# Patient Record
Sex: FEMALE | Race: WHITE | NOT HISPANIC OR LATINO | Employment: OTHER | URBAN - METROPOLITAN AREA
[De-identification: names, ages, dates, MRNs, and addresses within clinical notes are randomized per-mention and may not be internally consistent; named-entity substitution may affect disease eponyms.]

---

## 2018-12-04 LAB
LEFT EYE DM RETINOPATHY: NEGATIVE
RIGHT EYE DM RETINOPATHY: NEGATIVE

## 2018-12-28 DIAGNOSIS — M25.551 ACUTE RIGHT HIP PAIN: Primary | ICD-10-CM

## 2018-12-28 DIAGNOSIS — M25.561 ACUTE PAIN OF RIGHT KNEE: ICD-10-CM

## 2019-01-02 DIAGNOSIS — M25.561 ACUTE PAIN OF RIGHT KNEE: ICD-10-CM

## 2019-01-02 DIAGNOSIS — M25.551 ACUTE RIGHT HIP PAIN: Primary | ICD-10-CM

## 2019-01-07 ENCOUNTER — OFFICE VISIT (OUTPATIENT)
Dept: ORTHOPEDICS | Facility: CLINIC | Age: 69
End: 2019-01-07
Payer: MEDICARE

## 2019-01-07 VITALS
HEART RATE: 88 BPM | DIASTOLIC BLOOD PRESSURE: 73 MMHG | BODY MASS INDEX: 42.68 KG/M2 | HEIGHT: 64 IN | SYSTOLIC BLOOD PRESSURE: 140 MMHG | WEIGHT: 250 LBS

## 2019-01-07 DIAGNOSIS — M16.11 PRIMARY OSTEOARTHRITIS OF RIGHT HIP: Primary | ICD-10-CM

## 2019-01-07 PROCEDURE — 1101F PR PT FALLS ASSESS DOC 0-1 FALLS W/OUT INJ PAST YR: ICD-10-PCS | Mod: ,,, | Performed by: ORTHOPAEDIC SURGERY

## 2019-01-07 PROCEDURE — 99204 PR OFFICE/OUTPT VISIT, NEW, LEVL IV, 45-59 MIN: ICD-10-PCS | Mod: ,,, | Performed by: ORTHOPAEDIC SURGERY

## 2019-01-07 PROCEDURE — 1101F PT FALLS ASSESS-DOCD LE1/YR: CPT | Mod: ,,, | Performed by: ORTHOPAEDIC SURGERY

## 2019-01-07 PROCEDURE — 99204 OFFICE O/P NEW MOD 45 MIN: CPT | Mod: ,,, | Performed by: ORTHOPAEDIC SURGERY

## 2019-01-07 RX ORDER — SIMVASTATIN 20 MG/1
TABLET, FILM COATED ORAL EVERY OTHER DAY
COMMUNITY
Start: 2018-11-30 | End: 2021-03-05 | Stop reason: SDUPTHER

## 2019-01-07 RX ORDER — QUINAPRIL HYDROCHLORIDE AND HYDROCHLOROTHIAZIDE 20; 12.5 MG/1; MG/1
TABLET, FILM COATED ORAL
COMMUNITY
End: 2020-10-13

## 2019-01-07 RX ORDER — DICLOFENAC SODIUM 75 MG/1
TABLET, DELAYED RELEASE ORAL 2 TIMES DAILY
COMMUNITY
Start: 2018-11-06 | End: 2020-10-13

## 2019-01-07 RX ORDER — ATENOLOL 100 MG/1
TABLET ORAL
COMMUNITY
End: 2021-07-05

## 2019-01-07 RX ORDER — OMEGA-3-ACID ETHYL ESTERS 1 G/1
2 CAPSULE, LIQUID FILLED ORAL DAILY
COMMUNITY
End: 2021-03-05 | Stop reason: SDUPTHER

## 2019-01-07 RX ORDER — INSULIN ASPART 100 [IU]/ML
INJECTION, SUSPENSION SUBCUTANEOUS
COMMUNITY
End: 2020-12-28 | Stop reason: SDUPTHER

## 2019-01-07 RX ORDER — LIRAGLUTIDE 6 MG/ML
INJECTION SUBCUTANEOUS DAILY
COMMUNITY
Start: 2018-11-06 | End: 2021-03-15 | Stop reason: CLARIF

## 2019-01-07 RX ORDER — FENOFIBRATE 145 MG/1
TABLET, FILM COATED ORAL
COMMUNITY
End: 2021-03-05 | Stop reason: SDUPTHER

## 2019-01-07 RX ORDER — DAPAGLIFLOZIN AND METFORMIN HYDROCHLORIDE 5; 1000 MG/1; MG/1
TABLET, FILM COATED, EXTENDED RELEASE ORAL
COMMUNITY
End: 2021-03-05 | Stop reason: SDUPTHER

## 2019-01-07 NOTE — PROGRESS NOTES
[facility] ORTHOPEDICS    Subjective:     Chief Complaint:   Chief Complaint   Patient presents with    Right Hip - Pain     Right hip pain x  1 year. States that her pain does radiate to her groin and states that when she is standing up to brush her teeth she feels like her leg is going to give out.     Right Knee - Pain     Right knee pain x a while. States that it is weak and that it is hard for her to lay at night and states that she is not able to stand up.        Past Medical History:   Diagnosis Date    Arthritis     Diabetes mellitus, type 2     Hypertension        Past Surgical History:   Procedure Laterality Date    HIP SURGERY      PARTIAL HYSTERECTOMY      partical toe amputation         Current Outpatient Medications   Medication Sig    aspirin-calcium carbonate 81 mg-300 mg calcium(777 mg) Tab Take 81 mg by mouth.    atenolol (TENORMIN) 100 MG tablet atenolol 100 mg tablet    dapagliflozin-metformin (XIGDUO XR) 5-1,000 mg TBph Xigduo XR 5 mg-1,000 mg tablet,extended release   Take 1 tablet twice a day by oral route for 90 days.    diclofenac (VOLTAREN) 75 MG EC tablet     fenofibrate (TRICOR) 145 MG tablet fenofibrate nanocrystallized 145 mg tablet    insulin aspart protamine-insulin aspart (NOVOLOG MIX 70-30FLEXPEN U-100) 100 unit/mL (70-30) InPn pen Novolog Mix 70-30 FlexPen U-100 Insulin 100 unit/mL subcutaneous pen    omega-3 acid ethyl esters (LOVAZA) 1 gram capsule Take 2 g by mouth.    quinapril-hydrochlorothiazide (ACCURETIC) 20-12.5 mg per tablet quinapril 20 mg-hydrochlorothiazide 12.5 mg tablet    simvastatin (ZOCOR) 20 MG tablet     VICTOZA 3-NANCY 0.6 mg/0.1 mL (18 mg/3 mL) PnIj      No current facility-administered medications for this visit.        Review of patient's allergies indicates:   Allergen Reactions    Banana Hives    Penicillins Hives       History reviewed. No pertinent family history.    Social History     Socioeconomic History    Marital status:       Spouse name: Not on file    Number of children: Not on file    Years of education: Not on file    Highest education level: Not on file   Social Needs    Financial resource strain: Not on file    Food insecurity - worry: Not on file    Food insecurity - inability: Not on file    Transportation needs - medical: Not on file    Transportation needs - non-medical: Not on file   Occupational History    Not on file   Tobacco Use    Smoking status: Not on file   Substance and Sexual Activity    Alcohol use: Not on file    Drug use: Not on file    Sexual activity: Not on file   Other Topics Concern    Not on file   Social History Narrative    Not on file       History of present illness: This is a 68-year-old lady struggling with the right leg. She is on walker. Right leg tends to give out on her. She has pain in the right hip and also in the right knee. She's been struggling for least the last year. She has a total hip on the left      Review of Systems:    Constitution: Negative for chills, fever, and sweats.  Negative for unexplained weight loss.    HENT:  Negative for headaches and blurry vision.    Cardiovascular:Negative for chest pain or irregular heart beat. Negative for hypertension.    Respiratory:  Negative for cough and shortness of breath.    Gastrointestinal: Negative for abdominal pain, heartburn, melena, nausea, and vomitting.    Genitourinary:  Negative bladder incontinence and dysuria.    Musculoskeletal:  See HPI for details.     Neurological: Negative for numbness.    Psychiatric/Behavioral: Negative for depression.  The patient is not nervous/anxious.      Endocrine: Negative for polyuria    Hematologic/Lymphatic: Negative for bleeding problem.  Does not bruise/bleed easily.    Skin: Negative for poor would healing and rash    Objective:      Physical Examination:    Vital Signs:    Vitals:    01/07/19 1500   BP: (!) 140/73   Pulse: 88       Body mass index is 42.91 kg/m².    This a  well-developed, well nourished patient in no acute distress.  They are alert and oriented and cooperative to examination.        So physical exam shows she can very poor range of motion right hip with the flexion contracture the right hip. The right knee has some tenderness along the medial joint line but has reasonable range of motion. Left hip has good motion. She's clearly dependent on a walker. X-rays became with her shows a complete collapse of the right hip. There is loss of bone bone-on-bone shortened very impressive arthritis advanced right hip. She has a total hip on the left which appears to be holding up well. We also have some x-rays of her right knee which shows at least moderate arthritis mainly medial right knee.  Pertinent New Results:    XRAY Report / Interpretation:       Assessment/Plan:      So my impression is her biggest limiting factor is her right hip. She may need some knee surgery as well but the right hip takes precedent. She needs a total hip on the right. She is a diabetic. Elbow get clearance from her medical doctor and is scheduled for total hip. Her BMI is elevated makes it a little more complicated but she did okay with the last hip on the left and weighedeven more. So we did history and physical consent for right total hip today      This note was created using Dragon voice recognition software that occasionally misinterpreted phrases or words.

## 2019-01-08 DIAGNOSIS — M16.11 PRIMARY OSTEOARTHRITIS OF RIGHT HIP: Primary | ICD-10-CM

## 2019-01-14 ENCOUNTER — HOSPITAL ENCOUNTER (OUTPATIENT)
Dept: RADIOLOGY | Facility: HOSPITAL | Age: 69
Discharge: HOME | End: 2019-01-14
Attending: ORTHOPAEDIC SURGERY
Payer: MEDICARE

## 2019-01-14 ENCOUNTER — HOSPITAL ENCOUNTER (OUTPATIENT)
Dept: PREADMISSION TESTING | Facility: HOSPITAL | Age: 69
Discharge: HOME | End: 2019-01-14
Attending: ORTHOPAEDIC SURGERY
Payer: MEDICARE

## 2019-01-14 VITALS — WEIGHT: 250 LBS | BODY MASS INDEX: 42.68 KG/M2 | HEIGHT: 64 IN

## 2019-01-14 DIAGNOSIS — M16.11 PRIMARY OSTEOARTHRITIS OF RIGHT HIP: Primary | ICD-10-CM

## 2019-01-14 LAB
ABO + RH BLD: NORMAL
ANION GAP SERPL CALC-SCNC: 12 MMOL/L
BACTERIA #/AREA URNS HPF: ABNORMAL /HPF
BASOPHILS # BLD AUTO: 0 K/UL
BASOPHILS NFR BLD: 0.5 %
BILIRUB UR QL STRIP: NEGATIVE
BLD GP AB SCN CELLS X3 SERPL QL: NORMAL
BUN SERPL-MCNC: 30 MG/DL
CALCIUM SERPL-MCNC: 10 MG/DL
CHLORIDE SERPL-SCNC: 104 MMOL/L
CLARITY UR: CLEAR
CO2 SERPL-SCNC: 26 MMOL/L
COLOR UR: YELLOW
CREAT SERPL-MCNC: 0.8 MG/DL
DIFFERENTIAL METHOD: ABNORMAL
EOSINOPHIL # BLD AUTO: 0.1 K/UL
EOSINOPHIL NFR BLD: 1.5 %
ERYTHROCYTE [DISTWIDTH] IN BLOOD BY AUTOMATED COUNT: 14.8 %
EST. GFR  (AFRICAN AMERICAN): >60 ML/MIN/1.73 M^2
EST. GFR  (NON AFRICAN AMERICAN): >60 ML/MIN/1.73 M^2
GLUCOSE SERPL-MCNC: 88 MG/DL
GLUCOSE UR QL STRIP: ABNORMAL
HCT VFR BLD AUTO: 42.1 %
HGB BLD-MCNC: 13.6 G/DL
HGB UR QL STRIP: NEGATIVE
KETONES UR QL STRIP: ABNORMAL
LEUKOCYTE ESTERASE UR QL STRIP: NEGATIVE
LYMPHOCYTES # BLD AUTO: 1.9 K/UL
LYMPHOCYTES NFR BLD: 31.9 %
MCH RBC QN AUTO: 27.5 PG
MCHC RBC AUTO-ENTMCNC: 32.3 G/DL
MCV RBC AUTO: 85 FL
MICROSCOPIC COMMENT: ABNORMAL
MONOCYTES # BLD AUTO: 0.4 K/UL
MONOCYTES NFR BLD: 7.1 %
NEUTROPHILS # BLD AUTO: 3.5 K/UL
NEUTROPHILS NFR BLD: 59 %
NITRITE UR QL STRIP: NEGATIVE
PH UR STRIP: 5 [PH] (ref 5–8)
PLATELET # BLD AUTO: 305 K/UL
PMV BLD AUTO: 7.4 FL
POTASSIUM SERPL-SCNC: 4.1 MMOL/L
PROT UR QL STRIP: NEGATIVE
RBC # BLD AUTO: 4.95 M/UL
RBC #/AREA URNS HPF: 0 /HPF (ref 0–4)
SODIUM SERPL-SCNC: 142 MMOL/L
SP GR UR STRIP: 1.02 (ref 1–1.03)
SQUAMOUS #/AREA URNS HPF: 3 /HPF
URN SPEC COLLECT METH UR: ABNORMAL
UROBILINOGEN UR STRIP-ACNC: NEGATIVE EU/DL
WBC # BLD AUTO: 5.9 K/UL
WBC #/AREA URNS HPF: 3 /HPF (ref 0–5)
YEAST URNS QL MICRO: ABNORMAL

## 2019-01-14 PROCEDURE — 93005 ELECTROCARDIOGRAM TRACING: CPT

## 2019-01-14 PROCEDURE — 99900104 DSU ONLY-NO CHARGE-EA ADD'L HR (STAT)

## 2019-01-14 PROCEDURE — 93010 EKG 12-LEAD: ICD-10-PCS | Mod: ,,, | Performed by: INTERNAL MEDICINE

## 2019-01-14 PROCEDURE — 36415 COLL VENOUS BLD VENIPUNCTURE: CPT

## 2019-01-14 PROCEDURE — 99900103 DSU ONLY-NO CHARGE-INITIAL HR (STAT)

## 2019-01-14 PROCEDURE — 71046 X-RAY EXAM CHEST 2 VIEWS: CPT | Mod: TC,FY

## 2019-01-14 PROCEDURE — 81000 URINALYSIS NONAUTO W/SCOPE: CPT

## 2019-01-14 PROCEDURE — 86850 RBC ANTIBODY SCREEN: CPT

## 2019-01-14 PROCEDURE — 71046 X-RAY EXAM CHEST 2 VIEWS: CPT | Mod: 26,,, | Performed by: RADIOLOGY

## 2019-01-14 PROCEDURE — 93010 ELECTROCARDIOGRAM REPORT: CPT | Mod: ,,, | Performed by: INTERNAL MEDICINE

## 2019-01-14 PROCEDURE — 85025 COMPLETE CBC W/AUTO DIFF WBC: CPT

## 2019-01-14 PROCEDURE — 87081 CULTURE SCREEN ONLY: CPT

## 2019-01-14 PROCEDURE — 80048 BASIC METABOLIC PNL TOTAL CA: CPT

## 2019-01-14 PROCEDURE — 71046 XR CHEST PA AND LATERAL: ICD-10-PCS | Mod: 26,,, | Performed by: RADIOLOGY

## 2019-01-14 RX ORDER — ASPIRIN 81 MG/1
81 TABLET ORAL DAILY
Status: ON HOLD | COMMUNITY
End: 2019-01-23 | Stop reason: HOSPADM

## 2019-01-14 NOTE — DISCHARGE INSTRUCTIONS
To confirm, Your doctor has instructed you that surgery is scheduled for:     Please report to [facility], Registration the morning of surgery. You must check-in and receive a wristband before going to your procedure.    Pre-Op will call the afternoon prior to surgery between 1:00 and 6:00 PM with the final arrival time.  Phone number: [phone]    PLEASE NOTE:  The surgery schedule has many variables which may affect the time of your surgery case.  Family members should be available if your surgery time changes.  Plan to be here the day of your procedure between 4-6 hours.    MEDICATIONS:  TAKE ONLY THESE MEDICATIONS WITH A SMALL SIP OF WATER THE MORNING OF YOUR PROCEDURE: NONE      DO NOT TAKE THESE MEDICATIONS 5-7 DAYS PRIOR to your procedure or per your surgeon's request: ASPIRIN, ALEVE, ADVIL, IBUPROFEN, FISH OIL VITAMIN E, HERBALS  (May take Tylenol)  ASPIRIN, OMEGA 3, DICLOFENAC; HOLD XIGDUO NIGHT BEFORE SURGERY    ONLY if you are prescribed any types of blood thinners such as:  Aspirin, Coumadin, Plavix, Pradaxa, Xarelto, Aggrenox, Effient, Eliquis, Savasya, Brilinta, or any other, ask your surgeon whether you should stop taking them and how long before surgery you should stop.  You may also need to verify with the prescribing physician if it is ok to stop your medication.      INSTRUCTIONS IMPORTANT!!  · Do not eat or drink anything between midnight and the time of your procedure- this includes gum, mints, and candy.  · Do not smoke or drink alcoholic beverages 24 hours prior to your procedure.  · Shower the night before AND the morning of your procedure with a Chlorhexidine wash such as Hibiclens or Dial antibacterial soap from the neck down.  Do not get it on your face or in your eyes.  You may use your own shampoo and face wash. This helps your skin to be as bacteria free as possible.    · If you wear contact lenses, dentures, hearing aids or glasses, bring a container to put  them in during surgery and give to a family member for safe keeping.  Please leave all jewelry, piercing's and valuables at home.   · DO NOT remove hair from the surgery site.  Do not shave the incision site unless you are given specific instructions to do so.    · ONLY if you have been diagnosed with sleep apnea please bring your C-PAP machine.  · ONLY if you wear home oxygen please bring your portable oxygen tank the day of your procedure.  · ONLY if you have a history of OPEN HEART SURGERY you will need a clearance from your Cardiologist per Anesthesia.      · ONLY for patients requiring bowel prep, written instructions will be given by your doctor's office.  · ONLY if you have a neuro stimulator, please bring the controller with you the morning of surgery  · ONLY if a type and screen test is needed before surgery, please return:  · If your doctor has scheduled you for an overnight stay, bring a small overnight bag with any personal items you need.  · Make arrangements in advance for transportation home by a responsible adult.  It is not safe to drive a vehicle during the 24 hours after anesthesia.      · Visiting hours are 10:00AM to 8:30PM.  For the safety of all patients, visitors under the age of 12 are not allowed above the first floor of the hospital.    · All [facility] facilities and properties are tobacco free.  Smoking is NOT allowed.       If you have any questions about these instructions, call Pre-Op Admit  Nursing at [phone] or the Pre-Op Day Surgery Unit at [phone].

## 2019-01-14 NOTE — PRE ADMISSION SCREENING
JOINT CAMP ASSESSMENT    Name Marifer Bernardo   MRN [id number]    Age/Sex 68 y.o. female    Surgeon Dr. Jose Nicole Jr.   Joint Camp Date 1/14/2019   Surgery Date 1/22/2019   Procedure Right Hip Arthroplasty   Insurance Payor: [employer] MANAGED MEDICARE / Plan: [employer] MEDICARE HMO / Product Type: Capitation /    Care Team Patient Care Team:  Jose Mary MD as PCP - General (Family Medicine)  Jose Nicole Jr., MD as Consulting Physician (Orthopedic Surgery)    Pharmacy   [id number] - [place], [place] - [address] AT [facility]  [address]  [place] [address]  Phone: [phone] Fax: [phone]     AM-PAC Score   16   Risk Assessment Score 12     Past Medical History:   Diagnosis Date    Arthritis     Diabetes mellitus, type 2     Hypertension        Past Surgical History:   Procedure Laterality Date    HIP SURGERY      PARTIAL HYSTERECTOMY      partical toe amputation           Home Enviroment     Living Arrangement: Lives with spouse  Home Environment: 1-story house/ trailer, tub-shower  Home Safety Concerns: Pets in the home: cats (3).    DISCHARGE CAREGIVER/SUPPORT SYSTEM     Identified post-op caregiver: Patient has spouse / significant other.  Patient's caregiver(s) will be able to provide physical assistance. Patient will have someone to assist overnight.      Caregiver present at pre-op interview:  Yes      PRE-OPERATIVE FUNCTIONAL STATUS     Employment: Unemployed    Pre-op Functional Status: Patient is independent with mobility/ambulation, transfers, ADL's, IADL's.    Use of assistive device for ambulation: walker  ADL: self care  ADL Limitations: difficulty with walking  Medical Restrictions: Decreased range of motions in extremities    POTENTIAL BARRIERS TO DISCHARGE/POTENTIAL POST-OP COMPLICATIONS     Patient with hx of post operative nausea and vomiting, HTN, Type II diabetes.      DISCHARGE PLAN     Expected LOS of 2 days or less for joint replacement discussed  with patient.     Patient in agreement with discharge plan: Yes    Discharge to: Home with 24 hour assistance     HH:  None per Dr. Nicole     OP PT: None per Dr. Nicole     Home DME: none    Needed DME at D/C: rolling walker, bedside commode and tub transfer bench     Rx: Per Dr. Nicole at discharge     Meds to Beds: N/A  Patient expected to discharge on Aspirin 325mg by mouth twice daily for DVT prophylaxis.

## 2019-01-14 NOTE — PRE ADMISSION SCREENING
Patient Name: Marifer Bernardo  YOB: 1950   MRN: [id number]     [facility]   Basic Mobility Inpatient Short Form 6 Clicks         How much difficulty does the patient currently have  Unable  A Lot  A Little  None      1. Turning over in bed (including adjusting bedclothes, sheets and blankets)?     1 []    2 [x]    3 []    4 []        2. Sitting down on and standing up from a chair with arms (e.g., wheelchair, bedside commode, etc.)     1 []  2 [x]  3 []     4 []      3. Moving from lying on back to sitting on the side of the bed?     1 []  2 []  3 [x]    4 []    How much help from another person does the patient currently need  Total  A Lot  A Little  None      4. Moving to and from a bed to a chair (including a wheelchair)?    1 []  2 []  3 []    4 [x]      5. Need to walk in hospital room?    1 []  2 []  3 []    4 [x]      6. Climbing 3-5 steps with a railing?    1 [x]  2 []  3 []    4 []       Raw Score:      16            CMS 0-100% Score:    54.16        %   Standardized Score:     40.78          CMS Modifier:         CK                                 [facility]   Basic Mobility Inpatient Short Form 6 Clicks Score Conversion Table*         *Use this form to convert -PAC Basic Mobility Inpatient Raw Scores.   [facility] Inpatient Basic Mobility Short Form Scoring Example   1. Add the number values associated with the response to each item. For example, items totals yield a Raw Score of 21.   2. Match the raw score to the t-Scale scores (t-Scale score = 50.25, SE = 4.69).   3. Find the associated CMS % (CMS % = 28.97%).   4. Locate the correct CMS Functional Modifier Code, or G Code (G code = CJ)     NOTE: Each -PAC Short Form has a separate conversion table. Make sure that you use the correct conversion table.       Instruction Manual - page 45 contains conversion table

## 2019-01-14 NOTE — PRE ADMISSION SCREENING
"               [facility] Risk Assessment Scale    Patient Name: Marifer Benrardo  YOB: 1950  MRN: [id number]            RIsk Factor Measure Recommendation Patient Data Scale/Score   BMI >40 Reconsider surgery, weight loss   Estimated body mass index is 42.91 kg/m² as calculated from the following:    Height as of this encounter: 5' 4" (1.626 m).    Weight as of this encounter: 113.4 kg (250 lb).   [] 0 = 1 - 24.9  [] 1 = 25-29.9  [] 2 = 30-34.9  [] 3 = 35-39.9  [x] 4 = 40-44.9  [] 5 = 45-99.9   Hemoglobin AIC (if applicable) >9 Delay surgery until DM under control  Refer for:  · Nutrition Therapy  · Exercise   · Medication    No results found for: LABA1C, HGBA1C    Lab Results   Component Value Date    GLU 88 01/14/2019      [] 0 = 4.0-5.6  [] 1 = 5.7-6.4  [] 2 = 6.5-6.9  [] 3 = 7.0-7.9  [] 4 = 8.0-8.9  [] 5 = 9.0-12   Hemoglobin (Anemia) <9 Delay surgery   Correct anemia Lab Results   Component Value Date    HGB 13.6 01/14/2019    [] 20 - <9.0                    Albumin <3 Delay surgery &Workup No results found for: ALBUMIN [] 20 - <3.0   Smoking Cessation >4 Weeks Delay Surgery  Refer to OP Cessation Class    Former Smoker  Quit: 1979 [] 20 - current smoker                                _____ PPD                    Hx of MI, PE, Arrhythmia, CVA, DVT <30 Days Delay Surgery    N/A [] 20      Infection Variable Delay surgery and re-evaluate   N/A [] 20 - recent/current infection     Depression (PHQ) >10 out of 27 Delay Surgery and re-evaluate  Medication  Counseling              [x] 0     []1     []2     []3      []4      [] 5                    (1-4)      (5-9)  (10-14)  (15-19)   (20-27)     Memory Impairment & Memory loss (Mini-Cog Screening Tool) Advanced dementia and/or Parkinson's Reconsider surgery     [x] 0     []1     []2     []3     []4     [] 5     Physical Conditioning (Modified AM-PAC Per Physical Therapy at [facility]) Unable to ambulate on day of surgery Delay surgery and " re-evaluate  Pre-Rehabilitation   (PT evaluation)       []  0   []4       [x]8     []12        []16     []20       (<20%)   (<40%)   (<60%)   (<80% )    (>80%)     Home Environment/Caregiver support  (Per /Navigator Interview)    Availability of basic services and/or approprate assistance during post-operative period Delay surgery and re-evaluate  Safe home environment  Health   1 week post-surgery  Transportation  availability  Ability to obtain DME/Medications post-op    [x] 0     []1     []2     []3     []4     [] 5  [x] 0     []1     []2     []3     []4     [] 5  [x] 0     []1     []2     []3     []4     [] 5  [x] 0     []1     []2     []3     []4     [] 5         MD Contact: Dr. Nicole Comments:  Total Score:  12

## 2019-01-15 ENCOUNTER — TELEPHONE (OUTPATIENT)
Dept: ORTHOPEDICS | Facility: CLINIC | Age: 69
End: 2019-01-15

## 2019-01-15 NOTE — TELEPHONE ENCOUNTER
govind from [facility] needs clinicals h&p, progress notes, labs, radiology notes, meds list, p/t notes and any surgical notes  faxed to #[phone] attn krysta

## 2019-01-16 LAB — MRSA SPEC QL CULT: NORMAL

## 2019-01-21 ENCOUNTER — ANESTHESIA EVENT (OUTPATIENT)
Dept: SURGERY | Facility: HOSPITAL | Age: 69
DRG: 470 | End: 2019-01-21
Payer: MEDICARE

## 2019-01-22 ENCOUNTER — ANESTHESIA (OUTPATIENT)
Dept: SURGERY | Facility: HOSPITAL | Age: 69
DRG: 470 | End: 2019-01-22
Payer: MEDICARE

## 2019-01-22 ENCOUNTER — HOSPITAL ENCOUNTER (INPATIENT)
Facility: HOSPITAL | Age: 69
LOS: 1 days | Discharge: HOME | DRG: 470 | End: 2019-01-23
Attending: ORTHOPAEDIC SURGERY | Admitting: INTERNAL MEDICINE
Payer: MEDICARE

## 2019-01-22 DIAGNOSIS — M16.11 PRIMARY OSTEOARTHRITIS OF RIGHT HIP: Primary | ICD-10-CM

## 2019-01-22 DIAGNOSIS — I10 HYPERTENSION, UNSPECIFIED TYPE: ICD-10-CM

## 2019-01-22 DIAGNOSIS — Z96.641 STATUS POST TOTAL REPLACEMENT OF RIGHT HIP: ICD-10-CM

## 2019-01-22 DIAGNOSIS — E11.8 TYPE 2 DIABETES MELLITUS WITH COMPLICATION, UNSPECIFIED WHETHER LONG TERM INSULIN USE: ICD-10-CM

## 2019-01-22 DIAGNOSIS — M16.11 OSTEOARTHRITIS OF RIGHT HIP: ICD-10-CM

## 2019-01-22 PROBLEM — E11.9 DIABETES MELLITUS, TYPE 2: Status: ACTIVE | Noted: 2019-01-22

## 2019-01-22 LAB
ESTIMATED AVG GLUCOSE: 123 MG/DL
HBA1C MFR BLD HPLC: 5.9 %
POCT GLUCOSE: 112 MG/DL (ref 70–110)
POCT GLUCOSE: 185 MG/DL (ref 70–110)
POCT GLUCOSE: 186 MG/DL (ref 70–110)
POCT GLUCOSE: 193 MG/DL (ref 70–110)

## 2019-01-22 PROCEDURE — 25000003 PHARM REV CODE 250: Performed by: ANESTHESIOLOGY

## 2019-01-22 PROCEDURE — 63600175 PHARM REV CODE 636 W HCPCS: Performed by: ORTHOPAEDIC SURGERY

## 2019-01-22 PROCEDURE — 25000003 PHARM REV CODE 250: Performed by: NURSE ANESTHETIST, CERTIFIED REGISTERED

## 2019-01-22 PROCEDURE — 37000009 HC ANESTHESIA EA ADD 15 MINS: Performed by: ORTHOPAEDIC SURGERY

## 2019-01-22 PROCEDURE — 71000039 HC RECOVERY, EACH ADD'L HOUR: Performed by: ORTHOPAEDIC SURGERY

## 2019-01-22 PROCEDURE — 63600175 PHARM REV CODE 636 W HCPCS: Performed by: NURSE ANESTHETIST, CERTIFIED REGISTERED

## 2019-01-22 PROCEDURE — D9220A PRA ANESTHESIA: Mod: ANES,,, | Performed by: ANESTHESIOLOGY

## 2019-01-22 PROCEDURE — 83036 HEMOGLOBIN GLYCOSYLATED A1C: CPT

## 2019-01-22 PROCEDURE — 63600175 PHARM REV CODE 636 W HCPCS: Performed by: ANESTHESIOLOGY

## 2019-01-22 PROCEDURE — 27000221 HC OXYGEN, UP TO 24 HOURS

## 2019-01-22 PROCEDURE — 97116 GAIT TRAINING THERAPY: CPT

## 2019-01-22 PROCEDURE — G8978 MOBILITY CURRENT STATUS: HCPCS | Mod: CK

## 2019-01-22 PROCEDURE — 25000003 PHARM REV CODE 250: Performed by: ORTHOPAEDIC SURGERY

## 2019-01-22 PROCEDURE — 94799 UNLISTED PULMONARY SVC/PX: CPT

## 2019-01-22 PROCEDURE — 71000033 HC RECOVERY, INTIAL HOUR: Performed by: ORTHOPAEDIC SURGERY

## 2019-01-22 PROCEDURE — 97162 PT EVAL MOD COMPLEX 30 MIN: CPT

## 2019-01-22 PROCEDURE — D9220A PRA ANESTHESIA: ICD-10-PCS | Mod: ANES,,, | Performed by: ANESTHESIOLOGY

## 2019-01-22 PROCEDURE — 36415 COLL VENOUS BLD VENIPUNCTURE: CPT

## 2019-01-22 PROCEDURE — 99900103 DSU ONLY-NO CHARGE-INITIAL HR (STAT): Performed by: ORTHOPAEDIC SURGERY

## 2019-01-22 PROCEDURE — 82962 GLUCOSE BLOOD TEST: CPT | Performed by: ORTHOPAEDIC SURGERY

## 2019-01-22 PROCEDURE — 11000001 HC ACUTE MED/SURG PRIVATE ROOM

## 2019-01-22 PROCEDURE — D9220A PRA ANESTHESIA: Mod: CRNA,,, | Performed by: NURSE ANESTHETIST, CERTIFIED REGISTERED

## 2019-01-22 PROCEDURE — G8979 MOBILITY GOAL STATUS: HCPCS | Mod: CJ

## 2019-01-22 PROCEDURE — C1776 JOINT DEVICE (IMPLANTABLE): HCPCS | Performed by: ORTHOPAEDIC SURGERY

## 2019-01-22 PROCEDURE — 27201423 OPTIME MED/SURG SUP & DEVICES STERILE SUPPLY: Performed by: ORTHOPAEDIC SURGERY

## 2019-01-22 PROCEDURE — 36000710: Performed by: ORTHOPAEDIC SURGERY

## 2019-01-22 PROCEDURE — 37000008 HC ANESTHESIA 1ST 15 MINUTES: Performed by: ORTHOPAEDIC SURGERY

## 2019-01-22 PROCEDURE — C1713 ANCHOR/SCREW BN/BN,TIS/BN: HCPCS | Performed by: ORTHOPAEDIC SURGERY

## 2019-01-22 PROCEDURE — 97110 THERAPEUTIC EXERCISES: CPT

## 2019-01-22 PROCEDURE — 36000711: Performed by: ORTHOPAEDIC SURGERY

## 2019-01-22 PROCEDURE — 94761 N-INVAS EAR/PLS OXIMETRY MLT: CPT

## 2019-01-22 PROCEDURE — 99900104 DSU ONLY-NO CHARGE-EA ADD'L HR (STAT): Performed by: ORTHOPAEDIC SURGERY

## 2019-01-22 PROCEDURE — D9220A PRA ANESTHESIA: ICD-10-PCS | Mod: CRNA,,, | Performed by: NURSE ANESTHETIST, CERTIFIED REGISTERED

## 2019-01-22 PROCEDURE — 25000003 PHARM REV CODE 250: Performed by: INTERNAL MEDICINE

## 2019-01-22 PROCEDURE — 25000003 PHARM REV CODE 250

## 2019-01-22 DEVICE — PLUG APICAL HOLE ACE SHELL SEC: Type: IMPLANTABLE DEVICE | Site: HIP | Status: FUNCTIONAL

## 2019-01-22 DEVICE — IMPLANTABLE DEVICE: Type: IMPLANTABLE DEVICE | Site: HIP | Status: FUNCTIONAL

## 2019-01-22 DEVICE — INSERT ACE 10DEG 36MM SZ E X3: Type: IMPLANTABLE DEVICE | Site: HIP | Status: FUNCTIONAL

## 2019-01-22 RX ORDER — HYDROCODONE BITARTRATE AND ACETAMINOPHEN 10; 325 MG/1; MG/1
1 TABLET ORAL EVERY 4 HOURS PRN
Status: DISCONTINUED | OUTPATIENT
Start: 2019-01-22 | End: 2019-01-23 | Stop reason: HOSPADM

## 2019-01-22 RX ORDER — ASPIRIN 325 MG
325 TABLET ORAL 2 TIMES DAILY
Status: DISCONTINUED | OUTPATIENT
Start: 2019-01-22 | End: 2019-01-23 | Stop reason: HOSPADM

## 2019-01-22 RX ORDER — OXYCODONE HYDROCHLORIDE 10 MG/1
10 TABLET ORAL EVERY 4 HOURS PRN
Status: DISCONTINUED | OUTPATIENT
Start: 2019-01-22 | End: 2019-01-23 | Stop reason: HOSPADM

## 2019-01-22 RX ORDER — TRANEXAMIC ACID 100 MG/ML
1000 INJECTION, SOLUTION INTRAVENOUS ONCE
Status: DISCONTINUED | OUTPATIENT
Start: 2019-01-22 | End: 2019-01-22 | Stop reason: SDUPTHER

## 2019-01-22 RX ORDER — MUPIROCIN 20 MG/G
1 OINTMENT TOPICAL 2 TIMES DAILY
Status: DISCONTINUED | OUTPATIENT
Start: 2019-01-22 | End: 2019-01-23 | Stop reason: HOSPADM

## 2019-01-22 RX ORDER — MIDAZOLAM HYDROCHLORIDE 1 MG/ML
INJECTION, SOLUTION INTRAMUSCULAR; INTRAVENOUS
Status: DISCONTINUED | OUTPATIENT
Start: 2019-01-22 | End: 2019-01-22

## 2019-01-22 RX ORDER — OXYCODONE HCL 10 MG/1
10 TABLET, FILM COATED, EXTENDED RELEASE ORAL EVERY 12 HOURS
Status: DISCONTINUED | OUTPATIENT
Start: 2019-01-22 | End: 2019-01-23 | Stop reason: HOSPADM

## 2019-01-22 RX ORDER — LOPERAMIDE HYDROCHLORIDE 2 MG/1
2 CAPSULE ORAL CONTINUOUS PRN
Status: DISCONTINUED | OUTPATIENT
Start: 2019-01-22 | End: 2019-01-23 | Stop reason: HOSPADM

## 2019-01-22 RX ORDER — FAMOTIDINE 20 MG/1
20 TABLET, FILM COATED ORAL 2 TIMES DAILY
Status: CANCELLED | OUTPATIENT
Start: 2019-01-22

## 2019-01-22 RX ORDER — BISACODYL 10 MG
10 SUPPOSITORY, RECTAL RECTAL DAILY PRN
Status: DISCONTINUED | OUTPATIENT
Start: 2019-01-22 | End: 2019-01-23 | Stop reason: HOSPADM

## 2019-01-22 RX ORDER — ATENOLOL 25 MG/1
100 TABLET ORAL NIGHTLY
Status: DISCONTINUED | OUTPATIENT
Start: 2019-01-22 | End: 2019-01-23 | Stop reason: HOSPADM

## 2019-01-22 RX ORDER — PROPOFOL 10 MG/ML
VIAL (ML) INTRAVENOUS
Status: DISCONTINUED | OUTPATIENT
Start: 2019-01-22 | End: 2019-01-22

## 2019-01-22 RX ORDER — TRANEXAMIC ACID 100 MG/ML
INJECTION, SOLUTION INTRAVENOUS
Status: DISCONTINUED | OUTPATIENT
Start: 2019-01-22 | End: 2019-01-22

## 2019-01-22 RX ORDER — SODIUM CHLORIDE 0.9 % (FLUSH) 0.9 %
3 SYRINGE (ML) INJECTION
Status: DISCONTINUED | OUTPATIENT
Start: 2019-01-22 | End: 2019-01-23 | Stop reason: HOSPADM

## 2019-01-22 RX ORDER — HYDROCODONE BITARTRATE AND ACETAMINOPHEN 5; 325 MG/1; MG/1
1 TABLET ORAL EVERY 4 HOURS PRN
Status: DISCONTINUED | OUTPATIENT
Start: 2019-01-22 | End: 2019-01-23 | Stop reason: HOSPADM

## 2019-01-22 RX ORDER — LIDOCAINE HCL/PF 100 MG/5ML
SYRINGE (ML) INTRAVENOUS
Status: DISCONTINUED | OUTPATIENT
Start: 2019-01-22 | End: 2019-01-22

## 2019-01-22 RX ORDER — QUINAPRIL 20 MG/1
20 TABLET ORAL NIGHTLY
Status: DISCONTINUED | OUTPATIENT
Start: 2019-01-22 | End: 2019-01-23 | Stop reason: HOSPADM

## 2019-01-22 RX ORDER — IBUPROFEN 200 MG
16 TABLET ORAL
Status: DISCONTINUED | OUTPATIENT
Start: 2019-01-22 | End: 2019-01-23 | Stop reason: HOSPADM

## 2019-01-22 RX ORDER — ZOLPIDEM TARTRATE 5 MG/1
5 TABLET ORAL NIGHTLY PRN
Status: DISCONTINUED | OUTPATIENT
Start: 2019-01-22 | End: 2019-01-23 | Stop reason: HOSPADM

## 2019-01-22 RX ORDER — ROCURONIUM BROMIDE 10 MG/ML
INJECTION, SOLUTION INTRAVENOUS
Status: DISCONTINUED | OUTPATIENT
Start: 2019-01-22 | End: 2019-01-22

## 2019-01-22 RX ORDER — QUINAPRIL 20 MG/1
20 TABLET ORAL DAILY
Status: DISCONTINUED | OUTPATIENT
Start: 2019-01-22 | End: 2019-01-22

## 2019-01-22 RX ORDER — QUINAPRIL HYDROCHLORIDE AND HYDROCHLOROTHIAZIDE 20; 12.5 MG/1; MG/1
1 TABLET, FILM COATED ORAL DAILY
Status: DISCONTINUED | OUTPATIENT
Start: 2019-01-22 | End: 2019-01-22

## 2019-01-22 RX ORDER — FENTANYL CITRATE 50 UG/ML
INJECTION, SOLUTION INTRAMUSCULAR; INTRAVENOUS
Status: DISCONTINUED | OUTPATIENT
Start: 2019-01-22 | End: 2019-01-22

## 2019-01-22 RX ORDER — SODIUM CHLORIDE, SODIUM LACTATE, POTASSIUM CHLORIDE, CALCIUM CHLORIDE 600; 310; 30; 20 MG/100ML; MG/100ML; MG/100ML; MG/100ML
INJECTION, SOLUTION INTRAVENOUS CONTINUOUS
Status: DISCONTINUED | OUTPATIENT
Start: 2019-01-22 | End: 2019-01-22

## 2019-01-22 RX ORDER — SUCCINYLCHOLINE CHLORIDE 20 MG/ML
INJECTION INTRAMUSCULAR; INTRAVENOUS
Status: DISCONTINUED | OUTPATIENT
Start: 2019-01-22 | End: 2019-01-22

## 2019-01-22 RX ORDER — GLYCOPYRROLATE 0.2 MG/ML
INJECTION INTRAMUSCULAR; INTRAVENOUS
Status: DISCONTINUED | OUTPATIENT
Start: 2019-01-22 | End: 2019-01-22

## 2019-01-22 RX ORDER — GLUCAGON 1 MG
1 KIT INJECTION
Status: DISCONTINUED | OUTPATIENT
Start: 2019-01-22 | End: 2019-01-23 | Stop reason: HOSPADM

## 2019-01-22 RX ORDER — IBUPROFEN 200 MG
24 TABLET ORAL
Status: DISCONTINUED | OUTPATIENT
Start: 2019-01-22 | End: 2019-01-23 | Stop reason: HOSPADM

## 2019-01-22 RX ORDER — HYDROMORPHONE HYDROCHLORIDE 2 MG/ML
0.2 INJECTION, SOLUTION INTRAMUSCULAR; INTRAVENOUS; SUBCUTANEOUS EVERY 5 MIN PRN
Status: DISCONTINUED | OUTPATIENT
Start: 2019-01-22 | End: 2019-01-22 | Stop reason: HOSPADM

## 2019-01-22 RX ORDER — GLUCOSAM/CHONDRO/HERB 149/HYAL 750-100 MG
2 TABLET ORAL DAILY
Status: DISCONTINUED | OUTPATIENT
Start: 2019-01-22 | End: 2019-01-23 | Stop reason: HOSPADM

## 2019-01-22 RX ORDER — TRANEXAMIC ACID 100 MG/ML
1000 INJECTION, SOLUTION INTRAVENOUS ONCE
Status: DISCONTINUED | OUTPATIENT
Start: 2019-01-22 | End: 2019-01-22

## 2019-01-22 RX ORDER — DEXAMETHASONE SODIUM PHOSPHATE 4 MG/ML
INJECTION, SOLUTION INTRA-ARTICULAR; INTRALESIONAL; INTRAMUSCULAR; INTRAVENOUS; SOFT TISSUE
Status: DISCONTINUED | OUTPATIENT
Start: 2019-01-22 | End: 2019-01-22

## 2019-01-22 RX ORDER — INSULIN ASPART 100 [IU]/ML
0-5 INJECTION, SOLUTION INTRAVENOUS; SUBCUTANEOUS
Status: DISCONTINUED | OUTPATIENT
Start: 2019-01-22 | End: 2019-01-23 | Stop reason: HOSPADM

## 2019-01-22 RX ORDER — LIDOCAINE HYDROCHLORIDE 10 MG/ML
1 INJECTION, SOLUTION EPIDURAL; INFILTRATION; INTRACAUDAL; PERINEURAL ONCE
Status: DISCONTINUED | OUTPATIENT
Start: 2019-01-22 | End: 2019-01-22 | Stop reason: HOSPADM

## 2019-01-22 RX ORDER — HYDROCHLOROTHIAZIDE 12.5 MG/1
12.5 TABLET ORAL DAILY
Status: DISCONTINUED | OUTPATIENT
Start: 2019-01-22 | End: 2019-01-23 | Stop reason: HOSPADM

## 2019-01-22 RX ORDER — CEFAZOLIN SODIUM 2 G/50ML
2 SOLUTION INTRAVENOUS
Status: COMPLETED | OUTPATIENT
Start: 2019-01-22 | End: 2019-01-23

## 2019-01-22 RX ORDER — ACETAMINOPHEN 10 MG/ML
INJECTION, SOLUTION INTRAVENOUS
Status: DISCONTINUED | OUTPATIENT
Start: 2019-01-22 | End: 2019-01-22

## 2019-01-22 RX ORDER — VANCOMYCIN HCL IN 5 % DEXTROSE 1G/250ML
1000 PLASTIC BAG, INJECTION (ML) INTRAVENOUS
Status: CANCELLED | OUTPATIENT
Start: 2019-01-22 | End: 2019-01-22

## 2019-01-22 RX ORDER — SIMVASTATIN 10 MG/1
20 TABLET, FILM COATED ORAL EVERY OTHER DAY
Status: DISCONTINUED | OUTPATIENT
Start: 2019-01-23 | End: 2019-01-23 | Stop reason: HOSPADM

## 2019-01-22 RX ORDER — ATENOLOL 25 MG/1
100 TABLET ORAL DAILY
Status: DISCONTINUED | OUTPATIENT
Start: 2019-01-22 | End: 2019-01-22

## 2019-01-22 RX ORDER — FENOFIBRATE 145 MG/1
145 TABLET, FILM COATED ORAL DAILY
Status: DISCONTINUED | OUTPATIENT
Start: 2019-01-22 | End: 2019-01-23 | Stop reason: HOSPADM

## 2019-01-22 RX ORDER — CELECOXIB 100 MG/1
200 CAPSULE ORAL 2 TIMES DAILY
Status: DISCONTINUED | OUTPATIENT
Start: 2019-01-22 | End: 2019-01-23 | Stop reason: HOSPADM

## 2019-01-22 RX ORDER — METOCLOPRAMIDE HYDROCHLORIDE 5 MG/ML
10 INJECTION INTRAMUSCULAR; INTRAVENOUS EVERY 10 MIN PRN
Status: COMPLETED | OUTPATIENT
Start: 2019-01-22 | End: 2019-01-22

## 2019-01-22 RX ORDER — ONDANSETRON 2 MG/ML
4 INJECTION INTRAMUSCULAR; INTRAVENOUS EVERY 12 HOURS PRN
Status: DISCONTINUED | OUTPATIENT
Start: 2019-01-22 | End: 2019-01-23 | Stop reason: HOSPADM

## 2019-01-22 RX ORDER — VANCOMYCIN HCL IN 5 % DEXTROSE 1G/250ML
1000 PLASTIC BAG, INJECTION (ML) INTRAVENOUS
Status: COMPLETED | OUTPATIENT
Start: 2019-01-22 | End: 2019-01-22

## 2019-01-22 RX ORDER — SODIUM CHLORIDE 0.9 % (FLUSH) 0.9 %
5 SYRINGE (ML) INJECTION
Status: DISCONTINUED | OUTPATIENT
Start: 2019-01-22 | End: 2019-01-23 | Stop reason: HOSPADM

## 2019-01-22 RX ORDER — DEXTROSE MONOHYDRATE AND SODIUM CHLORIDE 5; .45 G/100ML; G/100ML
INJECTION, SOLUTION INTRAVENOUS CONTINUOUS
Status: CANCELLED | OUTPATIENT
Start: 2019-01-22

## 2019-01-22 RX ORDER — KETAMINE HYDROCHLORIDE 100 MG/ML
INJECTION, SOLUTION INTRAMUSCULAR; INTRAVENOUS
Status: DISCONTINUED | OUTPATIENT
Start: 2019-01-22 | End: 2019-01-22

## 2019-01-22 RX ORDER — SODIUM CHLORIDE 9 MG/ML
INJECTION, SOLUTION INTRAVENOUS CONTINUOUS
Status: DISCONTINUED | OUTPATIENT
Start: 2019-01-22 | End: 2019-01-23 | Stop reason: HOSPADM

## 2019-01-22 RX ORDER — CEFAZOLIN SODIUM 1 G/3ML
INJECTION, POWDER, FOR SOLUTION INTRAMUSCULAR; INTRAVENOUS
Status: DISCONTINUED | OUTPATIENT
Start: 2019-01-22 | End: 2019-01-22

## 2019-01-22 RX ORDER — ONDANSETRON HYDROCHLORIDE 2 MG/ML
INJECTION, SOLUTION INTRAMUSCULAR; INTRAVENOUS
Status: DISCONTINUED | OUTPATIENT
Start: 2019-01-22 | End: 2019-01-22

## 2019-01-22 RX ADMIN — PROMETHAZINE HYDROCHLORIDE 6.25 MG: 25 INJECTION INTRAMUSCULAR; INTRAVENOUS at 11:01

## 2019-01-22 RX ADMIN — HYDROMORPHONE HYDROCHLORIDE 0.2 MG: 2 INJECTION, SOLUTION INTRAMUSCULAR; INTRAVENOUS; SUBCUTANEOUS at 11:01

## 2019-01-22 RX ADMIN — LIDOCAINE HYDROCHLORIDE 100 MG: 20 INJECTION, SOLUTION INTRAVENOUS at 07:01

## 2019-01-22 RX ADMIN — QUINAPRIL 20 MG: 20 TABLET ORAL at 08:01

## 2019-01-22 RX ADMIN — GLYCOPYRROLATE 0.2 MG: 0.2 INJECTION, SOLUTION INTRAMUSCULAR; INTRAVENOUS at 08:01

## 2019-01-22 RX ADMIN — FENTANYL CITRATE 50 MCG: 50 INJECTION, SOLUTION INTRAMUSCULAR; INTRAVENOUS at 08:01

## 2019-01-22 RX ADMIN — FENTANYL CITRATE 50 MCG: 50 INJECTION, SOLUTION INTRAMUSCULAR; INTRAVENOUS at 07:01

## 2019-01-22 RX ADMIN — MUPIROCIN 1 G: 20 OINTMENT TOPICAL at 09:01

## 2019-01-22 RX ADMIN — FENOFIBRATE 145 MG: 145 TABLET, FILM COATED ORAL at 02:01

## 2019-01-22 RX ADMIN — TRANEXAMIC ACID 1000 MG: 100 INJECTION, SOLUTION INTRAVENOUS at 02:01

## 2019-01-22 RX ADMIN — ATENOLOL 100 MG: 25 TABLET ORAL at 09:01

## 2019-01-22 RX ADMIN — SUCCINYLCHOLINE CHLORIDE 160 MG: 20 INJECTION, SOLUTION INTRAMUSCULAR; INTRAVENOUS at 08:01

## 2019-01-22 RX ADMIN — CEFAZOLIN 2 G: 1 INJECTION, POWDER, FOR SOLUTION INTRAVENOUS at 08:01

## 2019-01-22 RX ADMIN — SODIUM CHLORIDE, SODIUM LACTATE, POTASSIUM CHLORIDE, AND CALCIUM CHLORIDE: .6; .31; .03; .02 INJECTION, SOLUTION INTRAVENOUS at 06:01

## 2019-01-22 RX ADMIN — ROCURONIUM BROMIDE 5 MG: 10 INJECTION, SOLUTION INTRAVENOUS at 07:01

## 2019-01-22 RX ADMIN — HYDROCHLOROTHIAZIDE 12.5 MG: 12.5 CAPSULE ORAL at 02:01

## 2019-01-22 RX ADMIN — METOCLOPRAMIDE 10 MG: 5 INJECTION, SOLUTION INTRAMUSCULAR; INTRAVENOUS at 11:01

## 2019-01-22 RX ADMIN — VANCOMYCIN HYDROCHLORIDE 1000 MG: 1 INJECTION, POWDER, FOR SOLUTION INTRAVENOUS at 07:01

## 2019-01-22 RX ADMIN — ACETAMINOPHEN 1000 MG: 10 INJECTION, SOLUTION INTRAVENOUS at 08:01

## 2019-01-22 RX ADMIN — TRANEXAMIC ACID 1000 MG: 100 INJECTION, SOLUTION INTRAVENOUS at 08:01

## 2019-01-22 RX ADMIN — MUPIROCIN 1 G: 20 OINTMENT TOPICAL at 02:01

## 2019-01-22 RX ADMIN — DEXAMETHASONE SODIUM PHOSPHATE 8 MG: 4 INJECTION, SOLUTION INTRAMUSCULAR; INTRAVENOUS at 08:01

## 2019-01-22 RX ADMIN — ASPIRIN 325 MG ORAL TABLET 325 MG: 325 PILL ORAL at 02:01

## 2019-01-22 RX ADMIN — SODIUM CHLORIDE, SODIUM LACTATE, POTASSIUM CHLORIDE, AND CALCIUM CHLORIDE: .6; .31; .03; .02 INJECTION, SOLUTION INTRAVENOUS at 08:01

## 2019-01-22 RX ADMIN — OXYCODONE HYDROCHLORIDE 10 MG: 10 TABLET, FILM COATED, EXTENDED RELEASE ORAL at 08:01

## 2019-01-22 RX ADMIN — ONDANSETRON 4 MG: 2 INJECTION INTRAMUSCULAR; INTRAVENOUS at 09:01

## 2019-01-22 RX ADMIN — SODIUM CHLORIDE, SODIUM LACTATE, POTASSIUM CHLORIDE, AND CALCIUM CHLORIDE: .6; .31; .03; .02 INJECTION, SOLUTION INTRAVENOUS at 09:01

## 2019-01-22 RX ADMIN — ONDANSETRON 4 MG: 2 INJECTION, SOLUTION INTRAMUSCULAR; INTRAVENOUS at 08:01

## 2019-01-22 RX ADMIN — VANCOMYCIN HYDROCHLORIDE 1500 MG: 1 INJECTION, POWDER, FOR SOLUTION INTRAVENOUS at 07:01

## 2019-01-22 RX ADMIN — MIDAZOLAM 2 MG: 1 INJECTION INTRAMUSCULAR; INTRAVENOUS at 07:01

## 2019-01-22 RX ADMIN — PROPOFOL 150 MG: 10 INJECTION, EMULSION INTRAVENOUS at 07:01

## 2019-01-22 RX ADMIN — CEFAZOLIN SODIUM 2 G: 2 SOLUTION INTRAVENOUS at 05:01

## 2019-01-22 RX ADMIN — KETAMINE HYDROCHLORIDE 20 MG: 100 INJECTION, SOLUTION, CONCENTRATE INTRAMUSCULAR; INTRAVENOUS at 08:01

## 2019-01-22 RX ADMIN — OXYCODONE HYDROCHLORIDE 10 MG: 10 TABLET, FILM COATED, EXTENDED RELEASE ORAL at 02:01

## 2019-01-22 RX ADMIN — CELECOXIB 200 MG: 100 CAPSULE ORAL at 09:01

## 2019-01-22 RX ADMIN — ASPIRIN 325 MG ORAL TABLET 325 MG: 325 PILL ORAL at 08:01

## 2019-01-22 RX ADMIN — SODIUM CHLORIDE: 0.9 INJECTION, SOLUTION INTRAVENOUS at 11:01

## 2019-01-22 RX ADMIN — CELECOXIB 200 MG: 100 CAPSULE ORAL at 02:01

## 2019-01-22 RX ADMIN — OMEGA-3 FATTY ACIDS CAP 1000 MG 2 CAPSULE: 1000 CAP at 02:01

## 2019-01-22 NOTE — TRANSFER OF CARE
"Anesthesia Transfer of Care Note    Patient: Marifer Bernardo    Procedure(s) Performed: Procedure(s) (LRB):  ARTHROPLASTY, HIP (Right)    Patient location: PACU    Anesthesia Type: general    Transport from OR: Transported from OR on 2-3 L/min O2 by NC with adequate spontaneous ventilation    Post pain: adequate analgesia    Post assessment: no apparent anesthetic complications and tolerated procedure well    Post vital signs: stable    Level of consciousness: sedated and responds to stimulation    Nausea/Vomiting: no nausea/vomiting    Complications: none    Transfer of care protocol was followed      Last vitals:   Visit Vitals  BP (!) 141/62 (BP Location: Left arm, Patient Position: Sitting)   Pulse 76   Temp 36.3 °C (97.3 °F) (Skin)   Resp 20   Ht 5' 4" (1.626 m)   Wt 113.4 kg (250 lb)   SpO2 95%   Breastfeeding? No   BMI 42.91 kg/m²     "

## 2019-01-22 NOTE — BRIEF OP NOTE
[facility]  Brief Operative Note    SUMMARY     Surgery Date: 1/22/2019     Surgeon(s) and Role:     * Jose Nicole Jr., MD - Primary    Assisting Surgeon: None    Pre-op Diagnosis:  Primary osteoarthritis of right hip [M16.11]    Post-op Diagnosis:  Post-Op Diagnosis Codes:     * Primary osteoarthritis of right hip [M16.11]    Procedure(s) (LRB):  ARTHROPLASTY, HIP (Right)    Anesthesia: General    Description of the findings of the procedure: routine dalton     Estimated Blood Loss: 500 mL         Specimens:   Specimen (12h ago, onward)    None

## 2019-01-22 NOTE — PLAN OF CARE
Problem: Physical Therapy Goal  Goal: Physical Therapy Goal  Goals to be met by: 2019     Patient will increase functional independence with mobility by performin. Supine to sit with MInimal Assistance  2. Sit to stand transfer with Minimal Assistance  3. Bed to chair transfer with Minimal Assistance using Rolling Walker  4. Gait  x 250 feet with Minimal Assistance using Rolling Walker.   5. Lower extremity exercise program x20 reps per handout, with assistance as needed    Outcome: Ongoing (interventions implemented as appropriate)  PT eval and treat completed. Gait 40ft with Rw min assist. Pt completed QS,GS, assisted heel slides, AP. Pt reviewed on FABIANA precautions

## 2019-01-22 NOTE — PT/OT/SLP EVAL
Physical Therapy Evaluation    Patient Name:  Marifer Bernardo   MRN:  [id number]    Recommendations:     Discharge Recommendations:  (HHPT)   Discharge Equipment Recommendations: none   Barriers to discharge: None    Assessment:     Marifer Bernardo is a 68 y.o. female admitted with a medical diagnosis of Status post total replacement of right hip.  She presents with the following impairments/functional limitations:  weakness, impaired endurance, impaired functional mobilty, gait instability, impaired self care skills, impaired balance, decreased lower extremity function, pain, orthopedic precautions . Pt alert, interactive and able to ambulate with RW  40ft min assist WBAT. Pt aware of FABIANA precautions as she had her LH done in 2002. Pt should progress well with PT    Rehab Prognosis: Good; patient would benefit from acute skilled PT services to address these deficits and reach maximum level of function.    Recent Surgery: Procedure(s) (LRB):  ARTHROPLASTY, HIP (Right) Day of Surgery    Plan:     During this hospitalization, patient to be seen BID to address the identified rehab impairments via gait training, therapeutic activities, therapeutic exercises and progress toward the following goals:    · Plan of Care Expires:  02/01/19    Subjective     Chief Complaint: a little sore  Patient/Family Comments/goals: get well and go back on cruise  Pain/Comfort:  · Pain Rating 1: 4/10  · Location - Side 1: Right  · Location 1: hip  · Pain Addressed 1: Reposition, Distraction    Patients cultural, spiritual, [employer] conflicts given the current situation:      Living Environment:  Home with spouse with 3 cats  Prior to admission, patients level of function was ambulatory with RW.  Equipment used at home: walker, rolling.  DME owned (not currently used): .  Upon discharge, patient will have assistance from family.    Objective:     Communicated with nurse Wray prior to session.  Patient found all lines intact and call button  in reach alicia catheter, SARA drain, SCD, oxygen, peripheral IV(pillow in between LE's)  upon PT entry to room.    General Precautions: Standard, fall   Orthopedic Precautions:RLE weight bearing as tolerated, RLE posterior precautions   Braces: N/A     Exams:  · RLE ROM: Deficits: within hip precautions  · RLE Strength: Deficits: 3-/5  · LLE ROM: WFL  · LLE Strength: WFL    Functional Mobility:  · Bed Mobility:     · Scooting: moderate assistance  · Supine to Sit: moderate assistance  · Sit to Supine: moderate assistance  · Transfers:     · Sit to Stand:  moderate assistance with rolling walker  · Gait: 40ft with Rw min assist WBAT RLE      Therapeutic Activities and Exercises:   pt completed AP,QS,GS, gentle heel slides  Pt reviewed on FABIANA precautions  Back supine post PT with SARA drain/alicia intact  Pillow in between LE's    AM-PAC 6 CLICK MOBILITY  Total Score:16     Patient left HOB elevated with all lines intact, call button in reach and nurse Kamala notified.    GOALS:   Multidisciplinary Problems     Physical Therapy Goals        Problem: Physical Therapy Goal    Goal Priority Disciplines Outcome Goal Variances Interventions   Physical Therapy Goal     PT, PT/OT Ongoing (interventions implemented as appropriate)     Description:  Goals to be met by: 2019     Patient will increase functional independence with mobility by performin. Supine to sit with MInimal Assistance  2. Sit to stand transfer with Minimal Assistance  3. Bed to chair transfer with Minimal Assistance using Rolling Walker  4. Gait  x 250 feet with Minimal Assistance using Rolling Walker.   5. Lower extremity exercise program x20 reps per handout, with assistance as needed                      History:     Past Medical History:   Diagnosis Date    Arthritis     Diabetes mellitus, type 2     Encounter for blood transfusion     Hypertension     PONV (postoperative nausea and vomiting)     Wears glasses        Past Surgical History:    Procedure Laterality Date    HIP SURGERY      HYSTERECTOMY      JOINT REPLACEMENT Left     HIP    PARTIAL HYSTERECTOMY      partical toe amputation         Clinical Decision Making:     History  Co-morbidities and personal factors that may impact the plan of care Examination  Body Structures and Functions, activity limitations and participation restrictions that may impact the plan of care Clinical Presentation   Decision Making/ Complexity Score   Co-morbidities:   [] Time since onset of injury / illness / exacerbation  [] Status of current condition  []Patient's cognitive status and safety concerns    [] Multiple Medical Problems (see med hx)  Personal Factors:   [] Patient's age  [] Prior Level of function   [] Patient's home situation (environment and family support)  [] Patient's level of motivation  [] Expected progression of patient      HISTORY:(criteria)    [] 94359 - no personal factors/history    [] 45621 - has 1-2 personal factor/comorbidity     [] 62443 - has >3 personal factor/comorbidity     Body Regions:  [] Objective examination findings  [] Head     []  Neck  [] Trunk   [] Upper Extremity  [] Lower Extremity    Body Systems:  [] For communication ability, affect, cognition, language, and learning style: the assessment of the ability to make needs known, consciousness, orientation (person, place, and time), expected emotional /behavioral responses, and learning preferences (eg, learning barriers, education  needs)  [] For the neuromuscular system: a general assessment of gross coordinated movement (eg, balance, gait, locomotion, transfers, and transitions) and motor function  (motor control and motor learning)  [] For the musculoskeletal system: the assessment of gross symmetry, gross range of motion, gross strength, height, and weight  [] For the integumentary system: the assessment of pliability(texture), presence of scar formation, skin color, and skin integrity  [] For  cardiovascular/pulmonary system: the assessment of heart rate, respiratory rate, blood pressure, and edema     Activity limitations:    [] Patient's cognitive status and saf ety concerns          [] Status of current condition      [] Weight bearing restriction  [] Cardiopulmunary Restriction    Participation Restrictions:   [] Goals and goal agreement with the patient     [] Rehab potential (prognosis) and probable outcome      Examination of Body System: (criteria)    [] 17719 - addressing 1-2 elements    [] 86913 - addressing a total of 3 or more elements     [] 99141 -  Addressing a total of 4 or more elements         Clinical Presentation: (criteria)  Choose one     On examination of body system using standardized tests and measures patient presents with (CHOOSE ONE) elements from any of the following: body structures and functions, activity limitations, and/or participation restrictions.  Leading to a clinical presentation that is considered (CHOOSE ONE)                              Clinical Decision Making  (Eval Complexity):  Choose One     Time Tracking:     PT Received On: 01/22/19  PT Start Time: 1543     PT Stop Time: 1614  PT Total Time (min): 31 min     Billable Minutes: Evaluation 10, Gait Training 11 and Therapeutic Exercise 10      Madiha Monsalve, PT  01/22/2019

## 2019-01-22 NOTE — H&P
PCP: Jose Mary MD    History & Physical    Chief Complaint: s/p right total hip arthroplasty    History of Present Illness:  Patient is a 68 y.o. female admitted to Hospitalist Service from Operation Room s/p right total hip  arthroplasty performed by Dr. Nicole. Patient reportedly has past medical history significant for OA, hypertension and DM-2. Post-operatively, patient denied chest pain, shortness of breath, abdominal pain, nausea, vomiting, headache, vision changes, focal neuro-deficits, cough or fever.    Past Medical History:   Diagnosis Date    Arthritis     Diabetes mellitus, type 2     Encounter for blood transfusion     Hypertension     PONV (postoperative nausea and vomiting)     Wears glasses      Past Surgical History:   Procedure Laterality Date    HIP SURGERY      HYSTERECTOMY      JOINT REPLACEMENT Left     HIP    PARTIAL HYSTERECTOMY      partical toe amputation       History reviewed. No pertinent family history.  Social History     Tobacco Use    Smoking status: Former Smoker     Last attempt to quit: 1979     Years since quittin.0    Smokeless tobacco: Never Used   Substance Use Topics    Alcohol use: No     Frequency: Never    Drug use: No      Review of patient's allergies indicates:   Allergen Reactions    Banana Hives    Penicillins Hives     PTA Medications   Medication Sig    atenolol (TENORMIN) 100 MG tablet atenolol 100 mg tablet    dapagliflozin-metformin (XIGDUO XR) 5-1,000 mg TBph Xigduo XR 5 mg-1,000 mg tablet,extended release   Take 1 tablet twice a day by oral route for 90 days.    fenofibrate (TRICOR) 145 MG tablet fenofibrate nanocrystallized 145 mg tablet    insulin aspart protamine-insulin aspart (NOVOLOG MIX 70-30FLEXPEN U-100) 100 unit/mL (70-30) InPn pen Novolog Mix 70-30 FlexPen U-100 Insulin 100 unit/mL subcutaneous pen    omega-3 acid ethyl esters (LOVAZA) 1 gram capsule Take 2 g by mouth once daily.     quinapril-hydrochlorothiazide  (ACCURETIC) 20-12.5 mg per tablet quinapril 20 mg-hydrochlorothiazide 12.5 mg tablet    simvastatin (ZOCOR) 20 MG tablet every other day. HS    VICTOZA 3-NANCY 0.6 mg/0.1 mL (18 mg/3 mL) PnIj 2 (two) times daily. 1.8 MG AM AND 1.2 MG HS    aspirin (ECOTRIN) 81 MG EC tablet Take 81 mg by mouth once daily.    diclofenac (VOLTAREN) 75 MG EC tablet 2 (two) times daily.      Review of Systems:  Constitutional: no fever or chills  Eyes: no visual changes  Ears, nose, mouth, throat, and face: no nasal congestion or sore throat  Respiratory: no cough or shorness of breath  Cardiovascular: no chest pain or palpitations  Gastrointestinal: no nausea or vomiting, no abdominal pain or change in bowel habits  Genitourinary: no hematuria or dysuria  Integument/breast: no rash or pruritis  Hematologic/lymphatic: no easy bruising or lymphadenopathy  Musculoskeletal: see HPI  Neurological: no seizures or tremors.  Behavioral/Psych: no auditory or visual hallucinations  Endocrine: no heat or cold intolerance     OBJECTIVE:     Vital Signs (Most Recent)  Temp: 97.3 °F (36.3 °C) (01/22/19 0624)  Pulse: 76 (01/22/19 0624)  Resp: 20 (01/22/19 0624)  BP: (!) 141/62 (01/22/19 0624)  SpO2: 95 % (01/22/19 0624)    Physical Exam:  General appearance: well developed, appears stated age  Head: normocephalic, atraumatic  Eyes:  conjunctivae/corneas clear. PERRL.  Nose: Nares normal. Septum midline.  Throat: lips, mucosa, and tongue normal; teeth and gums normal, no throat erythema.  Neck: supple, symmetrical, trachea midline, no JVD and thyroid not enlarged, symmetric, no tenderness/mass/nodules  Lungs:  clear to auscultation bilaterally and normal respiratory effort  Chest wall: no tenderness  Heart: regular rate and rhythm, S1, S2 normal, no murmur, click, rub or gallop  Abdomen: soft, non-tender non-distented; bowel sounds normal; no masses,  no organomegaly  Extremities: no cyanosis, clubbing or edema. Right hip dressing C/D/I.  Pulses: 2+  and symmetric  Skin: Skin color, texture, turgor normal. No rashes or lesions.  Lymph nodes: Cervical, supraclavicular, and axillary nodes normal.  Neurologic: Normal strength and tone. No focal numbness or weakness. CNII-XII intact.      Laboratory:   CBC: No results for input(s): WBC, RBC, HGB, HCT, PLT, MCV, MCH, MCHC in the last 168 hours.  CMP: No results for input(s): GLU, CALCIUM, ALBUMIN, PROT, NA, K, CO2, CL, BUN, CREATININE, ALKPHOS, ALT, AST, BILITOT in the last 168 hours.    No results found for: HGBA1C    Diagnostic Results: None    Assessment/Plan:     Active Hospital Problems    Diagnosis  POA    *Status post total replacement of right hip [Z96.641]  Not Applicable    Osteoarthritis of right hip [M16.11]  Continue to follow Orthopedic recommendations.  Needs aggressive incentive spirometry.  Follow hemoglobin and hematocrit closely.  Pain control with IV narcotics and antiemetics as needed.  Physical therapy as per Orthopedics protocol with fall precautions.    Yes    Diabetes mellitus, type 2 [E11.9]  Check blood glucose level q AC/HS.  Use Novolog Insulin Sliding Scale as needed.   Continue American Diabetic Association 1800 Kcal diet.  Hold oral hypoglycemics for now.    Yes    Hypertension [I10]  Chronic problem. Will continue chronic medications and monitor for any changes, adjusting as needed.  Yes        DVT prophylaxis: use SCD and TEDs. On  mg PO BID as per Dr. Nicole.    Yeison Sandy MD  Department of Hospital Medicine   [facility]

## 2019-01-22 NOTE — OP NOTE
DATE OF PROCEDURE:  01/22/2019.    PROCEDURE:  Total hip arthroplasty, right.    PREOPERATIVE DIAGNOSIS:  Osteoarthritis, right hip.    POSTOPERATIVE DIAGNOSIS:  Osteoarthritis, right hip.    ANESTHESIA:  General.    SURGEON:  Jose Nicole M.D.    ASSISTANT:  Karen Vernon.    PROCEDURE IN DETAIL:  The patient was taken to the Operating Room, placed under   general anesthesia, turned in the lateral position on the beanbag.  We   positioned her adequately.  We then prepped and draped right hip for a   posterolateral incision, then made a posterolateral incision and went through   skin, subcutaneous, split the corner of the IT band and gluteus dari,   carefully placed our retractors, identified the trochanter.  We then removed the   external rotators and capsule and T'd the capsule off the back of the   trochanter.  This allowed us access to the femoral neck, put our retractors in,   we then osteotomized femoral neck and removed the femoral head.  Once we had   better visualization, we mobilized some of the anterior capsule.  We then got a   good look at the femur, made our more definitive cut on the femoral neck.  We   then began our reaming on the femur, used our cookie cutter and straight reamers   and initially we went to a straight 9 and then broached 9 thinking maybe we   will get one more size in.  This is [place] straight stem.  We left the broach   in.  We then went to our acetabulum elevator, soft tissues to look at the   acetabulum.  I then removed the labrum off the acetabulum and then began our   reaming.  Our goal was to ream a little bit medial since she had a relatively   shallow acetabulum, and the other goal is to try to match the other hip, which   was a total hip, which was relatively long, so we began our reaming, reamed up   to size 52 that looked like a good final 54, looked the final correct sizes for   her.  We put in a 54 trial.  We then put a 54 press-fit cup, put a single screw   in, all  that went well.  I was happy looking at the version.  We then reduced   the hip.  We used our trials and a +0 neck lengths with the 127, so as to match   the other side.  We got an AP x-ray, looked as though the femur we might be able   to little more size in.  The length was closed, but although we could go a   little bit longer to try to match the other side and the cup looked pretty good,   maybe a little under covered superior maybe, but good version.  Overall, was   happy with my x-ray, removed all the trials.  Went back irrigated.  We then put   our hole eliminator, then we put in our poly and we did put eccentric liner   leaving a little more posterior and superior, 36 X3 poly.  Then, we went back to   our femur, took the broach out and ____ get more more size in, so we straight   reamed to 10, put our 10 broach in and then irrigated, put our Pressfit 10 stem   in, and the stem was a little bit proud by 1 mm x 2 mm.  We then trialed and I   could get a +2.5 neck, which seemed again very stable in full extension.  I   thought this was going to be closer to matching the opposite side, so we had   127+2.5.  We put on a ceramic head 36 on dry trunnion, reduced everything,   seemed to be very stable at the end.  We then irrigated, closed our capsule with   #1 Vicryl, replaced our piriformis through a drill hole in the trochanter, then   closed our gluteus dari with #1 Vicryl, closed the corner of the IT band   with #2 Tycron and we palpated the sciatic nerve on the way out looked to be   uninjured, left one 3/16th Kristian-Winkler drain in, brought through a separate   stab wound, closed subQ with 2-0 Vicryl, closed skin with running subcuticular   Monocryl and Steri-Strips and then applied a dressing.  She was then reversed   from anesthesia.  Blood loss was about 500 mL, was bleeding a little on the way   in, but pretty dry as we finished and one drain was left.      LAVERNE/DUGLAS  dd: 01/22/2019 10:14:12 (CST)  td:  01/22/2019 11:02:09 (MAKAYLA)  Doc ID   #[id number]  Job ID #[id number]    CC:

## 2019-01-22 NOTE — ANESTHESIA POSTPROCEDURE EVALUATION
"Anesthesia Post Evaluation    Patient: Marifer Bernardo    Procedure(s) Performed: Procedure(s) (LRB):  ARTHROPLASTY, HIP (Right)    Final Anesthesia Type: general  Patient location during evaluation: PACU  Patient participation: Yes- Able to Participate  Level of consciousness: awake and alert  Post-procedure vital signs: reviewed and stable  Pain management: adequate  Airway patency: patent  PONV status at discharge: No PONV  Anesthetic complications: no      Cardiovascular status: blood pressure returned to baseline  Respiratory status: unassisted  Hydration status: euvolemic  Follow-up not needed.        Visit Vitals  /63 (BP Location: Left arm, Patient Position: Lying)   Pulse 77   Temp 36.1 °C (96.9 °F) (Axillary)   Resp 16   Ht 5' 4" (1.626 m)   Wt 113.4 kg (250 lb)   SpO2 98%   Breastfeeding? No   BMI 42.91 kg/m²       Pain/Rohith Score: Pain Rating Prior to Med Admin: 7 (1/22/2019  2:07 PM)  Rohith Score: 8 (1/22/2019 11:55 AM)        "

## 2019-01-22 NOTE — PLAN OF CARE
Problem: Adult Inpatient Plan of Care  Goal: Plan of Care Review  Outcome: Ongoing (interventions implemented as appropriate)  Nc 2 lpm in use with IS at bedside to use Q1 hrs

## 2019-01-22 NOTE — ANESTHESIA PREPROCEDURE EVALUATION
01/22/2019  Marifer Bernardo is a 68 y.o., female.    Anesthesia Evaluation    I have reviewed the Patient Summary Reports.    I have reviewed the Nursing Notes.   I have reviewed the Medications.     Review of Systems  Anesthesia Hx:  Denies Family Hx of Anesthesia complications.  Personal Hx of Anesthesia complications, Post-Operative Nausea/Vomiting, in the past, but not with recent anesthetics / prophylaxis   Cardiovascular:   Hypertension    Musculoskeletal:   Arthritis     Endocrine:   Diabetes        Physical Exam  General:  Morbid Obesity, Malnutrition    Airway/Jaw/Neck:  Airway Findings: Mouth Opening: Normal Tongue: Normal  General Airway Assessment: Adult  Mallampati: III  TM Distance: 4 - 6 cm  Jaw/Neck Findings:  Neck ROM: Extension Decreased, Mild  Neck Findings:  Girth Increased      Dental:  Dental Findings: Periodontal disease, Mild   Chest/Lungs:  Chest/Lungs Findings: Clear to auscultation, Normal Respiratory Rate     Heart/Vascular:  Heart Findings: Rate: Normal  Rhythm: Regular Rhythm  Sounds: Normal             Anesthesia Plan  Type of Anesthesia, risks & benefits discussed:  Anesthesia Type:  general  Patient's Preference:   Intra-op Monitoring Plan: standard ASA monitors  Intra-op Monitoring Plan Comments:   Post Op Pain Control Plan:   Post Op Pain Control Plan Comments:   Induction:   IV  Beta Blocker:  Patient is on a Beta-Blocker and has received one dose within the past 24 hours (No further documentation required).       Informed Consent: Patient understands risks and agrees with Anesthesia plan.  Questions answered. Anesthesia consent signed with patient.  ASA Score: 3     Day of Surgery Review of History & Physical:    H&P update referred to the surgeon.         Ready For Surgery From Anesthesia Perspective.

## 2019-01-23 VITALS
SYSTOLIC BLOOD PRESSURE: 125 MMHG | WEIGHT: 250 LBS | OXYGEN SATURATION: 96 % | HEIGHT: 64 IN | TEMPERATURE: 98 F | BODY MASS INDEX: 42.68 KG/M2 | HEART RATE: 85 BPM | RESPIRATION RATE: 18 BRPM | DIASTOLIC BLOOD PRESSURE: 57 MMHG

## 2019-01-23 LAB
ANION GAP SERPL CALC-SCNC: 11 MMOL/L
BASOPHILS # BLD AUTO: 0 K/UL
BASOPHILS NFR BLD: 0.2 %
BUN SERPL-MCNC: 13 MG/DL
CALCIUM SERPL-MCNC: 8.8 MG/DL
CHLORIDE SERPL-SCNC: 106 MMOL/L
CO2 SERPL-SCNC: 25 MMOL/L
CREAT SERPL-MCNC: 0.7 MG/DL
DIFFERENTIAL METHOD: ABNORMAL
EOSINOPHIL # BLD AUTO: 0 K/UL
EOSINOPHIL NFR BLD: 0.4 %
ERYTHROCYTE [DISTWIDTH] IN BLOOD BY AUTOMATED COUNT: 15.1 %
EST. GFR  (AFRICAN AMERICAN): >60 ML/MIN/1.73 M^2
EST. GFR  (NON AFRICAN AMERICAN): >60 ML/MIN/1.73 M^2
GLUCOSE SERPL-MCNC: 162 MG/DL
HCT VFR BLD AUTO: 33.1 %
HGB BLD-MCNC: 10.9 G/DL
LYMPHOCYTES # BLD AUTO: 1 K/UL
LYMPHOCYTES NFR BLD: 16.5 %
MCH RBC QN AUTO: 27.8 PG
MCHC RBC AUTO-ENTMCNC: 32.9 G/DL
MCV RBC AUTO: 84 FL
MONOCYTES # BLD AUTO: 0.7 K/UL
MONOCYTES NFR BLD: 11.5 %
NEUTROPHILS # BLD AUTO: 4.5 K/UL
NEUTROPHILS NFR BLD: 71.4 %
PLATELET # BLD AUTO: 252 K/UL
PMV BLD AUTO: 7.5 FL
POCT GLUCOSE: 167 MG/DL (ref 70–110)
POCT GLUCOSE: 187 MG/DL (ref 70–110)
POTASSIUM SERPL-SCNC: 3.9 MMOL/L
RBC # BLD AUTO: 3.92 M/UL
SODIUM SERPL-SCNC: 142 MMOL/L
WBC # BLD AUTO: 6.3 K/UL

## 2019-01-23 PROCEDURE — G8988 SELF CARE GOAL STATUS: HCPCS | Mod: CJ

## 2019-01-23 PROCEDURE — 25000003 PHARM REV CODE 250: Performed by: INTERNAL MEDICINE

## 2019-01-23 PROCEDURE — 97530 THERAPEUTIC ACTIVITIES: CPT

## 2019-01-23 PROCEDURE — 85025 COMPLETE CBC W/AUTO DIFF WBC: CPT

## 2019-01-23 PROCEDURE — 25000003 PHARM REV CODE 250: Performed by: ORTHOPAEDIC SURGERY

## 2019-01-23 PROCEDURE — 80048 BASIC METABOLIC PNL TOTAL CA: CPT

## 2019-01-23 PROCEDURE — 97165 OT EVAL LOW COMPLEX 30 MIN: CPT

## 2019-01-23 PROCEDURE — 97535 SELF CARE MNGMENT TRAINING: CPT

## 2019-01-23 PROCEDURE — 97116 GAIT TRAINING THERAPY: CPT

## 2019-01-23 PROCEDURE — 63600175 PHARM REV CODE 636 W HCPCS: Performed by: ORTHOPAEDIC SURGERY

## 2019-01-23 PROCEDURE — 94799 UNLISTED PULMONARY SVC/PX: CPT

## 2019-01-23 PROCEDURE — 97110 THERAPEUTIC EXERCISES: CPT

## 2019-01-23 PROCEDURE — 27000221 HC OXYGEN, UP TO 24 HOURS

## 2019-01-23 PROCEDURE — G8987 SELF CARE CURRENT STATUS: HCPCS | Mod: CK

## 2019-01-23 PROCEDURE — 94761 N-INVAS EAR/PLS OXIMETRY MLT: CPT

## 2019-01-23 PROCEDURE — 36415 COLL VENOUS BLD VENIPUNCTURE: CPT

## 2019-01-23 RX ORDER — ASPIRIN 325 MG
325 TABLET ORAL 2 TIMES DAILY
Refills: 0
Start: 2019-01-23 | End: 2019-03-06

## 2019-01-23 RX ORDER — OXYCODONE AND ACETAMINOPHEN 10; 325 MG/1; MG/1
1 TABLET ORAL EVERY 6 HOURS PRN
Refills: 0
Start: 2019-01-23 | End: 2019-02-06

## 2019-01-23 RX ADMIN — ASPIRIN 325 MG ORAL TABLET 325 MG: 325 PILL ORAL at 09:01

## 2019-01-23 RX ADMIN — ONDANSETRON 4 MG: 2 INJECTION INTRAMUSCULAR; INTRAVENOUS at 09:01

## 2019-01-23 RX ADMIN — FENOFIBRATE 145 MG: 145 TABLET, FILM COATED ORAL at 09:01

## 2019-01-23 RX ADMIN — SODIUM CHLORIDE: 0.9 INJECTION, SOLUTION INTRAVENOUS at 12:01

## 2019-01-23 RX ADMIN — SIMVASTATIN 20 MG: 10 TABLET, FILM COATED ORAL at 09:01

## 2019-01-23 RX ADMIN — OMEGA-3 FATTY ACIDS CAP 1000 MG 2 CAPSULE: 1000 CAP at 09:01

## 2019-01-23 RX ADMIN — OXYCODONE HYDROCHLORIDE 10 MG: 10 TABLET, FILM COATED, EXTENDED RELEASE ORAL at 09:01

## 2019-01-23 RX ADMIN — CELECOXIB 200 MG: 100 CAPSULE ORAL at 09:01

## 2019-01-23 RX ADMIN — MUPIROCIN 1 G: 20 OINTMENT TOPICAL at 09:01

## 2019-01-23 RX ADMIN — HYDROCHLOROTHIAZIDE 12.5 MG: 12.5 CAPSULE ORAL at 09:01

## 2019-01-23 RX ADMIN — CEFAZOLIN SODIUM 2 G: 2 SOLUTION INTRAVENOUS at 12:01

## 2019-01-23 NOTE — PLAN OF CARE
Problem: Adult Inpatient Plan of Care  Goal: Plan of Care Review  Pt. Son staying with mother all night. IV site wnl. Right knee dressing wnl. Ice to area as ordered.SARA drain with approximately 30 cc red drainage. [place] patent  draining large amount of light yellow urine. Foot pumps as ordered.Neurovascular checks wnl.

## 2019-01-23 NOTE — DISCHARGE SUMMARY
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     Discharged Condition: Good    Hospital Course:   Patient is a 68 y.o. female admitted to Hospitalist Service from Operation Room s/p right total hip  arthroplasty performed by Dr. Nicole. Patient reportedly has past medical history significant for OA, hypertension and DM-2. Post-operatively, patient denied chest pain, shortness of breath, abdominal pain, nausea, vomiting, headache, vision changes, focal neuro-deficits, cough or fever. Patient was admitted to Hospitalist medicine service. Patient was followed by Dr. Nicole. Post-operative, patient did well. Pain adequately controlled. Patient participated with physical therapy. Home health and home physical therapy has been arranged. Fall precautions discussed with the patient. Patient to follow up with primary care physician next week and orthopedic doctor in 2 weeks. Post-operative anti-coagulation as per orthopedics recommendations advised. In case of chest pain, shortness of breath, stroke or stroke like symptoms, high grade fever or any signs or symptoms of surgical site wound infection symptoms, patient to return to nearest emergency room as soon as possible. Patient was discharged home in stable condition with following discharge plan of care. Total time with the patient was 30 minutes and greater than 50% was spent in counseling and coordination of care. The assessment and plan have been discussed at length. Physicians' notes reviewed. Labs and procedure  reviewed.     Consults: Dr. Nicole    Significant Diagnostic Studies:   Right hip x-ray: Right hip arthroplasty without complicating factors.    Microbiology Results (last 7 days)     ** No results found for the last 168 hours. **        Special Treatments/Procedures: as above  Disposition: Home or Self Care    Medications:  Reconciled Home Medications:   Current Discharge Medication List      START taking these medications    Details   aspirin 325 MG tablet Take 1 tablet (325 mg total) by mouth 2 (two) times daily.  Refills: 0      oxyCODONE-acetaminophen (PERCOCET)  mg per tablet Take 1 tablet by mouth every 6 (six) hours as needed for Pain.  Refills: 0         CONTINUE these medications which have NOT CHANGED    Details   atenolol (TENORMIN) 100 MG tablet atenolol 100 mg tablet      dapagliflozin-metformin (XIGDUO XR) 5-1,000 mg TBph Xigduo XR 5 mg-1,000 mg tablet,extended release   Take 1 tablet twice a day by oral route for 90 days.      fenofibrate (TRICOR) 145 MG tablet fenofibrate nanocrystallized 145 mg tablet      insulin aspart protamine-insulin aspart (NOVOLOG MIX 70-30FLEXPEN U-100) 100 unit/mL (70-30) InPn pen Novolog Mix 70-30 FlexPen U-100 Insulin 100 unit/mL subcutaneous pen      omega-3 acid ethyl esters (LOVAZA) 1 gram capsule Take 2 g by mouth once daily.       quinapril-hydrochlorothiazide (ACCURETIC) 20-12.5 mg per tablet quinapril 20 mg-hydrochlorothiazide 12.5 mg tablet      simvastatin (ZOCOR) 20 MG tablet every other day. HS      VICTOZA 3-NANCY 0.6 mg/0.1 mL (18 mg/3 mL) PnIj 2 (two) times daily. 1.8 MG AM AND 1.2 MG HS      diclofenac (VOLTAREN) 75 MG EC tablet 2 (two) times daily.          STOP taking these medications       aspirin (ECOTRIN) 81 MG EC tablet Comments:   Reason for Stopping:             Discharge Procedure Orders   Diet Cardiac     Diet diabetic     Other restrictions (specify):   Order Comments: PLEASE OBSERVE FALL PRECAUTIONS.    Hip precautions     Call MD for:    Order Comments: For worsening symptoms, chest pain, shortness of breath, increased abdominal pain, high grade fever, stroke or stroke like symptoms, immediately go to the nearest Emergency Room or call 911 as soon as possible.     Follow-up Information     Jose Nicole Jr, MD On 2/6/2019.    Specialties:  Orthopedic Surgery, General Surgery, Surgery  Why:  @1:00pm   Contact information:  [address] [address]  [phone]             Jose Mary MD In 1 week.    Specialty:  Family Medicine  Contact information:  [address] [facility] [id number] [phone]

## 2019-01-23 NOTE — PLAN OF CARE
01/22/19 2010   Patient Assessment/Suction   Level of Consciousness (AVPU) alert   PRE-TX-O2-ETCO2   O2 Device (Oxygen Therapy) nasal cannula   Flow (L/min) 2   Oxygen Concentration (%) 28   SpO2 99 %   Pulse Oximetry Type Intermittent   Pulse 89   Resp 20   Incentive Spirometer   $ Incentive Spirometer Charges done with encouragement   Administration (IS) proper technique demonstrated   Number of Repetitions (IS) 10   Level Incentive Spirometer (mL) 2000   Patient Tolerance (IS) good   Ready to Wean/Extubation Screen   FIO2<=50 (chart decimal) 0.28

## 2019-01-23 NOTE — PLAN OF CARE
Problem: Adult Inpatient Plan of Care  Goal: Plan of Care Review  Outcome: Ongoing (interventions implemented as appropriate)  Patient aao x 4. Plan of care reviewed with patient, verbalized understanding. Surgical dressing clean dry and intact. IV fluids maintained. IV antibiotic given. Ambulated with PT. Remained free from fall and injury. Bed in lowest position and call light within reach. Family to remain at bedside.

## 2019-01-23 NOTE — PLAN OF CARE
Problem: Physical Therapy Goal  Goal: Physical Therapy Goal  Goals to be met by: 2019     Patient will increase functional independence with mobility by performin. Supine to sit with MInimal Assistance  2. Sit to stand transfer with Minimal Assistance  3. Bed to chair transfer with Minimal Assistance using Rolling Walker  4. Gait  x 250 feet with Minimal Assistance using Rolling Walker.   5. Lower extremity exercise program x20 reps per handout, with assistance as needed     Outcome: Ongoing (interventions implemented as appropriate)  PT BID for bed mobility, transfer training, gait training and therex

## 2019-01-23 NOTE — PLAN OF CARE
Problem: Adult Inpatient Plan of Care  Goal: Plan of Care Review  Outcome: Ongoing (interventions implemented as appropriate)  Placed patient on room air and IS at bedside to use Q1 hrs

## 2019-01-23 NOTE — PROGRESS NOTES
Pod1,   N/v ok,  oobed some,  Drain out hct 33,         prob d/c later today,   perc10 w zofran  asa

## 2019-01-23 NOTE — PT/OT/SLP PROGRESS
Physical Therapy Treatment    Patient Name:  Marifer Bernardo   MRN:  [id number]    Recommendations:     Discharge Recommendations:  (HHPT)       Assessment:     Marifer Bernardo is a 68 y.o. female admitted with a medical diagnosis of Status post total replacement of right hip.  She presents with the following impairments/functional limitations:  weakness, impaired endurance, impaired functional mobilty, gait instability, impaired balance, decreased lower extremity function, pain, decreased ROM, orthopedic precautions .    Rehab Prognosis: Good; patient would benefit from acute skilled PT services to address these deficits and reach maximum level of function.    Recent Surgery: Procedure(s) (LRB):  ARTHROPLASTY, HIP (Right) 1 Day Post-Op    Plan:     During this hospitalization, patient to be seen BID to address the identified rehab impairments via gait training, therapeutic activities, therapeutic exercises and progress toward the following goals:    · Plan of Care Expires:  02/01/19    Subjective     Chief Complaint: some pain in hip  Patient/Family Comments/goals: agreeable to PT. Reported she needed to get out that bed this morning  Pain/Comfort:  · Pain Rating 1: (did not rate)  · Location - Side 1: Left  · Location 1: hip  · Pain Addressed 1: Pre-medicate for activity, Nurse notified      Objective:     Communicated with nurse [place] prior to session.  Patient found seated in chair SCD, peripheral IV  upon PT entry to room.     General Precautions: Standard, fall   Orthopedic Precautions:RLE weight bearing as tolerated, RLE posterior precautions   Braces: N/A     Functional Mobility:  · Transfers:     · Sit to Stand:  contact guard assistance with rolling walker and cueing for tech  · Toilet transfer: CGA with RW; stand pivot to bedside commode    · Gait: 125' with CGA using RW.         Therapeutic Activities and Exercises:   Pt assisted on/off commode with CGA post gait. Pt required SBA for toileting to ensure  safety. pt not able to void at this time. Pt then ambulated around room to chair with CGA    Seated AP, LAQ, glute sets x 10 reps, pt instructed to continue to perform as tolerated throughout the day.     Patient left up in chair with all lines intact, call button in reach and nurse Ivelisse notified..    GOALS:   Multidisciplinary Problems     Physical Therapy Goals        Problem: Physical Therapy Goal    Goal Priority Disciplines Outcome Goal Variances Interventions   Physical Therapy Goal     PT, PT/OT Ongoing (interventions implemented as appropriate)     Description:  Goals to be met by: 2019     Patient will increase functional independence with mobility by performin. Supine to sit with MInimal Assistance  2. Sit to stand transfer with Minimal Assistance  3. Bed to chair transfer with Minimal Assistance using Rolling Walker  4. Gait  x 250 feet with Minimal Assistance using Rolling Walker.   5. Lower extremity exercise program x20 reps per handout, with assistance as needed                      Time Tracking:     PT Received On: 19  PT Start Time: 0840     PT Stop Time: 0905  PT Total Time (min): 25 min     Billable Minutes: Gait Training 9, Therapeutic Activity8 and Therapeutic Exercise 8    Treatment Type: Treatment  PT/PTA: PTA     PTA Visit Number: 1     Mahi Juarez, PTA  2019

## 2019-01-23 NOTE — PLAN OF CARE
Problem: Occupational Therapy Goal  Goal: Occupational Therapy Goal  Goals to be met by: 2/6/2019     Patient will increase functional independence with ADLs by performing:    LE Dressing with Modified [place] and Assistive Devices as needed.  Grooming while standing at sink with Modified [place].  Toileting from toilet with Modified [place] for hygiene and clothing management.   Supine to sit with Supervision.  Toilet transfer to toilet with Modified [place].    Outcome: Ongoing (interventions implemented as appropriate)  OT evaluation completed today. Goals & care plan established.    Moisés Nicholson, OT  1/23/2019

## 2019-01-23 NOTE — PT/OT/SLP PROGRESS
"Physical Therapy Treatment    Patient Name:  Marifer Bernardo   MRN:  [id number]    Recommendations:     Discharge Recommendations:  (HHPT)       Assessment:     Marifer Bernardo is a 68 y.o. female admitted with a medical diagnosis of Status post total replacement of right hip.  She presents with the following impairments/functional limitations:  weakness, impaired endurance, impaired functional mobilty, gait instability, impaired balance, decreased lower extremity function, decreased ROM, orthopedic precautions .    Rehab Prognosis: Good; patient would benefit from acute skilled PT services to address these deficits and reach maximum level of function.    Recent Surgery: Procedure(s) (LRB):  ARTHROPLASTY, HIP (Right) 1 Day Post-Op    Plan:     During this hospitalization, patient to be seen BID to address the identified rehab impairments via gait training, therapeutic activities, therapeutic exercises and progress toward the following goals:    · Plan of Care Expires:  02/01/19    Subjective     Chief Complaint: R hip soreness "like someone hit it with a baseball bat."  Patient/Family Comments/goals: eager to walk, needing to use restroom prior to walking  Pain/Comfort:  · Pain Rating 1: (soreness )  · Location - Side 1: Left  · Location 1: hip  · Pain Addressed 1: Reposition, Distraction, Cessation of Activity, Nurse notified      Objective:     Communicated with nurse [place] prior to session.  Patient found supine  upon PT entry to room.     General Precautions: Standard, fall   Orthopedic Precautions:RLE weight bearing as tolerated, RLE posterior precautions   Braces: N/A     Functional Mobility:  · Bed Mobility:     · Scooting: minimum assistance  · Supine to Sit: minimum assistance    · Transfers:     · Sit to Stand:  stand by assistance and contact guard assistance with rolling walker  · Toilet Transfer: contact guard assistance with  rolling walker  using  Stand Pivot    · Gait: 175' with CGA using " RW        Therapeutic Activities and Exercises:   pt ambulated to restroom with CGA. Pt assisted on/off commode with SBA-CGA. Pt able to perform own hygiene task   pt then proceeded to gait in hallway.     pt assisted to sitting EOB, per pt request, post gait.  now present    Patient left seated EOB with all lines intact, call button in reach, nurse Ivelisse notified and  present..    GOALS:   Multidisciplinary Problems     Physical Therapy Goals        Problem: Physical Therapy Goal    Goal Priority Disciplines Outcome Goal Variances Interventions   Physical Therapy Goal     PT, PT/OT Ongoing (interventions implemented as appropriate)     Description:  Goals to be met by: 2019     Patient will increase functional independence with mobility by performin. Supine to sit with MInimal Assistance  2. Sit to stand transfer with Minimal Assistance  3. Bed to chair transfer with Minimal Assistance using Rolling Walker  4. Gait  x 250 feet with Minimal Assistance using Rolling Walker.   5. Lower extremity exercise program x20 reps per handout, with assistance as needed                      Time Tracking:     PT Received On: 19  PT Start Time: 1347     PT Stop Time: 1404  PT Total Time (min): 17 min     Billable Minutes: Gait Training 8 and Therapeutic Activity 9    Treatment Type: Treatment  PT/PTA: PTA     PTA Visit Number: 1     Mahi Juarez, PTA  2019

## 2019-01-23 NOTE — PT/OT/SLP EVAL
Occupational Therapy   Evaluation    Name: Marifer Bernardo  MRN: [id number]  Admitting Diagnosis:  Status post total replacement of right hip 1 Day Post-Op    Recommendations:     Discharge Recommendations: other (see comments)(HHPT)  Discharge Equipment Recommendations:  none  Barriers to discharge:  None    Assessment:     Marifer Bernardo is a 68 y.o. female with a medical diagnosis of Status post total replacement of right hip.  Performance deficits affecting function: weakness, impaired endurance, impaired self care skills, impaired functional mobilty, gait instability, impaired balance, decreased lower extremity function, orthopedic precautions, pain, decreased ROM. Pt required max A with LE dressing and SBA with grooming tasks at sink. Pt required CGA and RW with toilet transfers. Pt would benefit from HHPT to increase functional mobility.    Rehab Prognosis: Good; patient would benefit from acute skilled OT services to address these deficits and reach maximum level of function.       Plan:     Patient to be seen 3 x/week to address the above listed problems via self-care/home management, therapeutic activities, therapeutic exercises  · Plan of Care Expires: 01/30/19  · Plan of Care Reviewed with: patient, spouse    Subjective     Chief Complaint: R hip pain  Patient/Family Comments/goals: to go home    Occupational Profile:  Living Environment: Pt lives with .  Previous level of function: Pt was mod I with ADLs and ambulatory with RW.  Equipment Used at Home:  walker, rolling, shower chair, bedside commode  Assistance upon Discharge: Pt will receive assistance from .    Pain/Comfort:  · Pain Rating 1: 7/10  · Location - Side 1: Right  · Location - Orientation 1: generalized  · Location 1: hip  · Pain Addressed 1: Nurse notified, Distraction, Reposition  · Pain Rating Post-Intervention 1: 7/10    Patients cultural, spiritual, Faith conflicts given the current situation:      Objective:      Communicated with: nurse [place] prior to session.  Patient found HOB elevated with telemetry upon OT entry to room.    General Precautions: Standard, fall   Orthopedic Precautions:RLE weight bearing as tolerated, RLE posterior precautions   Braces: N/A     Occupational Performance:    Bed Mobility:    · Patient completed Scooting/Bridging with stand by assistance  · Patient completed Supine to Sit with minimum assistance  · Patient completed Sit to Supine with minimum assistance    Functional Mobility/Transfers:  · Patient completed Sit <> Stand Transfer with contact guard assistance  with  rolling walker   · Patient completed Toilet Transfer Stand Pivot technique with contact guard assistance with  rolling walker  · Functional Mobility: Pt ambulated in room with CGA and RW from EOB>sink>bathroom>EOB again. No LOB.    Activities of Daily Living:  · Grooming: stand by assistance with hair grooming while standing at sink.  · Lower Body Dressing: maximal assistance to don/doff socks at EOB.  · Toileting: supervision with marck-hygiene after voiding on toilet.    Cognitive/Visual Perceptual:  Cognitive/Psychosocial Skills:     -       Oriented to: x4   -       Follows Commands/attention:Follows multistep  commands  -       Communication: clear/fluent  -       Safety awareness/insight to disability: intact   -       Mood/Affect/Coping skills/emotional control: Appropriate to situation  Visual/Perceptual:      -Intact     Physical Exam:  Postural examination/scapula alignment:    -       Rounded shoulders  -       Forward head  Upper Extremity Range of Motion:     -       Right Upper Extremity: WFL  -       Left Upper Extremity: WFL  Upper Extremity Strength:    -       Right Upper Extremity: WFL  -       Left Upper Extremity: WFL   Strength:    -       Right Upper Extremity: WFL  -       Left Upper Extremity: WFL  Fine Motor Coordination:    -       Intact  Gross motor coordination:   WFL    [facility] 6 Click  "ADL:  AMPAC Total Score: 19    Treatment & Education:  OT ed patient on safety with walker use for functional mobility with cues for hand placement & sequencing.   OT ed pt on use of adaptive equipment for LB dressing & safe item retrieval with reacher with demonstration provided.    Education:    Patient left HOB elevated with call button in reach and  present    GOALS:   Multidisciplinary Problems     Occupational Therapy Goals        Problem: Occupational Therapy Goal    Goal Priority Disciplines Outcome Interventions   Occupational Therapy Goal     OT, PT/OT Ongoing (interventions implemented as appropriate)    Description:  Goals to be met by: 2019     Patient will increase functional independence with ADLs by performing:    LE Dressing with Modified [place] and Assistive Devices as needed.  Grooming while standing at sink with Modified [place].  Toileting from toilet with Modified [place] for hygiene and clothing management.   Supine to sit with Supervision.  Toilet transfer to toilet with Modified [place].                      History:     Past Medical History:   Diagnosis Date    Arthritis     Diabetes mellitus, type 2     Encounter for blood transfusion     Hypertension     PONV (postoperative nausea and vomiting)     Wears glasses        Past Surgical History:   Procedure Laterality Date    ARTHROPLASTY, HIP Right 2019    Performed by Jose Nicole Jr., [facility] at [facility] OR    HIP SURGERY      HYSTERECTOMY      JOINT REPLACEMENT Left     HIP    PARTIAL HYSTERECTOMY      partical toe amputation         Clinical Decision Makin.  OT Low:  "Pt evaluation falls under low complexity for evaluation coding due to performance deficits noted in 1-3 areas as stated above and 0 co-morbities affecting current functional status. Data obtained from problem focused assessments. No modifications or assistance was required for completion of evaluation. Only brief " "occupational profile and history review completed."     Time Tracking:     OT Date of Treatment: 01/23/19  OT Start Time: 1112  OT Stop Time: 1144  OT Total Time (min): 32 min    Billable Minutes:Evaluation 10  Self Care/Home Management 22    Moisés Nicholson OT  1/23/2019    "

## 2019-01-23 NOTE — NURSING
Discharge instructions given to patient, verbalized understanding. Dressing changed, dry and intact. L Pedis pulse +2. Peripheral IV removed, tolerated well. Left floor via wheelchair with spouse at side. Remained free from fall and injury.

## 2019-01-23 NOTE — PLAN OF CARE
Attempted to see pt, she was working with OT. Pt has discharge orders with no needs per AVS. Nurse verified the pt has her RW and BSC.....JYOTI Paulson       01/23/19 1873   Discharge Assessment   Assessment Type Discharge Planning Assessment

## 2019-01-24 NOTE — PLAN OF CARE
01/24/19 1351   Final Note   Assessment Type Final Discharge Note   Anticipated Discharge Disposition Home

## 2019-01-28 ENCOUNTER — TELEPHONE (OUTPATIENT)
Dept: ORTHOPEDICS | Facility: CLINIC | Age: 69
End: 2019-01-28

## 2019-01-28 NOTE — TELEPHONE ENCOUNTER
The patient called to find out when will it safe for her to shower she just had surgery 01/22/2019   Dr wrote prescription for pain medication and [facility] said that the prescription was not written out right   Patient also wants to know when she can get back on her rotarian

## 2019-02-06 ENCOUNTER — OFFICE VISIT (OUTPATIENT)
Dept: ORTHOPEDICS | Facility: CLINIC | Age: 69
End: 2019-02-06
Payer: MEDICARE

## 2019-02-06 VITALS
DIASTOLIC BLOOD PRESSURE: 78 MMHG | SYSTOLIC BLOOD PRESSURE: 138 MMHG | WEIGHT: 250 LBS | BODY MASS INDEX: 42.68 KG/M2 | HEART RATE: 76 BPM | HEIGHT: 64 IN

## 2019-02-06 DIAGNOSIS — Z96.641 STATUS POST TOTAL REPLACEMENT OF RIGHT HIP: Primary | ICD-10-CM

## 2019-02-06 PROCEDURE — 99024 PR POST-OP FOLLOW-UP VISIT: ICD-10-PCS | Mod: ,,, | Performed by: ORTHOPAEDIC SURGERY

## 2019-02-06 PROCEDURE — 72170 X-RAY EXAM OF PELVIS: CPT | Mod: ,,, | Performed by: ORTHOPAEDIC SURGERY

## 2019-02-06 PROCEDURE — 99024 POSTOP FOLLOW-UP VISIT: CPT | Mod: ,,, | Performed by: ORTHOPAEDIC SURGERY

## 2019-02-06 PROCEDURE — 72170 PR  X-RAY PELVIS 1/2 VW: ICD-10-PCS | Mod: ,,, | Performed by: ORTHOPAEDIC SURGERY

## 2019-02-06 RX ORDER — ONDANSETRON 4 MG/1
4 TABLET, FILM COATED ORAL EVERY 6 HOURS PRN
Qty: 20 TABLET | Refills: 0 | Status: SHIPPED | OUTPATIENT
Start: 2019-02-06 | End: 2019-04-17

## 2019-02-06 RX ORDER — HYDROCODONE BITARTRATE AND ACETAMINOPHEN 10; 325 MG/1; MG/1
1 TABLET ORAL EVERY 6 HOURS PRN
Qty: 30 TABLET | Refills: 0 | Status: SHIPPED | OUTPATIENT
Start: 2019-02-06 | End: 2019-02-13

## 2019-03-06 ENCOUNTER — OFFICE VISIT (OUTPATIENT)
Dept: ORTHOPEDICS | Facility: CLINIC | Age: 69
End: 2019-03-06
Payer: MEDICARE

## 2019-03-06 VITALS
HEART RATE: 78 BPM | HEIGHT: 64 IN | SYSTOLIC BLOOD PRESSURE: 138 MMHG | BODY MASS INDEX: 44.05 KG/M2 | DIASTOLIC BLOOD PRESSURE: 68 MMHG | WEIGHT: 258 LBS

## 2019-03-06 DIAGNOSIS — Z96.641 STATUS POST TOTAL REPLACEMENT OF RIGHT HIP: Primary | ICD-10-CM

## 2019-03-06 PROCEDURE — 99024 PR POST-OP FOLLOW-UP VISIT: ICD-10-PCS | Mod: ,,, | Performed by: ORTHOPAEDIC SURGERY

## 2019-03-06 PROCEDURE — 99024 POSTOP FOLLOW-UP VISIT: CPT | Mod: ,,, | Performed by: ORTHOPAEDIC SURGERY

## 2019-03-06 NOTE — PROGRESS NOTES
So this lady 6 weeks post total hip on the right. The hip itself is doing well she still using a walker because a little instability and insecurity little weakness. No pain [facility] POST-OP NOTE    Subjective:           Chief Complaint:   Chief Complaint   Patient presents with    Right Hip - Post-op Evaluation     Right FABIANA 1.22.19. States that her hip is doing good.        Past Medical History:   Diagnosis Date    Arthritis     Diabetes mellitus, type 2     Encounter for blood transfusion     Hypertension     PONV (postoperative nausea and vomiting)     Wears glasses        Past Surgical History:   Procedure Laterality Date    ARTHROPLASTY, HIP Right 1/22/2019    Performed by Jose Nicole Jr., MD at [facility] OR    HIP SURGERY      HYSTERECTOMY      JOINT REPLACEMENT Left     HIP    PARTIAL HYSTERECTOMY      partical toe amputation         Current Outpatient Medications   Medication Sig    atenolol (TENORMIN) 100 MG tablet atenolol 100 mg tablet    dapagliflozin-metformin (XIGDUO XR) 5-1,000 mg TBph Xigduo XR 5 mg-1,000 mg tablet,extended release   Take 1 tablet twice a day by oral route for 90 days.    fenofibrate (TRICOR) 145 MG tablet fenofibrate nanocrystallized 145 mg tablet    insulin aspart protamine-insulin aspart (NOVOLOG MIX 70-30FLEXPEN U-100) 100 unit/mL (70-30) InPn pen Novolog Mix 70-30 FlexPen U-100 Insulin 100 unit/mL subcutaneous pen    omega-3 acid ethyl esters (LOVAZA) 1 gram capsule Take 2 g by mouth once daily.     ondansetron (ZOFRAN) 4 MG tablet Take 1 tablet (4 mg total) by mouth every 6 (six) hours as needed for Nausea.    quinapril-hydrochlorothiazide (ACCURETIC) 20-12.5 mg per tablet quinapril 20 mg-hydrochlorothiazide 12.5 mg tablet    simvastatin (ZOCOR) 20 MG tablet every other day. HS    VICTOZA 3-NANCY 0.6 mg/0.1 mL (18 mg/3 mL) PnIj 2 (two) times daily. 1.8 MG AM AND 1.2 MG HS    diclofenac (VOLTAREN) 75 MG EC tablet 2 (two) times daily.      No  current facility-administered medications for this visit.        Review of patient's allergies indicates:   Allergen Reactions    Banana Hives    Penicillins Hives       History reviewed. No pertinent family history.    Social History     Socioeconomic History    Marital status:      Spouse name: Not on file    Number of children: Not on file    Years of education: Not on file    Highest education level: Not on file   Social Needs    Financial resource strain: Not on file    Food insecurity - worry: Not on file    Food insecurity - inability: Not on file    Transportation needs - medical: Not on file    Transportation needs - non-medical: Not on file   Occupational History    Not on file   Tobacco Use    Smoking status: Former Smoker     Last attempt to quit: 1979     Years since quittin.1    Smokeless tobacco: Never Used   Substance and Sexual Activity    Alcohol use: No     Frequency: Never    Drug use: No    Sexual activity: Not on file   Other Topics Concern    Not on file   Social History Narrative    Not on file       History of present illness: Laurita try that again this lady 6 weeks post total hip. Very little discomfort taking no pain medicines would like to take Voltaren for other things. Walking with her walker sometimes because of insecurity and weakness      Review of Systems:    Musculoskeletal:  See HPI      Objective:        Physical Examination:    Vital Signs:    Vitals:    19 1402   BP: 138/68   Pulse: 78       Body mass index is 44.29 kg/m².    This a well-developed, well nourished patient in no acute distress.  They are alert and oriented and cooperative to examination.        So physical exam shows the right hip looks good wound looks good range of motion is good is no pain length may be a half a centimeter longer than the other one. She can stand up nicely but she just a little unstable when she walks and she is grossly obese.  Pertinent New  Results:    XRAY Report / Interpretation:       Assessment/Plan:      So my impression is total hip is doing well she needs strength and insecurity so we will start weaning off the walker we need physical therapy to make her stronger. Does not need medicines. Certainly can go back on Voltaren for other aches and pains. See her back in 6 weeks. I would like an AP x-ray of her hip next time    This note was created using Dragon voice recognition software that occasionally misinterpreted phrases or words.

## 2019-04-16 ENCOUNTER — OFFICE VISIT (OUTPATIENT)
Dept: PODIATRY | Facility: CLINIC | Age: 69
End: 2019-04-16
Payer: MEDICARE

## 2019-04-16 ENCOUNTER — HOSPITAL ENCOUNTER (OUTPATIENT)
Dept: RADIOLOGY | Facility: HOSPITAL | Age: 69
Discharge: HOME | End: 2019-04-16
Attending: PODIATRIST
Payer: MEDICARE

## 2019-04-16 VITALS
HEIGHT: 64 IN | BODY MASS INDEX: 43.54 KG/M2 | WEIGHT: 255 LBS | TEMPERATURE: 98 F | HEART RATE: 70 BPM | SYSTOLIC BLOOD PRESSURE: 155 MMHG | DIASTOLIC BLOOD PRESSURE: 79 MMHG

## 2019-04-16 DIAGNOSIS — L97.522 SKIN ULCER OF LEFT FOOT WITH FAT LAYER EXPOSED: ICD-10-CM

## 2019-04-16 DIAGNOSIS — E10.40 TYPE 1 DIABETES MELLITUS WITH DIABETIC NEUROPATHY: Primary | ICD-10-CM

## 2019-04-16 DIAGNOSIS — E10.40 TYPE 1 DIABETES MELLITUS WITH DIABETIC NEUROPATHY: ICD-10-CM

## 2019-04-16 PROCEDURE — 11042 WOUND DEBRIDEMENT: ICD-10-PCS | Mod: S$GLB,,, | Performed by: PODIATRIST

## 2019-04-16 PROCEDURE — 99999 PR PBB SHADOW E&M-EST. PATIENT-LVL III: CPT | Mod: PBBFAC,,, | Performed by: PODIATRIST

## 2019-04-16 PROCEDURE — 87070 CULTURE OTHR SPECIMN AEROBIC: CPT

## 2019-04-16 PROCEDURE — 99999 PR PBB SHADOW E&M-EST. PATIENT-LVL III: ICD-10-PCS | Mod: PBBFAC,,, | Performed by: PODIATRIST

## 2019-04-16 PROCEDURE — 99204 PR OFFICE/OUTPT VISIT, NEW, LEVL IV, 45-59 MIN: ICD-10-PCS | Mod: 25,S$GLB,, | Performed by: PODIATRIST

## 2019-04-16 PROCEDURE — 1101F PR PT FALLS ASSESS DOC 0-1 FALLS W/OUT INJ PAST YR: ICD-10-PCS | Mod: CPTII,S$GLB,, | Performed by: PODIATRIST

## 2019-04-16 PROCEDURE — 1101F PT FALLS ASSESS-DOCD LE1/YR: CPT | Mod: CPTII,S$GLB,, | Performed by: PODIATRIST

## 2019-04-16 PROCEDURE — 99204 OFFICE O/P NEW MOD 45 MIN: CPT | Mod: 25,S$GLB,, | Performed by: PODIATRIST

## 2019-04-16 PROCEDURE — 3078F DIAST BP <80 MM HG: CPT | Mod: CPTII,S$GLB,, | Performed by: PODIATRIST

## 2019-04-16 PROCEDURE — 3044F HG A1C LEVEL LT 7.0%: CPT | Mod: CPTII,S$GLB,, | Performed by: PODIATRIST

## 2019-04-16 PROCEDURE — 87186 SC STD MICRODIL/AGAR DIL: CPT

## 2019-04-16 PROCEDURE — 73630 X-RAY EXAM OF FOOT: CPT | Mod: 26,LT,, | Performed by: RADIOLOGY

## 2019-04-16 PROCEDURE — 3077F SYST BP >= 140 MM HG: CPT | Mod: CPTII,S$GLB,, | Performed by: PODIATRIST

## 2019-04-16 PROCEDURE — 3044F PR MOST RECENT HEMOGLOBIN A1C LEVEL <7.0%: ICD-10-PCS | Mod: CPTII,S$GLB,, | Performed by: PODIATRIST

## 2019-04-16 PROCEDURE — 87077 CULTURE AEROBIC IDENTIFY: CPT

## 2019-04-16 PROCEDURE — 11042 DBRDMT SUBQ TIS 1ST 20SQCM/<: CPT | Mod: S$GLB,,, | Performed by: PODIATRIST

## 2019-04-16 PROCEDURE — 3077F PR MOST RECENT SYSTOLIC BLOOD PRESSURE >= 140 MM HG: ICD-10-PCS | Mod: CPTII,S$GLB,, | Performed by: PODIATRIST

## 2019-04-16 PROCEDURE — 3078F PR MOST RECENT DIASTOLIC BLOOD PRESSURE < 80 MM HG: ICD-10-PCS | Mod: CPTII,S$GLB,, | Performed by: PODIATRIST

## 2019-04-16 PROCEDURE — 73630 XR FOOT COMPLETE 3 VIEW LEFT: ICD-10-PCS | Mod: 26,LT,, | Performed by: RADIOLOGY

## 2019-04-16 PROCEDURE — 73630 X-RAY EXAM OF FOOT: CPT | Mod: TC,PN,LT

## 2019-04-17 ENCOUNTER — OFFICE VISIT (OUTPATIENT)
Dept: ORTHOPEDICS | Facility: CLINIC | Age: 69
End: 2019-04-17
Payer: MEDICARE

## 2019-04-17 VITALS
BODY MASS INDEX: 43.54 KG/M2 | HEART RATE: 73 BPM | DIASTOLIC BLOOD PRESSURE: 70 MMHG | WEIGHT: 255 LBS | SYSTOLIC BLOOD PRESSURE: 138 MMHG | HEIGHT: 64 IN

## 2019-04-17 DIAGNOSIS — M17.11 PRIMARY OSTEOARTHRITIS OF RIGHT KNEE: ICD-10-CM

## 2019-04-17 DIAGNOSIS — Z96.641 STATUS POST TOTAL REPLACEMENT OF RIGHT HIP: Primary | ICD-10-CM

## 2019-04-17 PROCEDURE — 99024 POSTOP FOLLOW-UP VISIT: CPT | Mod: ,,, | Performed by: ORTHOPAEDIC SURGERY

## 2019-04-17 PROCEDURE — 72170 PR  X-RAY PELVIS 1/2 VW: ICD-10-PCS | Mod: ,,, | Performed by: ORTHOPAEDIC SURGERY

## 2019-04-17 PROCEDURE — 72170 X-RAY EXAM OF PELVIS: CPT | Mod: ,,, | Performed by: ORTHOPAEDIC SURGERY

## 2019-04-17 PROCEDURE — 99024 PR POST-OP FOLLOW-UP VISIT: ICD-10-PCS | Mod: ,,, | Performed by: ORTHOPAEDIC SURGERY

## 2019-04-17 NOTE — PROGRESS NOTES
[facility] POST-OP NOTE    Subjective:           Chief Complaint:   Chief Complaint   Patient presents with    Right Hip - Post-op Evaluation     Right FABIANA 1.22.19. States that her hip is doing good.        Past Medical History:   Diagnosis Date    Arthritis     Diabetes mellitus, type 2     Encounter for blood transfusion     Hypertension     PONV (postoperative nausea and vomiting)     Wears glasses        Past Surgical History:   Procedure Laterality Date    ARTHROPLASTY, HIP Right 1/22/2019    Performed by Jose Nicole Jr., MD at [facility] OR    HIP SURGERY      HYSTERECTOMY      JOINT REPLACEMENT Left     HIP    PARTIAL HYSTERECTOMY      partical toe amputation         Current Outpatient Medications   Medication Sig    atenolol (TENORMIN) 100 MG tablet atenolol 100 mg tablet    dapagliflozin-metformin (XIGDUO XR) 5-1,000 mg TBph Xigduo XR 5 mg-1,000 mg tablet,extended release   Take 1 tablet twice a day by oral route for 90 days.    diclofenac (VOLTAREN) 75 MG EC tablet 2 (two) times daily.     fenofibrate (TRICOR) 145 MG tablet fenofibrate nanocrystallized 145 mg tablet    insulin aspart protamine-insulin aspart (NOVOLOG MIX 70-30FLEXPEN U-100) 100 unit/mL (70-30) InPn pen Novolog Mix 70-30 FlexPen U-100 Insulin 100 unit/mL subcutaneous pen    omega-3 acid ethyl esters (LOVAZA) 1 gram capsule Take 2 g by mouth once daily.     quinapril-hydrochlorothiazide (ACCURETIC) 20-12.5 mg per tablet quinapril 20 mg-hydrochlorothiazide 12.5 mg tablet    simvastatin (ZOCOR) 20 MG tablet every other day. HS    VICTOZA 3-NANCY 0.6 mg/0.1 mL (18 mg/3 mL) PnIj 2 (two) times daily. 1.8 MG AM AND 1.2 MG HS     No current facility-administered medications for this visit.        Review of patient's allergies indicates:   Allergen Reactions    Banana Hives    Penicillins Hives       No family history on file.    Social History     Socioeconomic History    Marital status:      Spouse name: Not  on file    Number of children: Not on file    Years of education: Not on file    Highest education level: Not on file   Occupational History    Not on file   Social Needs    Financial resource strain: Not on file    Food insecurity:     Worry: Not on file     Inability: Not on file    Transportation needs:     Medical: Not on file     Non-medical: Not on file   Tobacco Use    Smoking status: Former Smoker     Last attempt to quit: 1979     Years since quittin.2    Smokeless tobacco: Never Used   Substance and Sexual Activity    Alcohol use: No     Frequency: Never    Drug use: No    Sexual activity: Not on file   Lifestyle    Physical activity:     Days per week: Not on file     Minutes per session: Not on file    Stress: Not on file   Relationships    Social connections:     Talks on phone: Not on file     Gets together: Not on file     Attends Hoahaoism service: Not on file     Active member of club or organization: Not on file     Attends meetings of clubs or organizations: Not on file     Relationship status: Not on file   Other Topics Concern    Not on file   Social History Narrative    Not on file       History of present illness: Total hip arthroplasty 6 weeks doing well with the hip. She's get back issues in the knee issues but the hip itself seems to be recovering 5      Review of Systems:    Musculoskeletal:  See HPI      Objective:        Physical Examination:    Vital Signs:    Vitals:    19 1044   BP: 138/70   Pulse: 73       Body mass index is 43.77 kg/m².    This a well-developed, well nourished patient in no acute distress.  They are alert and oriented and cooperative to examination.        Good range of motion right hip no pain wound looks very good on the right side length looks appropriate may be a quarter-inch nature 37 inch longer on the right but very minimal. She walks with a side to side limp which indicates that is primarily because of her knees and she does  have significant arthritis both knees an that's typical chronic gait from an arthritic knees. She uses her walker but does not depend on it heavily. Also has back issues  Pertinent New Results:    XRAY Report / Interpretation:   AP x-ray right hip shows total hip arthroplasty in good position. Good fit and fill on the femoral side good orientation the acetabulum length looks appropriate no acute changes.Electronically Signed By Jose Nicole JR, MD    Assessment/Plan:      's my impression she scat nice total hip on the right healing well. She does not have any more restrictions with a hip. I don't think that therapy is going to help her much because she's get too many other issues such as bilateral knees and back. Probably end up with a total knee in the future and she'll get back with us when she is ready to proceed along that path.    This note was created using Dragon voice recognition software that occasionally misinterpreted phrases or words.

## 2019-04-20 LAB — BACTERIA SPEC AEROBE CULT: NORMAL

## 2019-04-20 RX ORDER — CIPROFLOXACIN 500 MG/1
500 TABLET ORAL 2 TIMES DAILY
Qty: 28 TABLET | Refills: 0 | Status: SHIPPED | OUTPATIENT
Start: 2019-04-20 | End: 2019-05-04

## 2019-04-21 NOTE — PROGRESS NOTES
Subjective:       Patient ID: Marifer Bernardo is a 69 y.o. female.    Chief Complaint: Diabetes Mellitus; Foot Ulcer; Foot Problem; and Foot Pain    HPI patient presents today she has been experiencing swelling in her left foot she also has a chronic ulceration on the bottom of her left great toe patient relates that she had been seeing Dr. Nelson before he retired for a callus on her left big toe she states she subsequently saw Dr. Clemons in in November of 2018 Dr. Clemons did surgery to cut the tendon on the bottom of her left big toe ever since that time she has had a wound in this area that has drained from time to time and has never completely healed.  Patient had a hip replacement in January right side.  Patient is a type 1 diabetic times 20 years.  Patient states the ulceration that was initially on the bottom of her left great toe the doctor Omar had started out treating subsequently healed it was after it healed that Dr. Clemons did surgery to prevent it from coming back which is now led to the ulceration that she presents with today.  Patient has a bag of all different types of ointment that she has been applying to the area from time to.  Review of Systems   Musculoskeletal: Positive for arthralgias, gait problem and joint swelling.   Neurological: Positive for numbness.   All other systems reviewed and are negative.      Objective:      Physical Exam   Constitutional: She appears well-developed and well-nourished.   Cardiovascular:   Pulses:       Dorsalis pedis pulses are 1+ on the right side, and 1+ on the left side.        Posterior tibial pulses are 1+ on the right side, and 1+ on the left side.   Pulmonary/Chest: Effort normal.   Musculoskeletal: Normal range of motion. She exhibits edema, tenderness and deformity.        Left foot: There is deformity.   Feet:   Right Foot:   Protective Sensation: 4 sites tested. 3 sites sensed.   Left Foot:   Protective Sensation: 4 sites tested. 3 sites sensed.   Skin  Integrity: Positive for ulcer, skin breakdown, erythema and callus.   Neurological: She is alert.   Skin: Skin is warm. Capillary refill takes more than 3 seconds. There is erythema.   Psychiatric: She has a normal mood and affect. Her behavior is normal. Judgment and thought content normal.   Nursing note and vitals reviewed.      Assessment:       1. Type 1 diabetes mellitus with diabetic neuropathy    2. Skin ulcer of left foot with fat layer exposed        Plan:       Patient presents today she has an ulcer on the bottom of her left great toe this occurred after Dr. Clemons cut the tendon on the bottom of her left great toe to prevent an ulcer that she previously had from forming again.  Patient had surgery by Dr. Clemons in November of 2018 she states this has been open draining and has never completely healed ever since that time.  Patient is a type 1 diabetic times 20 years she relates that she has had neuropathy for the past 10.  The ulceration on the plantar aspect of the patient's left hallux was approximately 1.5 cm in diameter this was sharply excisionally debrided as documented.  Culture and sensitivity was performed on the area patient will be placed on appropriate antibiotics pending culture and sensitivity I have ordered plain film x-rays of the area advised the patient I am concerned about the possibility of osteomyelitis because of the chronic nature of this ulceration.  Subsequent x-rays were evaluated there were no obvious signs of bony erosion or periosteal reaction to indicate osteomyelitis however an MRI may be necessary down the road if the patient continues take have this nonhealing ulceration.  Patient will clean the area with Dakin solution daily and apply well-padded dry dressing and she is going to discontinue all the creams and lotions she was applying I have also advised her she cannot get the area wet she is going to use Vicks vapor rub on her left big toenail because of fungal  involvement in the nail this nail may require aggressive debridement and follow-up depending upon how it looks.  Complete diabetic evaluation was performed today total face-to-face time including discussion evaluation treatment and wound care =45min.  Plan follow-up will be 1 week patient advised we may have to be aggressive to offload pressure from this area in order to get this healed properly.  X-rays will be reviewed with the patient and her follow-up I am of subsequently gotten the culture and sensitivity results which were positive for Pseudomonas I am starting the patient on Cipro she has been contacted and advised to start taking this immediately.

## 2019-04-21 NOTE — PROCEDURES
"Wound Debridement  Date/Time: 4/16/2019 7:25 PM  Performed by: Osvaldo Arreola DPM  Authorized by: Osvaldo Arreola DPM     Time out: Immediately prior to procedure a "time out" was called to verify the correct patient, procedure, equipment, support staff and site/side marked as required.    Consent Done?:  Yes (Verbal)    Preparation: Patient was prepped and draped in usual sterile fashion    Local anesthesia used?: No      Wound Details:    Location:  Left foot    Location:  Left 1st Toe    Type of Debridement:  Excisional       Length (cm):  1.5       Area (sq cm):  2.25       Width (cm):  1.5       Percent Debrided (%):  100       Depth (cm):  0.4       Total Area Debrided (sq cm):  2.25    Depth of debridement:  Subcutaneous tissue    Tissue debrided:  Epidermis    Devitalized tissue debrided:  Slough and Callus    Instruments:  Blade    Bleeding:  None  Patient tolerance:  Patient tolerated the procedure well with no immediate complications      "

## 2019-04-22 ENCOUNTER — TELEPHONE (OUTPATIENT)
Dept: PODIATRY | Facility: CLINIC | Age: 69
End: 2019-04-22

## 2019-04-22 NOTE — TELEPHONE ENCOUNTER
----- Message from Osvaldo Arreola DPM sent at 4/20/2019  7:28 PM CDT -----  Please call the patient regarding her abnormal result.Advise C&S + pseudomonas she is to start Cipro as rx.  RX sent.

## 2019-04-23 ENCOUNTER — OFFICE VISIT (OUTPATIENT)
Dept: PODIATRY | Facility: CLINIC | Age: 69
End: 2019-04-23
Payer: MEDICARE

## 2019-04-23 VITALS
BODY MASS INDEX: 41.83 KG/M2 | DIASTOLIC BLOOD PRESSURE: 78 MMHG | HEIGHT: 64 IN | RESPIRATION RATE: 20 BRPM | SYSTOLIC BLOOD PRESSURE: 132 MMHG | WEIGHT: 245 LBS | HEART RATE: 77 BPM

## 2019-04-23 DIAGNOSIS — L97.522 SKIN ULCER OF LEFT FOOT WITH FAT LAYER EXPOSED: ICD-10-CM

## 2019-04-23 DIAGNOSIS — E10.40 TYPE 1 DIABETES MELLITUS WITH DIABETIC NEUROPATHY: Primary | ICD-10-CM

## 2019-04-23 PROCEDURE — 99999 PR PBB SHADOW E&M-EST. PATIENT-LVL III: CPT | Mod: PBBFAC,,, | Performed by: PODIATRIST

## 2019-04-23 PROCEDURE — 11042 DBRDMT SUBQ TIS 1ST 20SQCM/<: CPT | Mod: S$GLB,,, | Performed by: PODIATRIST

## 2019-04-23 PROCEDURE — 11042 WOUND DEBRIDEMENT: ICD-10-PCS | Mod: S$GLB,,, | Performed by: PODIATRIST

## 2019-04-23 PROCEDURE — 3078F DIAST BP <80 MM HG: CPT | Mod: CPTII,S$GLB,, | Performed by: PODIATRIST

## 2019-04-23 PROCEDURE — 1101F PR PT FALLS ASSESS DOC 0-1 FALLS W/OUT INJ PAST YR: ICD-10-PCS | Mod: CPTII,S$GLB,, | Performed by: PODIATRIST

## 2019-04-23 PROCEDURE — 3044F PR MOST RECENT HEMOGLOBIN A1C LEVEL <7.0%: ICD-10-PCS | Mod: CPTII,S$GLB,, | Performed by: PODIATRIST

## 2019-04-23 PROCEDURE — 3075F PR MOST RECENT SYSTOLIC BLOOD PRESS GE 130-139MM HG: ICD-10-PCS | Mod: CPTII,S$GLB,, | Performed by: PODIATRIST

## 2019-04-23 PROCEDURE — 3078F PR MOST RECENT DIASTOLIC BLOOD PRESSURE < 80 MM HG: ICD-10-PCS | Mod: CPTII,S$GLB,, | Performed by: PODIATRIST

## 2019-04-23 PROCEDURE — 3044F HG A1C LEVEL LT 7.0%: CPT | Mod: CPTII,S$GLB,, | Performed by: PODIATRIST

## 2019-04-23 PROCEDURE — 3075F SYST BP GE 130 - 139MM HG: CPT | Mod: CPTII,S$GLB,, | Performed by: PODIATRIST

## 2019-04-23 PROCEDURE — 99999 PR PBB SHADOW E&M-EST. PATIENT-LVL III: ICD-10-PCS | Mod: PBBFAC,,, | Performed by: PODIATRIST

## 2019-04-23 PROCEDURE — 1101F PT FALLS ASSESS-DOCD LE1/YR: CPT | Mod: CPTII,S$GLB,, | Performed by: PODIATRIST

## 2019-04-23 PROCEDURE — 99213 OFFICE O/P EST LOW 20 MIN: CPT | Mod: 25,S$GLB,, | Performed by: PODIATRIST

## 2019-04-23 PROCEDURE — 99213 PR OFFICE/OUTPT VISIT, EST, LEVL III, 20-29 MIN: ICD-10-PCS | Mod: 25,S$GLB,, | Performed by: PODIATRIST

## 2019-04-27 NOTE — PROCEDURES
"Wound Debridement  Date/Time: 4/23/2019 1:47 PM  Performed by: Osvaldo Arreola DPM  Authorized by: Osvaldo Arreola DPM     Time out: Immediately prior to procedure a "time out" was called to verify the correct patient, procedure, equipment, support staff and site/side marked as required.    Consent Done?:  Yes (Verbal)    Preparation: Patient was prepped and draped in usual sterile fashion    Local anesthesia used?: No      Wound Details:    Location:  Left foot    Location:  Left 1st Toe       Length (cm):  1.5       Area (sq cm):  1.5       Width (cm):  1       Percent Debrided (%):  100       Depth (cm):  0.3       Total Area Debrided (sq cm):  1.5    Depth of debridement:  Subcutaneous tissue    Tissue debrided:  Epidermis    Devitalized tissue debrided:  Callus, Necrotic/Eschar and Slough    Instruments:  Blade    Bleeding:  None  Patient tolerance:  Patient tolerated the procedure well with no immediate complications      "

## 2019-04-27 NOTE — PROGRESS NOTES
Subjective:       Patient ID: Marifer Bernardo is a 69 y.o. female.    Chief Complaint: Follow-up    HPI patient presents today she has been experiencing swelling in her left foot she also has a chronic ulceration on the bottom of her left great toe patient relates that she had been seeing Dr. Nelson before he retired for a callus on her left big toe she states she subsequently saw Dr. Clemons in in November of 2018 Dr. Clemons did surgery to cut the tendon on the bottom of her left big toe ever since that time she has had a wound in this area that has drained from time to time and has never completely healed.  Patient had a hip replacement in January right side.  Patient is a type 1 diabetic times 20 years.  Patient states the ulceration that was initially on the bottom of her left great toe the doctor Omar had started out treating subsequently healed it was after it healed that Dr. Clemons did surgery to prevent it from coming back which is now led to the ulceration that she presents with today.  Patient has a bag of all different types of ointment that she has been applying to the area from time to.  Review of Systems   Musculoskeletal: Positive for arthralgias, gait problem and joint swelling.   Neurological: Positive for numbness.   All other systems reviewed and are negative.      Objective:      Physical Exam   Constitutional: She appears well-developed and well-nourished.   Cardiovascular:   Pulses:       Dorsalis pedis pulses are 1+ on the right side, and 1+ on the left side.        Posterior tibial pulses are 1+ on the right side, and 1+ on the left side.   Pulmonary/Chest: Effort normal.   Musculoskeletal: Normal range of motion. She exhibits edema, tenderness and deformity.        Left foot: There is deformity.   Feet:   Right Foot:   Protective Sensation: 4 sites tested. 3 sites sensed.   Left Foot:   Protective Sensation: 4 sites tested. 3 sites sensed.   Skin Integrity: Positive for ulcer, skin breakdown,  erythema and callus.   Neurological: She is alert.   Skin: Skin is warm. Capillary refill takes more than 3 seconds. There is erythema.   Psychiatric: She has a normal mood and affect. Her behavior is normal. Judgment and thought content normal.   Nursing note and vitals reviewed.              Assessment:       1. Type 1 diabetes mellitus with diabetic neuropathy    2. Skin ulcer of left foot with fat layer exposed        Plan:       Patient presents today for follow-up of an ulceration on the plantar surface of the patient's left hallux patient relates she has not started her Cipro yet as the pharmacy had not gotten there ship been in but she plans to go pick it up later today in started as directed per her culture and sensitivity.  Patient was advised the wound itself looks considerably better since I saw her last week it is still approximately 1.5 cm long by 1 cm wide however it is not as deep as it had been it has filled in some it is more of a superficial ulcer at this time I have advised the patient we need to make sure we get the infection completely resolved before we focus on the healing part of this treatment. I will consider meta honey verses Promogran over the area but in the meantime the patient will continue to clean the area every day with Dakin solution and keep a light dressing over the area with 2 by to use she is also to keep the area dry plan to follow up with her in 2 weeks if there is any changes to the area she is to contact me immediately.  Excisional debridement was performed today as documented.

## 2019-05-07 ENCOUNTER — OFFICE VISIT (OUTPATIENT)
Dept: PODIATRY | Facility: CLINIC | Age: 69
End: 2019-05-07
Payer: MEDICARE

## 2019-05-07 VITALS
SYSTOLIC BLOOD PRESSURE: 154 MMHG | HEART RATE: 71 BPM | BODY MASS INDEX: 43.26 KG/M2 | WEIGHT: 252 LBS | DIASTOLIC BLOOD PRESSURE: 57 MMHG | TEMPERATURE: 99 F

## 2019-05-07 DIAGNOSIS — L97.522 SKIN ULCER OF LEFT FOOT WITH FAT LAYER EXPOSED: ICD-10-CM

## 2019-05-07 DIAGNOSIS — E10.40 TYPE 1 DIABETES MELLITUS WITH DIABETIC NEUROPATHY: Primary | ICD-10-CM

## 2019-05-07 PROCEDURE — 99999 PR PBB SHADOW E&M-EST. PATIENT-LVL III: ICD-10-PCS | Mod: PBBFAC,,, | Performed by: PODIATRIST

## 2019-05-07 PROCEDURE — 3078F PR MOST RECENT DIASTOLIC BLOOD PRESSURE < 80 MM HG: ICD-10-PCS | Mod: CPTII,S$GLB,, | Performed by: PODIATRIST

## 2019-05-07 PROCEDURE — 99999 PR PBB SHADOW E&M-EST. PATIENT-LVL III: CPT | Mod: PBBFAC,,, | Performed by: PODIATRIST

## 2019-05-07 PROCEDURE — 1101F PT FALLS ASSESS-DOCD LE1/YR: CPT | Mod: CPTII,S$GLB,, | Performed by: PODIATRIST

## 2019-05-07 PROCEDURE — 3077F SYST BP >= 140 MM HG: CPT | Mod: CPTII,S$GLB,, | Performed by: PODIATRIST

## 2019-05-07 PROCEDURE — 3077F PR MOST RECENT SYSTOLIC BLOOD PRESSURE >= 140 MM HG: ICD-10-PCS | Mod: CPTII,S$GLB,, | Performed by: PODIATRIST

## 2019-05-07 PROCEDURE — 99213 OFFICE O/P EST LOW 20 MIN: CPT | Mod: 25,S$GLB,, | Performed by: PODIATRIST

## 2019-05-07 PROCEDURE — 1101F PR PT FALLS ASSESS DOC 0-1 FALLS W/OUT INJ PAST YR: ICD-10-PCS | Mod: CPTII,S$GLB,, | Performed by: PODIATRIST

## 2019-05-07 PROCEDURE — 99213 PR OFFICE/OUTPT VISIT, EST, LEVL III, 20-29 MIN: ICD-10-PCS | Mod: 25,S$GLB,, | Performed by: PODIATRIST

## 2019-05-07 PROCEDURE — 11042 WOUND DEBRIDEMENT: ICD-10-PCS | Mod: S$GLB,,, | Performed by: PODIATRIST

## 2019-05-07 PROCEDURE — 3044F HG A1C LEVEL LT 7.0%: CPT | Mod: CPTII,S$GLB,, | Performed by: PODIATRIST

## 2019-05-07 PROCEDURE — 3078F DIAST BP <80 MM HG: CPT | Mod: CPTII,S$GLB,, | Performed by: PODIATRIST

## 2019-05-07 PROCEDURE — 3044F PR MOST RECENT HEMOGLOBIN A1C LEVEL <7.0%: ICD-10-PCS | Mod: CPTII,S$GLB,, | Performed by: PODIATRIST

## 2019-05-07 PROCEDURE — 11042 DBRDMT SUBQ TIS 1ST 20SQCM/<: CPT | Mod: S$GLB,,, | Performed by: PODIATRIST

## 2019-05-07 NOTE — LETTER
May 11, 2019      Jose Mary MD  [address] [facility] [place] [place] [address]           [facility]  [place] [facility]  [address] [place] [address]  Phone: [phone]  Fax: [phone]          Patient: Marifer Bernardo   MR Number: [id number]   YOB: 1950   Date of Visit: 5/7/2019       Dear Dr. Jose Mary:    Thank you for referring Marifer Bernardo to me for evaluation. Attached you will find relevant portions of my assessment and plan of care.    If you have questions, please do not hesitate to call me. I look forward to following Marifer Bernardo along with you.    Sincerely,    Osvaldo Arreola, DPRICK    Enclosure  CC:  No Recipients    If you would like to receive this communication electronically, please contact [email or web address] or [phone] to request more information on [employer] access.    For providers and/or their staff who would like to refer a patient to [facility], please contact us through our one-stop-shop provider referral line, [facility], at [phone].    If you feel you have received this communication in error or would no longer like to receive these types of communications, please e-mail [email or web address]

## 2019-05-12 NOTE — PROGRESS NOTES
Subjective:       Patient ID: Marifer Bernardo is a 69 y.o. female.    Chief Complaint: Follow-up; Foot Problem; Diabetes Mellitus; and Foot Ulcer    Foot Ulcer   Associated symptoms include arthralgias, joint swelling and numbness.    patient presents today she has been experiencing swelling in her left foot she also has a chronic ulceration on the bottom of her left great toe patient relates that she had been seeing Dr. Nelson before he retired for a callus on her left big toe she states she subsequently saw Dr. Celmons in in November of 2018 Dr. Clemons did surgery to cut the tendon on the bottom of her left big toe ever since that time she has had a wound in this area that has drained from time to time and has never completely healed.  Patient had a hip replacement in January right side.  Patient is a type 1 diabetic times 20 years.  Patient states the ulceration that was initially on the bottom of her left great toe the doctor Omar had started out treating subsequently healed it was after it healed that Dr. Clemons did surgery to prevent it from coming back which is now led to the ulceration that she presents with today.  Patient has a bag of all different types of ointment that she has been applying to the area from time to.  Review of Systems   Musculoskeletal: Positive for arthralgias, gait problem and joint swelling.   Neurological: Positive for numbness.   All other systems reviewed and are negative.      Objective:      Physical Exam   Constitutional: She appears well-developed and well-nourished.   Cardiovascular:   Pulses:       Dorsalis pedis pulses are 1+ on the right side, and 1+ on the left side.        Posterior tibial pulses are 1+ on the right side, and 1+ on the left side.   Pulmonary/Chest: Effort normal.   Musculoskeletal: Normal range of motion. She exhibits edema, tenderness and deformity.        Left foot: There is deformity.   Feet:   Right Foot:   Protective Sensation: 4 sites tested. 3 sites  sensed.   Left Foot:   Protective Sensation: 4 sites tested. 3 sites sensed.   Skin Integrity: Positive for ulcer, skin breakdown, erythema and callus.   Neurological: She is alert.   Skin: Skin is warm. Capillary refill takes more than 3 seconds. There is erythema.   Psychiatric: She has a normal mood and affect. Her behavior is normal. Judgment and thought content normal.   Nursing note and vitals reviewed.              Assessment:       1. Type 1 diabetes mellitus with diabetic neuropathy    2. Skin ulcer of left foot with fat layer exposed        Plan:       Patient presents today for follow-up of an ulceration on the plantar surface of the patient's left hallux patient relates she has not started her Cipro yet as the pharmacy had not gotten there ship been in but she plans to go pick it up later today in started as directed per her culture and sensitivity.  Patient was advised the wound itself looks considerably better since I saw her last week it is still approximately 1.0 cm long by 0.5 cm wide however it is not as deep as it had been it has filled in some it is more of a superficial ulcer at this time I have advised the patient we need to make sure we get the infection completely resolved before we focus on the healing part of this treatment. I will consider meta honey verses Promogran over the area but in the meantime the patient will continue to clean the area every day with Dakin solution and keep a light dressing over the area with 2 by to use she is also to keep the area dry plan to follow up with her in 2 weeks if there is any changes to the area she is to contact me immediately.  Excisional debridement was performed today as documented.  Patient was dispensed a meta honey alginate I am going to have her apply this every 48 hr over the ulcer area to given a protective layer and to facilitate further healing as this has continue to improve.  Patient will be seen for follow-up in 2-3 weeks.  Patient  advised once we get this healed were going to have to focus on keeping this area healed and keeping pressure off of the area.  Total face-to-face time equaled 20 min in addition to excisional debridement as documented.  Digital pictures not available today due to computer error.  Complete diabetic evaluation performed.

## 2019-05-12 NOTE — PROCEDURES
"Wound Debridement  Date/Time: 5/7/2019 8:52 PM  Performed by: Osvaldo Arreola DPM  Authorized by: Osvaldo Arreola DPM     Time out: Immediately prior to procedure a "time out" was called to verify the correct patient, procedure, equipment, support staff and site/side marked as required.    Consent Done?:  Yes (Verbal)    Preparation: Patient was prepped and draped in usual sterile fashion    Local anesthesia used?: No      Wound Details:    Location:  Left foot    Location:  Left 1st Toe       Length (cm):  1       Area (sq cm):  0.5       Width (cm):  0.5       Percent Debrided (%):  100       Depth (cm):  0.2       Total Area Debrided (sq cm):  0.5    Depth of debridement:  Subcutaneous tissue    Tissue debrided:  Hypergranulation    Devitalized tissue debrided:  Callus and Slough    Instruments:  Blade    Bleeding:  None  Patient tolerance:  Patient tolerated the procedure well with no immediate complications      "

## 2019-06-04 ENCOUNTER — OFFICE VISIT (OUTPATIENT)
Dept: PODIATRY | Facility: CLINIC | Age: 69
End: 2019-06-04
Payer: MEDICARE

## 2019-06-04 VITALS
RESPIRATION RATE: 18 BRPM | TEMPERATURE: 98 F | DIASTOLIC BLOOD PRESSURE: 63 MMHG | SYSTOLIC BLOOD PRESSURE: 146 MMHG | HEIGHT: 64 IN | BODY MASS INDEX: 43.02 KG/M2 | WEIGHT: 252 LBS | HEART RATE: 77 BPM | OXYGEN SATURATION: 98 %

## 2019-06-04 DIAGNOSIS — E10.40 TYPE 1 DIABETES MELLITUS WITH DIABETIC NEUROPATHY: Primary | ICD-10-CM

## 2019-06-04 DIAGNOSIS — L97.522 SKIN ULCER OF LEFT FOOT WITH FAT LAYER EXPOSED: ICD-10-CM

## 2019-06-04 PROCEDURE — 3078F DIAST BP <80 MM HG: CPT | Mod: CPTII,S$GLB,, | Performed by: PODIATRIST

## 2019-06-04 PROCEDURE — 99999 PR PBB SHADOW E&M-EST. PATIENT-LVL III: ICD-10-PCS | Mod: PBBFAC,,, | Performed by: PODIATRIST

## 2019-06-04 PROCEDURE — 3078F PR MOST RECENT DIASTOLIC BLOOD PRESSURE < 80 MM HG: ICD-10-PCS | Mod: CPTII,S$GLB,, | Performed by: PODIATRIST

## 2019-06-04 PROCEDURE — 3077F SYST BP >= 140 MM HG: CPT | Mod: CPTII,S$GLB,, | Performed by: PODIATRIST

## 2019-06-04 PROCEDURE — 3044F HG A1C LEVEL LT 7.0%: CPT | Mod: CPTII,S$GLB,, | Performed by: PODIATRIST

## 2019-06-04 PROCEDURE — 1101F PT FALLS ASSESS-DOCD LE1/YR: CPT | Mod: CPTII,S$GLB,, | Performed by: PODIATRIST

## 2019-06-04 PROCEDURE — 99999 PR PBB SHADOW E&M-EST. PATIENT-LVL III: CPT | Mod: PBBFAC,,, | Performed by: PODIATRIST

## 2019-06-04 PROCEDURE — 97597 DBRDMT OPN WND 1ST 20 CM/<: CPT | Mod: S$GLB,,, | Performed by: PODIATRIST

## 2019-06-04 PROCEDURE — 99213 PR OFFICE/OUTPT VISIT, EST, LEVL III, 20-29 MIN: ICD-10-PCS | Mod: 25,S$GLB,, | Performed by: PODIATRIST

## 2019-06-04 PROCEDURE — 1101F PR PT FALLS ASSESS DOC 0-1 FALLS W/OUT INJ PAST YR: ICD-10-PCS | Mod: CPTII,S$GLB,, | Performed by: PODIATRIST

## 2019-06-04 PROCEDURE — 99213 OFFICE O/P EST LOW 20 MIN: CPT | Mod: 25,S$GLB,, | Performed by: PODIATRIST

## 2019-06-04 PROCEDURE — 97597 WOUND DEBRIDEMENT: ICD-10-PCS | Mod: S$GLB,,, | Performed by: PODIATRIST

## 2019-06-04 PROCEDURE — 3077F PR MOST RECENT SYSTOLIC BLOOD PRESSURE >= 140 MM HG: ICD-10-PCS | Mod: CPTII,S$GLB,, | Performed by: PODIATRIST

## 2019-06-04 PROCEDURE — 3044F PR MOST RECENT HEMOGLOBIN A1C LEVEL <7.0%: ICD-10-PCS | Mod: CPTII,S$GLB,, | Performed by: PODIATRIST

## 2019-06-04 NOTE — LETTER
June 7, 2019      Jose Mary MD  [address] [place] [place] [place] [address]           [facility]  [place] - [facility]iatry/Wound Care  [address] [place] [address]  Phone: [phone]  Fax: [phone]          Patient: Marifer Bernardo   MR Number: [id number]   YOB: 1950   Date of Visit: 6/4/2019       Dear Dr. Jose Mary:    Thank you for referring Marifer Bernardo to me for evaluation. Attached you will find relevant portions of my assessment and plan of care.    If you have questions, please do not hesitate to call me. I look forward to following Marifer Bernardo along with you.    Sincerely,    Leila Eubanks    Enclosure  CC:  No Recipients    If you would like to receive this communication electronically, please contact [email or web address] or [phone] to request more information on [employer] Link access.    For providers and/or their staff who would like to refer a patient to [facility], please contact us through our one-stop-shop provider referral line, [facility], at [phone].    If you feel you have received this communication in error or would no longer like to receive these types of communications, please e-mail [email or web address]

## 2019-06-09 NOTE — PROCEDURES
"Wound Debridement  Date/Time: 6/4/2019 8:29 PM  Performed by: Osvaldo Arreola DPM  Authorized by: Osvaldo Arreola DPM     Time out: Immediately prior to procedure a "time out" was called to verify the correct patient, procedure, equipment, support staff and site/side marked as required.    Consent Done?:  Yes (Verbal)    Preparation: Patient was prepped and draped in usual sterile fashion    Local anesthesia used?: No      Wound Details:    Location:  Left foot    Location:  Left 1st Toe       Length (cm):  1       Area (sq cm):  0.5       Width (cm):  0.5       Percent Debrided (%):  100       Depth (cm):  0.2       Total Area Debrided (sq cm):  0.5    Depth of debridement:  Epidermis/Dermis    Tissue debrided:  Dermis    Devitalized tissue debrided:  Callus and Slough    Instruments:  Blade    Bleeding:  None  Patient tolerance:  Patient tolerated the procedure well with no immediate complications      "

## 2019-06-09 NOTE — PROGRESS NOTES
Subjective:       Patient ID: Marifer Bernardo is a 69 y.o. female.    Chief Complaint: Follow-up; Foot Ulcer; and Diabetes Mellitus    Foot Ulcer   Associated symptoms include arthralgias, joint swelling and numbness.    patient presents today she has been experiencing swelling in her left foot she also has a chronic ulceration on the bottom of her left great toe patient relates that she had been seeing Dr. Nelson before he retired for a callus on her left big toe she states she subsequently saw Dr. Clemons in in November of 2018 Dr. Clemons did surgery to cut the tendon on the bottom of her left big toe ever since that time she has had a wound in this area that has drained from time to time and has never completely healed.  Patient had a hip replacement in January right side.  Patient is a type 1 diabetic times 20 years.  Patient states the ulceration that was initially on the bottom of her left great toe the doctor Omar had started out treating subsequently healed it was after it healed that Dr. Clemons did surgery to prevent it from coming back which is now led to the ulceration that she presents with today.  Patient has a bag of all different types of ointment that she has been applying to the area from time to.  Review of Systems   Musculoskeletal: Positive for arthralgias, gait problem and joint swelling.   Neurological: Positive for numbness.   All other systems reviewed and are negative.      Objective:      Physical Exam   Constitutional: She appears well-developed and well-nourished.   Cardiovascular:   Pulses:       Dorsalis pedis pulses are 1+ on the right side, and 1+ on the left side.        Posterior tibial pulses are 1+ on the right side, and 1+ on the left side.   Pulmonary/Chest: Effort normal.   Musculoskeletal: Normal range of motion. She exhibits edema, tenderness and deformity.        Left foot: There is deformity.   Feet:   Right Foot:   Protective Sensation: 4 sites tested. 3 sites sensed.   Left  Foot:   Protective Sensation: 4 sites tested. 3 sites sensed.   Skin Integrity: Positive for ulcer, skin breakdown, erythema and callus.   Neurological: She is alert.   Skin: Skin is warm. Capillary refill takes more than 3 seconds. There is erythema.   Psychiatric: She has a normal mood and affect. Her behavior is normal. Judgment and thought content normal.   Nursing note and vitals reviewed.                      Assessment:       1. Type 1 diabetes mellitus with diabetic neuropathy    2. Skin ulcer of left foot with fat layer exposed        Plan:       Patient presents today for follow-up of an ulceration on the plantar surface of the patient's left hallux patient relates she has not started her Cipro yet as the pharmacy had not gotten there ship been in but she plans to go pick it up later today in started as directed per her culture and sensitivity.  Patient was advised the wound itself looks considerably better since I saw her last week it is still approximately 1.0 cm long by 0.5 cm wide however it is not as deep as it had been it has filled in some it is more of a superficial ulcer at this time I have advised the patient we need to make sure we get the infection completely resolved before we focus on the healing part of this treatment. I will consider meta honey verses Promogran over the area but in the meantime the patient will continue to clean the area every day with Dakin solution and keep a light dressing over the area with 2 by to use she is also to keep the area dry plan to follow up with her in 2 weeks if there is any changes to the area she is to contact me immediately.  Excisional debridement was performed today as documented.  Patient was dispensed a meta honey alginate I am going to have her apply this every 48 hr over the ulcer area to given a protective layer and to facilitate further healing as this has continue to improve.  Patient will be seen for follow-up in 2-3 weeks.  Patient advised  once we get this healed were going to have to focus on keeping this area healed and keeping pressure off of the area.  Total face-to-face time equaled 20 min in addition to excisional debridement as documented.  We will continue current treatment plan as discussed with the Med[employer] every 48 hr patient is leaving to go on a trip obviously she needs to keep this area clean dry and continue her dressings.  Follow-up 2 weeks.  Complete diabetic evaluation performed.

## 2019-06-18 ENCOUNTER — OFFICE VISIT (OUTPATIENT)
Dept: PODIATRY | Facility: CLINIC | Age: 69
End: 2019-06-18
Payer: MEDICARE

## 2019-06-18 VITALS
WEIGHT: 252 LBS | TEMPERATURE: 98 F | HEIGHT: 64 IN | OXYGEN SATURATION: 98 % | HEART RATE: 81 BPM | BODY MASS INDEX: 43.02 KG/M2 | DIASTOLIC BLOOD PRESSURE: 50 MMHG | SYSTOLIC BLOOD PRESSURE: 123 MMHG

## 2019-06-18 DIAGNOSIS — E10.40 TYPE 1 DIABETES MELLITUS WITH DIABETIC NEUROPATHY: Primary | ICD-10-CM

## 2019-06-18 DIAGNOSIS — M19.079 OSTEOARTHRITIS OF ANKLE AND FOOT, UNSPECIFIED LATERALITY: ICD-10-CM

## 2019-06-18 DIAGNOSIS — L97.522 SKIN ULCER OF LEFT FOOT WITH FAT LAYER EXPOSED: ICD-10-CM

## 2019-06-18 PROCEDURE — 3074F PR MOST RECENT SYSTOLIC BLOOD PRESSURE < 130 MM HG: ICD-10-PCS | Mod: CPTII,S$GLB,, | Performed by: PODIATRIST

## 2019-06-18 PROCEDURE — 3044F PR MOST RECENT HEMOGLOBIN A1C LEVEL <7.0%: ICD-10-PCS | Mod: CPTII,S$GLB,, | Performed by: PODIATRIST

## 2019-06-18 PROCEDURE — 1101F PT FALLS ASSESS-DOCD LE1/YR: CPT | Mod: CPTII,S$GLB,, | Performed by: PODIATRIST

## 2019-06-18 PROCEDURE — 3074F SYST BP LT 130 MM HG: CPT | Mod: CPTII,S$GLB,, | Performed by: PODIATRIST

## 2019-06-18 PROCEDURE — 99214 OFFICE O/P EST MOD 30 MIN: CPT | Mod: S$GLB,,, | Performed by: PODIATRIST

## 2019-06-18 PROCEDURE — 3044F HG A1C LEVEL LT 7.0%: CPT | Mod: CPTII,S$GLB,, | Performed by: PODIATRIST

## 2019-06-18 PROCEDURE — 3078F PR MOST RECENT DIASTOLIC BLOOD PRESSURE < 80 MM HG: ICD-10-PCS | Mod: CPTII,S$GLB,, | Performed by: PODIATRIST

## 2019-06-18 PROCEDURE — 99999 PR PBB SHADOW E&M-EST. PATIENT-LVL III: CPT | Mod: PBBFAC,,, | Performed by: PODIATRIST

## 2019-06-18 PROCEDURE — 3078F DIAST BP <80 MM HG: CPT | Mod: CPTII,S$GLB,, | Performed by: PODIATRIST

## 2019-06-18 PROCEDURE — 99214 PR OFFICE/OUTPT VISIT, EST, LEVL IV, 30-39 MIN: ICD-10-PCS | Mod: S$GLB,,, | Performed by: PODIATRIST

## 2019-06-18 PROCEDURE — 1101F PR PT FALLS ASSESS DOC 0-1 FALLS W/OUT INJ PAST YR: ICD-10-PCS | Mod: CPTII,S$GLB,, | Performed by: PODIATRIST

## 2019-06-18 PROCEDURE — 99999 PR PBB SHADOW E&M-EST. PATIENT-LVL III: ICD-10-PCS | Mod: PBBFAC,,, | Performed by: PODIATRIST

## 2019-06-18 RX ORDER — MECLIZINE HYDROCHLORIDE 25 MG/1
TABLET ORAL
COMMUNITY
End: 2022-10-24

## 2019-06-18 NOTE — LETTER
June 22, 2019      Jose Mary MD  [address] [place] [facility] [place] [place] [address]           [facility]  [place] - [facility]iatry/Wound Care  [address] [place] [address]  Phone: [phone]  Fax: [phone]          Patient: Marifer Bernardo   MR Number: [id number]   YOB: 1950   Date of Visit: 6/18/2019       Dear Dr. Jose Mary:    Thank you for referring Marifer Bernardo to me for evaluation. Attached you will find relevant portions of my assessment and plan of care.    If you have questions, please do not hesitate to call me. I look forward to following Marifer Bernardo along with you.    Sincerely,    Osvaldo Arreola, DPRICK    Enclosure  CC:  No Recipients    If you would like to receive this communication electronically, please contact [email or web address] or [phone] to request more information on [email or web address] Link access.    For providers and/or their staff who would like to refer a patient to [facility], please contact us through our one-stop-shop provider referral line, [facility], at [phone].    If you feel you have received this communication in error or would no longer like to receive these types of communications, please e-mail [email or web address]

## 2019-06-22 PROBLEM — M19.079 OSTEOARTHRITIS OF ANKLE AND FOOT: Status: ACTIVE | Noted: 2019-06-22

## 2019-06-23 NOTE — PROGRESS NOTES
Subjective:       Patient ID: Marifer Bernardo is a 69 y.o. female.    Chief Complaint: Follow-up; Foot Ulcer; Diabetes Mellitus; and Foot Problem    Foot Ulcer   Associated symptoms include arthralgias, joint swelling and numbness.    patient presents today she has been experiencing swelling in her left foot she also has a chronic ulceration on the bottom of her left great toe patient relates that she had been seeing Dr. Nelson before he retired for a callus on her left big toe she states she subsequently saw Dr. Clemons in in November of 2018 Dr. Clemons did surgery to cut the tendon on the bottom of her left big toe ever since that time she has had a wound in this area that has drained from time to time and has never completely healed.  Patient had a hip replacement in January right side.  Patient is a type 1 diabetic times 20 years.  Patient states the ulceration that was initially on the bottom of her left great toe the doctor Omar had started out treating subsequently healed it was after it healed that Dr. Clemons did surgery to prevent it from coming back which is now led to the ulceration that she presents with today.  Patient has a bag of all different types of ointment that she has been applying to the area from time to.  Review of Systems   Musculoskeletal: Positive for arthralgias, gait problem and joint swelling.   Neurological: Positive for numbness.   All other systems reviewed and are negative.      Objective:      Physical Exam   Constitutional: She appears well-developed and well-nourished.   Cardiovascular:   Pulses:       Dorsalis pedis pulses are 1+ on the right side, and 1+ on the left side.        Posterior tibial pulses are 1+ on the right side, and 1+ on the left side.   Pulmonary/Chest: Effort normal.   Musculoskeletal: Normal range of motion. She exhibits edema, tenderness and deformity.        Left foot: There is deformity.   Feet:   Right Foot:   Protective Sensation: 4 sites tested. 3 sites  sensed.   Left Foot:   Protective Sensation: 4 sites tested. 3 sites sensed.   Skin Integrity: Positive for ulcer, skin breakdown, erythema and callus.   Neurological: She is alert.   Skin: Skin is warm. Capillary refill takes more than 3 seconds. There is erythema.   Psychiatric: She has a normal mood and affect. Her behavior is normal. Judgment and thought content normal.   Nursing note and vitals reviewed.                      Assessment:       1. Type 1 diabetes mellitus with diabetic neuropathy    2. Skin ulcer of left foot with fat layer exposed    3. Osteoarthritis of ankle and foot, unspecified laterality        Plan:       Patient presents today for follow-up of an ulceration on the plantar surface of the patient's left hallux patient relates she has not started her Cipro yet as the pharmacy had not gotten there ship been in but she plans to go pick it up later today in started as directed per her culture and sensitivity.  Patient was advised the wound itself looks considerably better since I saw her last week it is still approximately 1.0 cm long by 0.5 cm wide however it is not as deep as it had been it has filled in some it is more of a superficial ulcer at this time I have advised the patient we need to make sure we get the infection completely resolved before we focus on the healing part of this treatment. Following non excisional debridement patient advised the area continues to look very good there is just a linear opening this still remains we do need to offload the pressure area better some going to dispense the patient some horseshoe pads she is going to clean the area at each time of the dressing change every 48 hr apply a horseshoe pad and a small amount of meta honey with Coban overlying this area so that should get the proper moisture she needs for healing but will be offloading pressure from this area this is to be done every day and will see how she is responding to this at her follow-up.   Total face-to-face time including discussion evaluation non excisional debridement and wound care equaled 25 min.  Follow-up 2 weeks.  Complete diabetic evaluation performed.

## 2019-07-02 ENCOUNTER — OFFICE VISIT (OUTPATIENT)
Dept: PODIATRY | Facility: CLINIC | Age: 69
End: 2019-07-02
Payer: MEDICARE

## 2019-07-02 VITALS
DIASTOLIC BLOOD PRESSURE: 58 MMHG | HEART RATE: 83 BPM | SYSTOLIC BLOOD PRESSURE: 141 MMHG | TEMPERATURE: 98 F | WEIGHT: 245 LBS | BODY MASS INDEX: 41.83 KG/M2 | HEIGHT: 64 IN

## 2019-07-02 DIAGNOSIS — M19.079 OSTEOARTHRITIS OF ANKLE AND FOOT, UNSPECIFIED LATERALITY: ICD-10-CM

## 2019-07-02 DIAGNOSIS — E10.40 TYPE 1 DIABETES MELLITUS WITH DIABETIC NEUROPATHY: Primary | ICD-10-CM

## 2019-07-02 DIAGNOSIS — L97.522 SKIN ULCER OF LEFT FOOT WITH FAT LAYER EXPOSED: ICD-10-CM

## 2019-07-02 PROCEDURE — 3077F SYST BP >= 140 MM HG: CPT | Mod: CPTII,S$GLB,, | Performed by: PODIATRIST

## 2019-07-02 PROCEDURE — 3044F PR MOST RECENT HEMOGLOBIN A1C LEVEL <7.0%: ICD-10-PCS | Mod: CPTII,S$GLB,, | Performed by: PODIATRIST

## 2019-07-02 PROCEDURE — 3077F PR MOST RECENT SYSTOLIC BLOOD PRESSURE >= 140 MM HG: ICD-10-PCS | Mod: CPTII,S$GLB,, | Performed by: PODIATRIST

## 2019-07-02 PROCEDURE — 3078F DIAST BP <80 MM HG: CPT | Mod: CPTII,S$GLB,, | Performed by: PODIATRIST

## 2019-07-02 PROCEDURE — 99999 PR PBB SHADOW E&M-EST. PATIENT-LVL III: ICD-10-PCS | Mod: PBBFAC,,, | Performed by: PODIATRIST

## 2019-07-02 PROCEDURE — 99213 PR OFFICE/OUTPT VISIT, EST, LEVL III, 20-29 MIN: ICD-10-PCS | Mod: S$GLB,,, | Performed by: PODIATRIST

## 2019-07-02 PROCEDURE — 1101F PT FALLS ASSESS-DOCD LE1/YR: CPT | Mod: CPTII,S$GLB,, | Performed by: PODIATRIST

## 2019-07-02 PROCEDURE — 3078F PR MOST RECENT DIASTOLIC BLOOD PRESSURE < 80 MM HG: ICD-10-PCS | Mod: CPTII,S$GLB,, | Performed by: PODIATRIST

## 2019-07-02 PROCEDURE — 99999 PR PBB SHADOW E&M-EST. PATIENT-LVL III: CPT | Mod: PBBFAC,,, | Performed by: PODIATRIST

## 2019-07-02 PROCEDURE — 3044F HG A1C LEVEL LT 7.0%: CPT | Mod: CPTII,S$GLB,, | Performed by: PODIATRIST

## 2019-07-02 PROCEDURE — 1101F PR PT FALLS ASSESS DOC 0-1 FALLS W/OUT INJ PAST YR: ICD-10-PCS | Mod: CPTII,S$GLB,, | Performed by: PODIATRIST

## 2019-07-02 PROCEDURE — 99213 OFFICE O/P EST LOW 20 MIN: CPT | Mod: S$GLB,,, | Performed by: PODIATRIST

## 2019-07-02 RX ORDER — PEN NEEDLE, DIABETIC 31 GX5/16"
NEEDLE, DISPOSABLE MISCELLANEOUS
COMMUNITY
Start: 2019-06-21 | End: 2021-02-12

## 2019-07-02 NOTE — LETTER
July 7, 2019      Jose Mary MD  [address] [facility] [place] [address]           [facility] - Podiatry/Wound Care  [facility] [facility] [address]  Phone: [phone]  Fax: [phone]          Patient: Marifer Bernardo   MR Number: [id number]   YOB: 1950   Date of Visit: 7/2/2019       Dear Dr. Jose Mary:    Thank you for referring Marifer Bernardo to me for evaluation. Attached you will find relevant portions of my assessment and plan of care.    If you have questions, please do not hesitate to call me. I look forward to following Marifer Bernardo along with you.    Sincerely,    Osvaldo Arreola, DPRICK    Enclosure  CC:  No Recipients    If you would like to receive this communication electronically, please contact [email or web address] or [phone] to request more information on [employer] Link access.    For providers and/or their staff who would like to refer a patient to [facility], please contact us through our one-stop-shop provider referral line, [facility] [place], at [phone].    If you feel you have received this communication in error or would no longer like to receive these types of communications, please e-mail [email or web address]

## 2019-07-07 NOTE — PROGRESS NOTES
Subjective:       Patient ID: Marifer Bernardo is a 69 y.o. female.    Chief Complaint: Follow-up; Foot Ulcer; Diabetes Mellitus; and Nail Problem    Foot Ulcer   Associated symptoms include arthralgias, joint swelling and numbness.   Nail Problem   Associated symptoms include arthralgias, joint swelling and numbness.    patient presents today she has been experiencing swelling in her left foot she also has a chronic ulceration on the bottom of her left great toe patient relates that she had been seeing Dr. Nelson before he retired for a callus on her left big toe she states she subsequently saw Dr. Clemons in in November of 2018 Dr. Clemons did surgery to cut the tendon on the bottom of her left big toe ever since that time she has had a wound in this area that has drained from time to time and has never completely healed.  Patient had a hip replacement in January right side.  Patient is a type 1 diabetic times 20 years.  Patient states the ulceration that was initially on the bottom of her left great toe the doctor Omar had started out treating subsequently healed it was after it healed that Dr. Clemons did surgery to prevent it from coming back which is now led to the ulceration that she presents with today.  Patient has a bag of all different types of ointment that she has been applying to the area from time to.  Review of Systems   Musculoskeletal: Positive for arthralgias, gait problem and joint swelling.   Neurological: Positive for numbness.   All other systems reviewed and are negative.      Objective:      Physical Exam   Constitutional: She appears well-developed and well-nourished.   Cardiovascular:   Pulses:       Dorsalis pedis pulses are 1+ on the right side, and 1+ on the left side.        Posterior tibial pulses are 1+ on the right side, and 1+ on the left side.   Pulmonary/Chest: Effort normal.   Musculoskeletal: Normal range of motion. She exhibits edema, tenderness and deformity.        Left foot:  There is deformity.   Feet:   Right Foot:   Protective Sensation: 4 sites tested. 3 sites sensed.   Left Foot:   Protective Sensation: 4 sites tested. 3 sites sensed.   Skin Integrity: Positive for ulcer, skin breakdown, erythema and callus.   Neurological: She is alert.   Skin: Skin is warm. Capillary refill takes more than 3 seconds. There is erythema.   Psychiatric: She has a normal mood and affect. Her behavior is normal. Judgment and thought content normal.   Nursing note and vitals reviewed.                      Assessment:       1. Type 1 diabetes mellitus with diabetic neuropathy    2. Skin ulcer of left foot with fat layer exposed    3. Osteoarthritis of ankle and foot, unspecified laterality        Plan:       Patient presents today for follow-up of an ulceration on the plantar surface of the patient's left hallux.    Patient was advised the wound itself looks considerably better since I saw her last week it is still approximately 1.0 cm long by 0.5 cm wide however it is not as deep as it had been it has filled in some it is more of a superficial ulcer at this time I have advised the patient we need to make sure we get the infection completely resolved before we focus on the healing part of this treatment. Following non excisional debridement patient advised the area continues to look very good there is just a linear opening this still remains we do need to offload the pressure area better some going to dispense the patient some horseshoe pads she is going to clean the area at each time of the dressing change every 24 hr.   Patient advised since the wound looks so much better and has improved dramatically I want make sure there were offloading pressure from the area but we also need to settle down the inflammation surrounding the bone spurs the previous ulceration I have dispensed the patient Flector patches she is going to cut these to size place this over the area and wear this during the day taking it  off at night to ensure that it does not become macerated she may need to put a little Coban around the area to keep it in place on the patient's left hallux.  Plan follow-up will be 2-3 weeks any problems questions or concerns she is to contact me..  Total face-to-face time including discussion evaluation non excisional debridement and wound care equaled 20 min.  Follow-up 2 weeks.  Complete diabetic evaluation performed.

## 2019-07-23 ENCOUNTER — OFFICE VISIT (OUTPATIENT)
Dept: PODIATRY | Facility: CLINIC | Age: 69
End: 2019-07-23
Payer: MEDICARE

## 2019-07-23 VITALS
HEART RATE: 74 BPM | HEIGHT: 64 IN | TEMPERATURE: 99 F | DIASTOLIC BLOOD PRESSURE: 70 MMHG | BODY MASS INDEX: 41.83 KG/M2 | SYSTOLIC BLOOD PRESSURE: 151 MMHG | WEIGHT: 245 LBS

## 2019-07-23 DIAGNOSIS — E10.40 TYPE 1 DIABETES MELLITUS WITH DIABETIC NEUROPATHY: Primary | ICD-10-CM

## 2019-07-23 DIAGNOSIS — L97.522 SKIN ULCER OF LEFT FOOT WITH FAT LAYER EXPOSED: ICD-10-CM

## 2019-07-23 PROCEDURE — 1101F PR PT FALLS ASSESS DOC 0-1 FALLS W/OUT INJ PAST YR: ICD-10-PCS | Mod: CPTII,S$GLB,, | Performed by: PODIATRIST

## 2019-07-23 PROCEDURE — 11042 WOUND DEBRIDEMENT: ICD-10-PCS | Mod: S$GLB,,, | Performed by: PODIATRIST

## 2019-07-23 PROCEDURE — 99999 PR PBB SHADOW E&M-EST. PATIENT-LVL III: ICD-10-PCS | Mod: PBBFAC,,, | Performed by: PODIATRIST

## 2019-07-23 PROCEDURE — 99213 OFFICE O/P EST LOW 20 MIN: CPT | Mod: 25,S$GLB,, | Performed by: PODIATRIST

## 2019-07-23 PROCEDURE — 3078F PR MOST RECENT DIASTOLIC BLOOD PRESSURE < 80 MM HG: ICD-10-PCS | Mod: CPTII,S$GLB,, | Performed by: PODIATRIST

## 2019-07-23 PROCEDURE — 3077F PR MOST RECENT SYSTOLIC BLOOD PRESSURE >= 140 MM HG: ICD-10-PCS | Mod: CPTII,S$GLB,, | Performed by: PODIATRIST

## 2019-07-23 PROCEDURE — 99999 PR PBB SHADOW E&M-EST. PATIENT-LVL III: CPT | Mod: PBBFAC,,, | Performed by: PODIATRIST

## 2019-07-23 PROCEDURE — 11042 DBRDMT SUBQ TIS 1ST 20SQCM/<: CPT | Mod: S$GLB,,, | Performed by: PODIATRIST

## 2019-07-23 PROCEDURE — 3077F SYST BP >= 140 MM HG: CPT | Mod: CPTII,S$GLB,, | Performed by: PODIATRIST

## 2019-07-23 PROCEDURE — 3044F PR MOST RECENT HEMOGLOBIN A1C LEVEL <7.0%: ICD-10-PCS | Mod: CPTII,S$GLB,, | Performed by: PODIATRIST

## 2019-07-23 PROCEDURE — 1101F PT FALLS ASSESS-DOCD LE1/YR: CPT | Mod: CPTII,S$GLB,, | Performed by: PODIATRIST

## 2019-07-23 PROCEDURE — 3044F HG A1C LEVEL LT 7.0%: CPT | Mod: CPTII,S$GLB,, | Performed by: PODIATRIST

## 2019-07-23 PROCEDURE — 99213 PR OFFICE/OUTPT VISIT, EST, LEVL III, 20-29 MIN: ICD-10-PCS | Mod: 25,S$GLB,, | Performed by: PODIATRIST

## 2019-07-23 PROCEDURE — 3078F DIAST BP <80 MM HG: CPT | Mod: CPTII,S$GLB,, | Performed by: PODIATRIST

## 2019-07-23 NOTE — LETTER
July 28, 2019      Jose Mary MD  [address] [facility] [place] [address]           [facility] - Podiatry/Wound Care  [facility] [address]  Phone: [phone]  Fax: [phone]          Patient: Marifer Bernardo   MR Number: [id number]   YOB: 1950   Date of Visit: 7/23/2019       Dear Dr. Jose Mary:    Thank you for referring Marifer Bernardo to me for evaluation. Attached you will find relevant portions of my assessment and plan of care.    If you have questions, please do not hesitate to call me. I look forward to following Marifer Bernardo along with you.    Sincerely,    Osvaldo Arreola, DPRICK    Enclosure  CC:  No Recipients    If you would like to receive this communication electronically, please contact [email or web address] or [phone] to request more information on [employer] Link access.    For providers and/or their staff who would like to refer a patient to [facility], please contact us through our one-stop-shop provider referral line, [facility], at [phone].    If you feel you have received this communication in error or would no longer like to receive these types of communications, please e-mail [email or web address]

## 2019-07-29 NOTE — PROCEDURES
"Wound Debridement  Date/Time: 7/23/2019 7:14 PM  Performed by: Osvaldo Arreola DPM  Authorized by: Osvaldo Arreola DPM     Time out: Immediately prior to procedure a "time out" was called to verify the correct patient, procedure, equipment, support staff and site/side marked as required.    Consent Done?:  Yes (Verbal)    Preparation: Patient was prepped and draped in usual sterile fashion    Local anesthesia used?: No      Wound Details:    Location:  Left foot    Location:  Left 1st Toe       Length (cm):  1       Area (sq cm):  0.5       Width (cm):  0.5       Percent Debrided (%):  100       Depth (cm):  0.2       Total Area Debrided (sq cm):  0.5    Depth of debridement:  Subcutaneous tissue    Tissue debrided:  Dermis and Epidermis    Devitalized tissue debrided:  Callus, Slough and Necrotic/Eschar    Instruments:  Blade    Bleeding:  None  Patient tolerance:  Patient tolerated the procedure well with no immediate complications      "

## 2019-07-29 NOTE — PROGRESS NOTES
Subjective:       Patient ID: Marifer Bernardo is a 69 y.o. female.    Chief Complaint: Follow-up; Foot Ulcer; and Diabetes Mellitus  patient presents today she has been experiencing swelling in her left foot she also has a chronic ulceration on the bottom of her left great toe patient relates that she had been seeing Dr. Nelson before he retired for a callus on her left big toe she states she subsequently saw Dr. Clemons in in November of 2018 Dr. Clemons did surgery to cut the tendon on the bottom of her left big toe ever since that time she has had a wound in this area that has drained from time to time and has never completely healed.  Patient had a hip replacement in January right side.  Patient is a type 1 diabetic times 20 years.  Patient states the ulceration that was initially on the bottom of her left great toe the doctor Omar had started out treating subsequently healed it was after it healed that Dr. Clemons did surgery to prevent it from coming back which is now led to the ulceration that she presents with today.  Patient has a bag of all different types of ointment that she has been applying to the area from time to.  Foot Ulcer   Associated symptoms include arthralgias, joint swelling and numbness.   Nail Problem   Associated symptoms include arthralgias, joint swelling and numbness.      Review of Systems   Musculoskeletal: Positive for arthralgias, gait problem and joint swelling.   Neurological: Positive for numbness.   All other systems reviewed and are negative.      Objective:      Physical Exam   Constitutional: She appears well-developed and well-nourished.   Cardiovascular:   Pulses:       Dorsalis pedis pulses are 1+ on the right side, and 1+ on the left side.        Posterior tibial pulses are 1+ on the right side, and 1+ on the left side.   Pulmonary/Chest: Effort normal.   Musculoskeletal: Normal range of motion. She exhibits edema, tenderness and deformity.        Left foot: There is  deformity.   Feet:   Right Foot:   Protective Sensation: 4 sites tested. 3 sites sensed.   Left Foot:   Protective Sensation: 4 sites tested. 3 sites sensed.   Skin Integrity: Positive for ulcer, skin breakdown, erythema and callus.   Neurological: She is alert.   Skin: Skin is warm. Capillary refill takes more than 3 seconds. There is erythema.   Psychiatric: She has a normal mood and affect. Her behavior is normal. Judgment and thought content normal.   Nursing note and vitals reviewed.                          Assessment:       1. Type 1 diabetes mellitus with diabetic neuropathy    2. Skin ulcer of left foot with fat layer exposed        Plan:       Patient presents today for follow-up of an ulceration on the plantar surface of the patient's left hallux.    Patient was advised the wound itself looks considerably better since I saw her last week it is still approximately 1.0 cm long by 0.5 cm wide however it is not as deep as it had been it has filled in some it is more of a superficial ulcer at this time I have advised the patient we need to make sure we get the infection completely resolved before we focus on the healing part of this treatment. Following non excisional debridement patient advised the area continues to look very good there is just a linear opening this still remains we do need to offload the pressure area better some going to dispense the patient some horseshoe pads she is going to clean the area at each time of the dressing change every 24 hr.   Patient advised since the wound looks so much better and has improved dramatically I want make sure there were offloading pressure from the area but we also need to settle down the inflammation surrounding the bone spurs the previous ulceration I have dispensed the patient Flector patches she is going to cut these to size place this over the area and wear this during the day taking it off at night to ensure that it does not become macerated she may need  to put a little Coban around the area to keep it in place on the patient's left hallux.  Plan follow-up will be 4 weeks any problems questions or concerns she is to contact me..  Total face-to-face time including discussion evaluation non excisional debridement and wound care equaled 20 min.    Complete diabetic evaluation performed.  I did discuss surgical excision of this soft tissue mass and scar tissue that is present that is continuing to maintain this ulceration I have advised the patient to think about this carefully she would likely have to non be weight-bearing for least 4 weeks following this type of procedure. I have advised the patient certainly we can continue with conservative treatment she definitely needs to keep this area clean daily with Dakin solution and I will debride this area when I follow up with her in 1 month.  Debridement performed today as documented.

## 2019-08-27 ENCOUNTER — OFFICE VISIT (OUTPATIENT)
Dept: PODIATRY | Facility: CLINIC | Age: 69
End: 2019-08-27
Payer: MEDICARE

## 2019-08-27 VITALS
WEIGHT: 253 LBS | BODY MASS INDEX: 43.19 KG/M2 | HEART RATE: 77 BPM | TEMPERATURE: 98 F | SYSTOLIC BLOOD PRESSURE: 156 MMHG | HEIGHT: 64 IN | DIASTOLIC BLOOD PRESSURE: 66 MMHG

## 2019-08-27 DIAGNOSIS — M19.079 OSTEOARTHRITIS OF ANKLE AND FOOT, UNSPECIFIED LATERALITY: ICD-10-CM

## 2019-08-27 DIAGNOSIS — E10.40 TYPE 1 DIABETES MELLITUS WITH DIABETIC NEUROPATHY: Primary | ICD-10-CM

## 2019-08-27 DIAGNOSIS — L97.522 SKIN ULCER OF LEFT FOOT WITH FAT LAYER EXPOSED: ICD-10-CM

## 2019-08-27 PROCEDURE — 99999 PR PBB SHADOW E&M-EST. PATIENT-LVL III: ICD-10-PCS | Mod: PBBFAC,,, | Performed by: PODIATRIST

## 2019-08-27 PROCEDURE — 11042 WOUND DEBRIDEMENT: ICD-10-PCS | Mod: S$GLB,,, | Performed by: PODIATRIST

## 2019-08-27 PROCEDURE — 3044F HG A1C LEVEL LT 7.0%: CPT | Mod: CPTII,S$GLB,, | Performed by: PODIATRIST

## 2019-08-27 PROCEDURE — 3077F SYST BP >= 140 MM HG: CPT | Mod: CPTII,S$GLB,, | Performed by: PODIATRIST

## 2019-08-27 PROCEDURE — 3078F DIAST BP <80 MM HG: CPT | Mod: CPTII,S$GLB,, | Performed by: PODIATRIST

## 2019-08-27 PROCEDURE — 3077F PR MOST RECENT SYSTOLIC BLOOD PRESSURE >= 140 MM HG: ICD-10-PCS | Mod: CPTII,S$GLB,, | Performed by: PODIATRIST

## 2019-08-27 PROCEDURE — 1101F PT FALLS ASSESS-DOCD LE1/YR: CPT | Mod: CPTII,S$GLB,, | Performed by: PODIATRIST

## 2019-08-27 PROCEDURE — 11042 DBRDMT SUBQ TIS 1ST 20SQCM/<: CPT | Mod: S$GLB,,, | Performed by: PODIATRIST

## 2019-08-27 PROCEDURE — 99213 PR OFFICE/OUTPT VISIT, EST, LEVL III, 20-29 MIN: ICD-10-PCS | Mod: 25,S$GLB,, | Performed by: PODIATRIST

## 2019-08-27 PROCEDURE — 3044F PR MOST RECENT HEMOGLOBIN A1C LEVEL <7.0%: ICD-10-PCS | Mod: CPTII,S$GLB,, | Performed by: PODIATRIST

## 2019-08-27 PROCEDURE — 1101F PR PT FALLS ASSESS DOC 0-1 FALLS W/OUT INJ PAST YR: ICD-10-PCS | Mod: CPTII,S$GLB,, | Performed by: PODIATRIST

## 2019-08-27 PROCEDURE — 99999 PR PBB SHADOW E&M-EST. PATIENT-LVL III: CPT | Mod: PBBFAC,,, | Performed by: PODIATRIST

## 2019-08-27 PROCEDURE — 3078F PR MOST RECENT DIASTOLIC BLOOD PRESSURE < 80 MM HG: ICD-10-PCS | Mod: CPTII,S$GLB,, | Performed by: PODIATRIST

## 2019-08-27 PROCEDURE — 99213 OFFICE O/P EST LOW 20 MIN: CPT | Mod: 25,S$GLB,, | Performed by: PODIATRIST

## 2019-08-27 RX ORDER — CALCIUM CITRATE/VITAMIN D3 200MG-6.25
TABLET ORAL
COMMUNITY
Start: 2019-08-10 | End: 2021-07-23

## 2019-08-27 NOTE — LETTER
September 1, 2019      Jose Mary MD  [address] [facility] [place] [address]           [facility] - Podiatry/Wound Care  [facility] [facility] [address]  Phone: [phone]  Fax: [phone]          Patient: Marifer Bernardo   MR Number: [id number]   YOB: 1950   Date of Visit: 8/27/2019       Dear Dr. Jose Mary:    Thank you for referring Marifer Bernardo to me for evaluation. Attached you will find relevant portions of my assessment and plan of care.    If you have questions, please do not hesitate to call me. I look forward to following Marifer Bernardo along with you.    Sincerely,    Osvaldo Arreola, DPRICK    Enclosure  CC:  No Recipients    If you would like to receive this communication electronically, please contact [email or web address] or [phone] to request more information on [employer] Link access.    For providers and/or their staff who would like to refer a patient to [facility], please contact us through our one-stop-shop provider referral line, [facility], at [phone].    If you feel you have received this communication in error or would no longer like to receive these types of communications, please e-mail [email or web address]

## 2019-09-01 NOTE — PROGRESS NOTES
Subjective:       Patient ID: Marifer Bernardo is a 69 y.o. female.    Chief Complaint: No chief complaint on file.  patient presents today she has been experiencing swelling in her left foot she also has a chronic ulceration on the bottom of her left great toe patient relates that she had been seeing Dr. Nelson before he retired for a callus on her left big toe she states she subsequently saw Dr. Clemons in in November of 2018 Dr. Clemons did surgery to cut the tendon on the bottom of her left big toe ever since that time she has had a wound in this area that has drained from time to time and has never completely healed.  Patient had a hip replacement in January right side.  Patient is a type 1 diabetic times 20 years.  Patient states the ulceration that was initially on the bottom of her left great toe the doctor Omar had started out treating subsequently healed it was after it healed that Dr. Clemons did surgery to prevent it from coming back which is now led to the ulceration that she presents with today.  Patient has a bag of all different types of ointment that she has been applying to the area from time to.  Foot Ulcer   Associated symptoms include arthralgias, joint swelling and numbness.   Nail Problem   Associated symptoms include arthralgias, joint swelling and numbness.      Review of Systems   Musculoskeletal: Positive for arthralgias, gait problem and joint swelling.   Neurological: Positive for numbness.   All other systems reviewed and are negative.      Objective:      Physical Exam   Constitutional: She appears well-developed and well-nourished.   Cardiovascular:   Pulses:       Dorsalis pedis pulses are 1+ on the right side, and 1+ on the left side.        Posterior tibial pulses are 1+ on the right side, and 1+ on the left side.   Pulmonary/Chest: Effort normal.   Musculoskeletal: Normal range of motion. She exhibits edema, tenderness and deformity.        Left foot: There is deformity.   Feet:   Right  Foot:   Protective Sensation: 4 sites tested. 3 sites sensed.   Left Foot:   Protective Sensation: 4 sites tested. 3 sites sensed.   Skin Integrity: Positive for ulcer, skin breakdown, erythema and callus.   Neurological: She is alert.   Skin: Skin is warm. Capillary refill takes more than 3 seconds. There is erythema.   Psychiatric: She has a normal mood and affect. Her behavior is normal. Judgment and thought content normal.   Nursing note and vitals reviewed.                                  Assessment:       1. Type 1 diabetes mellitus with diabetic neuropathy    2. Skin ulcer of left foot with fat layer exposed    3. Osteoarthritis of ankle and foot, unspecified laterality        Plan:       Patient presents today for follow-up of an ulceration on the plantar surface of the patient's left hallux.    Patient was advised the wound itself looks considerably better since I saw her last week it is still approximately 1.0 cm long by 0.5 cm wide however it is not as deep as it had been it has filled in some it is more of a superficial ulcer at this time I have advised the patient we need to make sure we get the infection completely resolved before we focus on the healing part of this treatment. Following non excisional debridement patient advised the area continues to look very good there is just a linear opening this still remains we do need to offload the pressure area better some going to dispense the patient some horseshoe pads she is going to clean the area at each time of the dressing change every 24 hr.   Patient advised since the wound looks so much better and has improved dramatically I want make sure there were offloading pressure from the area but we also need to settle down the inflammation surrounding the bone spurs the previous ulceration I have dispensed the patient Flector patches she is going to cut these to size place this over the area and wear this during the day taking it off at night to ensure  that it does not become macerated she may need to put a little Coban around the area to keep it in place on the patient's left hallux.  Plan follow-up will be 4 weeks any problems questions or concerns she is to contact me..  Total face-to-face time including discussion evaluation non excisional debridement and wound care equaled 20 min.    Complete diabetic evaluation performed.  I did discuss surgical excision of this soft tissue mass and scar tissue that is present that is continuing to maintain this ulceration I have advised the patient to think about this carefully she would likely have to non be weight-bearing for least 4 weeks following this type of procedure. I have advised the patient certainly we can continue with conservative treatment she definitely needs to keep this area clean daily with Dakin solution and I will debride this area when I follow up with her in 1 month.  Debridement performed today as documented.  Patient did state today that she is going to consider surgical intervention however it would have to be after October she has some plans I have advised her that there is no rush as long as she is able to keep the area clean and infection-free.

## 2019-09-01 NOTE — PROCEDURES
"Wound Debridement  Date/Time: 8/27/2019 1:14 PM  Performed by: Osvaldo Arreola DPM  Authorized by: Osvaldo Arreola DPM     Time out: Immediately prior to procedure a "time out" was called to verify the correct patient, procedure, equipment, support staff and site/side marked as required.    Consent Done?:  Yes (Verbal)    Preparation: Patient was prepped and draped in usual sterile fashion    Local anesthesia used?: No      Wound Details:    Location:  Left foot    Location:  Left 1st Toe    Type of Debridement:  Excisional       Length (cm):  1       Area (sq cm):  1       Width (cm):  1       Percent Debrided (%):  100       Depth (cm):  0.3       Total Area Debrided (sq cm):  1    Depth of debridement:  Subcutaneous tissue    Devitalized tissue debrided:  Callus, Slough and Necrotic/Eschar    Instruments:  Blade    Bleeding:  None  Patient tolerance:  Patient tolerated the procedure well with no immediate complications      "

## 2019-10-01 ENCOUNTER — OFFICE VISIT (OUTPATIENT)
Dept: PODIATRY | Facility: CLINIC | Age: 69
End: 2019-10-01
Payer: MEDICARE

## 2019-10-01 VITALS
HEART RATE: 74 BPM | WEIGHT: 253 LBS | TEMPERATURE: 98 F | BODY MASS INDEX: 43.19 KG/M2 | SYSTOLIC BLOOD PRESSURE: 155 MMHG | HEIGHT: 64 IN | DIASTOLIC BLOOD PRESSURE: 70 MMHG

## 2019-10-01 DIAGNOSIS — L97.522 SKIN ULCER OF LEFT FOOT WITH FAT LAYER EXPOSED: ICD-10-CM

## 2019-10-01 DIAGNOSIS — E10.40 TYPE 1 DIABETES MELLITUS WITH DIABETIC NEUROPATHY: Primary | ICD-10-CM

## 2019-10-01 PROCEDURE — 99213 PR OFFICE/OUTPT VISIT, EST, LEVL III, 20-29 MIN: ICD-10-PCS | Mod: 25,S$GLB,, | Performed by: PODIATRIST

## 2019-10-01 PROCEDURE — 99999 PR PBB SHADOW E&M-EST. PATIENT-LVL III: CPT | Mod: PBBFAC,,, | Performed by: PODIATRIST

## 2019-10-01 PROCEDURE — 3044F HG A1C LEVEL LT 7.0%: CPT | Mod: CPTII,S$GLB,, | Performed by: PODIATRIST

## 2019-10-01 PROCEDURE — 3077F SYST BP >= 140 MM HG: CPT | Mod: CPTII,S$GLB,, | Performed by: PODIATRIST

## 2019-10-01 PROCEDURE — 3044F PR MOST RECENT HEMOGLOBIN A1C LEVEL <7.0%: ICD-10-PCS | Mod: CPTII,S$GLB,, | Performed by: PODIATRIST

## 2019-10-01 PROCEDURE — 99999 PR PBB SHADOW E&M-EST. PATIENT-LVL III: ICD-10-PCS | Mod: PBBFAC,,, | Performed by: PODIATRIST

## 2019-10-01 PROCEDURE — 3077F PR MOST RECENT SYSTOLIC BLOOD PRESSURE >= 140 MM HG: ICD-10-PCS | Mod: CPTII,S$GLB,, | Performed by: PODIATRIST

## 2019-10-01 PROCEDURE — 1101F PR PT FALLS ASSESS DOC 0-1 FALLS W/OUT INJ PAST YR: ICD-10-PCS | Mod: CPTII,S$GLB,, | Performed by: PODIATRIST

## 2019-10-01 PROCEDURE — 99213 OFFICE O/P EST LOW 20 MIN: CPT | Mod: 25,S$GLB,, | Performed by: PODIATRIST

## 2019-10-01 PROCEDURE — 3078F DIAST BP <80 MM HG: CPT | Mod: CPTII,S$GLB,, | Performed by: PODIATRIST

## 2019-10-01 PROCEDURE — 3078F PR MOST RECENT DIASTOLIC BLOOD PRESSURE < 80 MM HG: ICD-10-PCS | Mod: CPTII,S$GLB,, | Performed by: PODIATRIST

## 2019-10-01 PROCEDURE — 11042 DBRDMT SUBQ TIS 1ST 20SQCM/<: CPT | Mod: S$GLB,,, | Performed by: PODIATRIST

## 2019-10-01 PROCEDURE — 1101F PT FALLS ASSESS-DOCD LE1/YR: CPT | Mod: CPTII,S$GLB,, | Performed by: PODIATRIST

## 2019-10-01 PROCEDURE — 11042 WOUND DEBRIDEMENT: ICD-10-PCS | Mod: S$GLB,,, | Performed by: PODIATRIST

## 2019-10-01 NOTE — LETTER
October 5, 2019      Jose Mary MD  [address] [facility] [place] [address]           [facility] - Podiatry/Wound Care  [facility] [facility] [address]  Phone: [phone]  Fax: [phone]          Patient: Marifer Bernardo   MR Number: [id number]   YOB: 1950   Date of Visit: 10/1/2019       Dear Dr. Jose Mary:    Thank you for referring Marifer Bernardo to me for evaluation. Attached you will find relevant portions of my assessment and plan of care.    If you have questions, please do not hesitate to call me. I look forward to following Marifer Bernardo along with you.    Sincerely,    Osvaldo Arreola, DPRICK    Enclosure  CC:  No Recipients    If you would like to receive this communication electronically, please contact [email or web address] or [phone] to request more information on [employer] Link access.    For providers and/or their staff who would like to refer a patient to [facility], please contact us through our one-stop-shop provider referral line, [facility], at [phone].    If you feel you have received this communication in error or would no longer like to receive these types of communications, please e-mail [email or web address]

## 2019-10-05 NOTE — PROGRESS NOTES
Subjective:       Patient ID: Marifer Bernardo is a 69 y.o. female.    Chief Complaint: Follow-up; Foot Ulcer; Diabetes Mellitus; and Foot Problem  patient presents today she has been experiencing swelling in her left foot she also has a chronic ulceration on the bottom of her left great toe patient relates that she had been seeing Dr. Nelson before he retired for a callus on her left big toe she states she subsequently saw Dr. Clemons in in November of 2018 Dr. Clemons did surgery to cut the tendon on the bottom of her left big toe ever since that time she has had a wound in this area that has drained from time to time and has never completely healed.  Patient had a hip replacement in January right side.  Patient is a type 1 diabetic times 20 years.  Patient states the ulceration that was initially on the bottom of her left great toe the doctor Omar had started out treating subsequently healed it was after it healed that Dr. Clemons did surgery to prevent it from coming back which is now led to the ulceration that she presents with today.  Patient has a bag of all different types of ointment that she has been applying to the area from time to.  Foot Ulcer   Associated symptoms include arthralgias, joint swelling and numbness.   Nail Problem   Associated symptoms include arthralgias, joint swelling and numbness.   Follow-up   Associated symptoms include arthralgias, joint swelling and numbness.      Review of Systems   Musculoskeletal: Positive for arthralgias, gait problem and joint swelling.   Neurological: Positive for numbness.   All other systems reviewed and are negative.      Objective:      Physical Exam   Constitutional: She appears well-developed and well-nourished.   Cardiovascular:   Pulses:       Dorsalis pedis pulses are 1+ on the right side, and 1+ on the left side.        Posterior tibial pulses are 1+ on the right side, and 1+ on the left side.   Pulmonary/Chest: Effort normal.   Musculoskeletal: Normal  range of motion. She exhibits edema, tenderness and deformity.        Left foot: There is deformity.   Feet:   Right Foot:   Protective Sensation: 4 sites tested. 3 sites sensed.   Left Foot:   Protective Sensation: 4 sites tested. 3 sites sensed.   Skin Integrity: Positive for ulcer, skin breakdown, erythema and callus.   Neurological: She is alert.   Skin: Skin is warm. Capillary refill takes more than 3 seconds. There is erythema.   Psychiatric: She has a normal mood and affect. Her behavior is normal. Judgment and thought content normal.   Nursing note and vitals reviewed.                                          Assessment:       1. Type 1 diabetes mellitus with diabetic neuropathy    2. Skin ulcer of left foot with fat layer exposed        Plan:       Patient presents today for follow-up of an ulceration on the plantar surface of the patient's left hallux.    Patient was advised the wound itself looks considerably better since I saw her last week it is still approximately 1.0 cm long by 0.2 cm wide however it is not as deep as it had been it has filled in some it is more of a superficial ulcer at this time I have advised the patient we need to make sure we get the infection completely resolved before we focus on the healing part of this treatment. Following non excisional debridement patient advised the area continues to look very good there is just a linear opening this still remains we do need to offload the pressure area better some going to dispense the patient some horseshoe pads she is going to clean the area at each time of the dressing change every 24 hr.   Patient advised since the wound looks so much better and has improved dramatically I want make sure there were offloading pressure from the area but we also need to settle down the inflammation surrounding the bone spurs the previous ulceration I have dispensed the patient Flector patches she is going to cut these to size place this over the area  and wear this during the day taking it off at night to ensure that it does not become macerated she may need to put a little Coban around the area to keep it in place on the patient's left hallux.  Plan follow-up will be 4 weeks any problems questions or concerns she is to contact me..  Total face-to-face time including discussion evaluation non excisional debridement and wound care equaled 20 min.    Complete diabetic evaluation performed.  I did discuss surgical excision of this soft tissue mass and scar tissue that is present that is continuing to maintain this ulceration I have advised the patient to think about this carefully she would likely have to non be weight-bearing for least 4 weeks following this type of procedure. I have advised the patient certainly we can continue with conservative treatment she definitely needs to keep this area clean daily with Dakin solution and I will debride this area when I follow up with her in 1-1 1/2 month.  Debridement performed today as documented.  Patient did state today that she is going to consider surgical intervention however it would have to be after October she has some plans I have advised her that there is no rush as long as she is able to keep the area clean and infection-free.  [place] so she is not going to be able to come back any sooner than 4-6 weeks.  I have advised her the area does look better it has gotten slightly smaller she needs to continue to clean it and use the Medihoney as necessary to keep it from drying out too much.This note was created using [employer] voice recognition software that occasionally misinterpreted phrases or words.

## 2019-10-05 NOTE — PROCEDURES
"Wound Debridement  Date/Time: 10/1/2019 1:45 PM  Performed by: Osvaldo Arreola DPM  Authorized by: Osvaldo Arreola DPM     Time out: Immediately prior to procedure a "time out" was called to verify the correct patient, procedure, equipment, support staff and site/side marked as required.    Consent Done?:  Yes (Verbal)    Preparation: Patient was prepped and draped in usual sterile fashion    Local anesthesia used?: No      Wound Details:    Location:  Left foot    Location:  Left 1st Toe       Length (cm):  1       Area (sq cm):  0.2       Width (cm):  0.2       Percent Debrided (%):  100       Depth (cm):  0.2       Total Area Debrided (sq cm):  0.2    Depth of debridement:  Subcutaneous tissue    Devitalized tissue debrided:  Callus and Slough    Instruments:  Blade    Bleeding:  None  Patient tolerance:  Patient tolerated the procedure well with no immediate complications      "

## 2019-11-12 ENCOUNTER — OFFICE VISIT (OUTPATIENT)
Dept: PODIATRY | Facility: CLINIC | Age: 69
End: 2019-11-12
Payer: MEDICARE

## 2019-11-12 VITALS
DIASTOLIC BLOOD PRESSURE: 66 MMHG | WEIGHT: 253 LBS | HEART RATE: 81 BPM | HEIGHT: 64 IN | TEMPERATURE: 98 F | SYSTOLIC BLOOD PRESSURE: 153 MMHG | BODY MASS INDEX: 43.19 KG/M2

## 2019-11-12 DIAGNOSIS — E10.40 TYPE 1 DIABETES MELLITUS WITH DIABETIC NEUROPATHY: Primary | ICD-10-CM

## 2019-11-12 DIAGNOSIS — M19.079 OSTEOARTHRITIS OF ANKLE AND FOOT, UNSPECIFIED LATERALITY: ICD-10-CM

## 2019-11-12 DIAGNOSIS — L97.522 SKIN ULCER OF LEFT FOOT WITH FAT LAYER EXPOSED: ICD-10-CM

## 2019-11-12 PROCEDURE — 3077F PR MOST RECENT SYSTOLIC BLOOD PRESSURE >= 140 MM HG: ICD-10-PCS | Mod: CPTII,S$GLB,, | Performed by: PODIATRIST

## 2019-11-12 PROCEDURE — 3078F DIAST BP <80 MM HG: CPT | Mod: CPTII,S$GLB,, | Performed by: PODIATRIST

## 2019-11-12 PROCEDURE — 97597 WOUND DEBRIDEMENT: ICD-10-PCS | Mod: S$GLB,,, | Performed by: PODIATRIST

## 2019-11-12 PROCEDURE — 3078F PR MOST RECENT DIASTOLIC BLOOD PRESSURE < 80 MM HG: ICD-10-PCS | Mod: CPTII,S$GLB,, | Performed by: PODIATRIST

## 2019-11-12 PROCEDURE — 99999 PR PBB SHADOW E&M-EST. PATIENT-LVL III: ICD-10-PCS | Mod: PBBFAC,,, | Performed by: PODIATRIST

## 2019-11-12 PROCEDURE — 99213 OFFICE O/P EST LOW 20 MIN: CPT | Mod: 25,S$GLB,, | Performed by: PODIATRIST

## 2019-11-12 PROCEDURE — 97597 DBRDMT OPN WND 1ST 20 CM/<: CPT | Mod: S$GLB,,, | Performed by: PODIATRIST

## 2019-11-12 PROCEDURE — 3077F SYST BP >= 140 MM HG: CPT | Mod: CPTII,S$GLB,, | Performed by: PODIATRIST

## 2019-11-12 PROCEDURE — 3044F PR MOST RECENT HEMOGLOBIN A1C LEVEL <7.0%: ICD-10-PCS | Mod: CPTII,S$GLB,, | Performed by: PODIATRIST

## 2019-11-12 PROCEDURE — 99999 PR PBB SHADOW E&M-EST. PATIENT-LVL III: CPT | Mod: PBBFAC,,, | Performed by: PODIATRIST

## 2019-11-12 PROCEDURE — 1101F PR PT FALLS ASSESS DOC 0-1 FALLS W/OUT INJ PAST YR: ICD-10-PCS | Mod: CPTII,S$GLB,, | Performed by: PODIATRIST

## 2019-11-12 PROCEDURE — 1101F PT FALLS ASSESS-DOCD LE1/YR: CPT | Mod: CPTII,S$GLB,, | Performed by: PODIATRIST

## 2019-11-12 PROCEDURE — 99213 PR OFFICE/OUTPT VISIT, EST, LEVL III, 20-29 MIN: ICD-10-PCS | Mod: 25,S$GLB,, | Performed by: PODIATRIST

## 2019-11-12 PROCEDURE — 3044F HG A1C LEVEL LT 7.0%: CPT | Mod: CPTII,S$GLB,, | Performed by: PODIATRIST

## 2019-11-12 NOTE — LETTER
November 16, 2019      Jose Mary MD  [address] [facility] [place] [address]           [facility] - Podiatry/Wound Care  [facility] [facility] [address]  Phone: [phone]  Fax: [phone]          Patient: Marifer Bernardo   MR Number: [id number]   YOB: 1950   Date of Visit: 11/12/2019       Dear Dr. Jose Mary:    Thank you for referring Marifer Bernardo to me for evaluation. Attached you will find relevant portions of my assessment and plan of care.    If you have questions, please do not hesitate to call me. I look forward to following Marifer Bernardo along with you.    Sincerely,    Osvaldo Arreola, DPRICK    Enclosure  CC:  No Recipients    If you would like to receive this communication electronically, please contact [email or web address] or [phone] to request more information on [employer] access.    For providers and/or their staff who would like to refer a patient to [facility], please contact us through our one-stop-shop provider referral line, [facility], at [phone].    If you feel you have received this communication in error or would no longer like to receive these types of communications, please e-mail [email or web address]

## 2019-11-17 NOTE — PROGRESS NOTES
Subjective:       Patient ID: Marifer Bernardo is a 69 y.o. female.    Chief Complaint: Follow-up; Diabetes Mellitus; Foot Ulcer; and Foot Problem  patient presents today she has been experiencing swelling in her left foot she also has a chronic ulceration on the bottom of her left great toe patient relates that she had been seeing Dr. Nelson before he retired for a callus on her left big toe she states she subsequently saw Dr. Clemons in in November of 2018 Dr. Clemons did surgery to cut the tendon on the bottom of her left big toe ever since that time she has had a wound in this area that has drained from time to time and has never completely healed.  Patient had a hip replacement in January right side.  Patient is a type 1 diabetic times 20 years.  Patient states the ulceration that was initially on the bottom of her left great toe the doctor Omar had started out treating subsequently healed it was after it healed that Dr. Clemons did surgery to prevent it from coming back which is now led to the ulceration that she presents with today.  Patient has a bag of all different types of ointment that she has been applying to the area from time to.  Follow-up   Associated symptoms include arthralgias, joint swelling and numbness.   Foot Ulcer   Associated symptoms include arthralgias, joint swelling and numbness.   Nail Problem   Associated symptoms include arthralgias, joint swelling and numbness.      Review of Systems   Musculoskeletal: Positive for arthralgias, gait problem and joint swelling.   Neurological: Positive for numbness.   All other systems reviewed and are negative.      Objective:      Physical Exam   Constitutional: She appears well-developed and well-nourished.   Cardiovascular:   Pulses:       Dorsalis pedis pulses are 1+ on the right side, and 1+ on the left side.        Posterior tibial pulses are 1+ on the right side, and 1+ on the left side.   Pulmonary/Chest: Effort normal.   Musculoskeletal: Normal  range of motion. She exhibits edema, tenderness and deformity.        Left foot: There is deformity.   Feet:   Right Foot:   Protective Sensation: 4 sites tested. 3 sites sensed.   Left Foot:   Protective Sensation: 4 sites tested. 3 sites sensed.   Skin Integrity: Positive for ulcer, skin breakdown, erythema and callus.   Neurological: She is alert.   Skin: Skin is warm. Capillary refill takes more than 3 seconds. There is erythema.   Psychiatric: She has a normal mood and affect. Her behavior is normal. Judgment and thought content normal.   Nursing note and vitals reviewed.                      Assessment:       1. Type 1 diabetes mellitus with diabetic neuropathy    2. Skin ulcer of left foot with fat layer exposed    3. Osteoarthritis of ankle and foot, unspecified laterality        Plan:       Patient presents today for follow-up of an ulceration on the plantar surface of the patient's left hallux.    Patient was advised the wound itself looks considerably better since I saw her last week it is still approximately 1.0 cm long by 0.2 cm wide however it is not as deep as it had been it has filled in some it is more of a superficial ulcer at this time I have advised the patient we need to make sure we get the infection completely resolved before we focus on the healing part of this treatment. Following non excisional debridement patient advised the area continues to look very good there is just a linear opening this still remains we do need to offload the pressure area better some going to dispense the patient some horseshoe pads she is going to clean the area at each time of the dressing change every 24 hr.   Patient advised since the wound looks so much better and has improved dramatically I want make sure there were offloading pressure from the area but we also need to settle down the inflammation surrounding the bone spurs the previous ulceration I have dispensed the patient Flector patches she is going to  cut these to size place this over the area and wear this during the day taking it off at night to ensure that it does not become macerated she may need to put a little Coban around the area to keep it in place on the patient's left hallux.  Plan follow-up will be 4 weeks any problems questions or concerns she is to contact me..  Total face-to-face time including discussion evaluation non excisional debridement and wound care equaled 20 min.    Complete diabetic evaluation performed.  I did discuss surgical excision of this soft tissue mass and scar tissue that is present that is continuing to maintain this ulceration I have advised the patient to think about this carefully she would likely have to non be weight-bearing for least 4 weeks following this type of procedure. I have advised the patient certainly we can continue with conservative treatment she definitely needs to keep this area clean daily with Dakin solution and I will debride this area when I follow up with her in 1-1 1/2 month.  Debridement performed today as documented.  Patient indicated she is thinking about having this surgically debrided and closed primarily after the holidays I have advised her we could put her in a fracture boot to offload the pressure from the area she was in agreement with this.  This note was created using [employer] voice recognition software that occasionally misinterpreted phrases or words.

## 2019-11-17 NOTE — PROCEDURES
"Wound Debridement  Date/Time: 11/12/2019 1:30 PM  Performed by: Osvaldo Arreola DPM  Authorized by: Osvaldo Arreola DPM     Time out: Immediately prior to procedure a "time out" was called to verify the correct patient, procedure, equipment, support staff and site/side marked as required.    Consent Done?:  Yes (Verbal)    Preparation: Patient was prepped and draped in usual sterile fashion    Local anesthesia used?: No      Wound Details:    Location:  Left foot    Location:  Left 1st Toe    Type of Debridement:  Excisional       Length (cm):  1       Area (sq cm):  0.2       Width (cm):  0.2       Percent Debrided (%):  100       Depth (cm):  0.3       Total Area Debrided (sq cm):  0.2    Depth of debridement:  Epidermis/Dermis    Devitalized tissue debrided:  Callus and Slough    Instruments:  Blade    Bleeding:  Minimal  Hemostasis Achieved: Yes    Method Used:  Pressure  Patient tolerance:  Patient tolerated the procedure well with no immediate complications      "

## 2019-12-11 LAB
LEFT EYE DM RETINOPATHY: NEGATIVE
RIGHT EYE DM RETINOPATHY: NEGATIVE

## 2020-01-07 ENCOUNTER — OFFICE VISIT (OUTPATIENT)
Dept: PODIATRY | Facility: CLINIC | Age: 70
End: 2020-01-07
Payer: MEDICARE

## 2020-01-07 VITALS
WEIGHT: 253 LBS | BODY MASS INDEX: 43.19 KG/M2 | TEMPERATURE: 97 F | SYSTOLIC BLOOD PRESSURE: 162 MMHG | DIASTOLIC BLOOD PRESSURE: 59 MMHG | HEIGHT: 64 IN | HEART RATE: 69 BPM

## 2020-01-07 DIAGNOSIS — E10.40 TYPE 1 DIABETES MELLITUS WITH DIABETIC NEUROPATHY: Primary | ICD-10-CM

## 2020-01-07 DIAGNOSIS — M19.079 OSTEOARTHRITIS OF ANKLE AND FOOT, UNSPECIFIED LATERALITY: ICD-10-CM

## 2020-01-07 DIAGNOSIS — L97.522 SKIN ULCER OF LEFT FOOT WITH FAT LAYER EXPOSED: ICD-10-CM

## 2020-01-07 PROCEDURE — 99999 PR PBB SHADOW E&M-EST. PATIENT-LVL III: ICD-10-PCS | Mod: PBBFAC,,, | Performed by: PODIATRIST

## 2020-01-07 PROCEDURE — 1101F PR PT FALLS ASSESS DOC 0-1 FALLS W/OUT INJ PAST YR: ICD-10-PCS | Mod: CPTII,S$GLB,, | Performed by: PODIATRIST

## 2020-01-07 PROCEDURE — 1125F AMNT PAIN NOTED PAIN PRSNT: CPT | Mod: S$GLB,,, | Performed by: PODIATRIST

## 2020-01-07 PROCEDURE — 99999 PR PBB SHADOW E&M-EST. PATIENT-LVL III: CPT | Mod: PBBFAC,,, | Performed by: PODIATRIST

## 2020-01-07 PROCEDURE — 3044F PR MOST RECENT HEMOGLOBIN A1C LEVEL <7.0%: ICD-10-PCS | Mod: CPTII,S$GLB,, | Performed by: PODIATRIST

## 2020-01-07 PROCEDURE — 1159F MED LIST DOCD IN RCRD: CPT | Mod: S$GLB,,, | Performed by: PODIATRIST

## 2020-01-07 PROCEDURE — 1159F PR MEDICATION LIST DOCUMENTED IN MEDICAL RECORD: ICD-10-PCS | Mod: S$GLB,,, | Performed by: PODIATRIST

## 2020-01-07 PROCEDURE — 99213 PR OFFICE/OUTPT VISIT, EST, LEVL III, 20-29 MIN: ICD-10-PCS | Mod: 25,S$GLB,, | Performed by: PODIATRIST

## 2020-01-07 PROCEDURE — 1125F PR PAIN SEVERITY QUANTIFIED, PAIN PRESENT: ICD-10-PCS | Mod: S$GLB,,, | Performed by: PODIATRIST

## 2020-01-07 PROCEDURE — 3078F PR MOST RECENT DIASTOLIC BLOOD PRESSURE < 80 MM HG: ICD-10-PCS | Mod: CPTII,S$GLB,, | Performed by: PODIATRIST

## 2020-01-07 PROCEDURE — 3077F SYST BP >= 140 MM HG: CPT | Mod: CPTII,S$GLB,, | Performed by: PODIATRIST

## 2020-01-07 PROCEDURE — 3078F DIAST BP <80 MM HG: CPT | Mod: CPTII,S$GLB,, | Performed by: PODIATRIST

## 2020-01-07 PROCEDURE — 99213 OFFICE O/P EST LOW 20 MIN: CPT | Mod: 25,S$GLB,, | Performed by: PODIATRIST

## 2020-01-07 PROCEDURE — 3077F PR MOST RECENT SYSTOLIC BLOOD PRESSURE >= 140 MM HG: ICD-10-PCS | Mod: CPTII,S$GLB,, | Performed by: PODIATRIST

## 2020-01-07 PROCEDURE — 1101F PT FALLS ASSESS-DOCD LE1/YR: CPT | Mod: CPTII,S$GLB,, | Performed by: PODIATRIST

## 2020-01-07 PROCEDURE — 11042 DBRDMT SUBQ TIS 1ST 20SQCM/<: CPT | Mod: S$GLB,,, | Performed by: PODIATRIST

## 2020-01-07 PROCEDURE — 3044F HG A1C LEVEL LT 7.0%: CPT | Mod: CPTII,S$GLB,, | Performed by: PODIATRIST

## 2020-01-07 PROCEDURE — 11042 WOUND DEBRIDEMENT: ICD-10-PCS | Mod: S$GLB,,, | Performed by: PODIATRIST

## 2020-01-07 NOTE — LETTER
January 11, 2020      Jose Mary MD  [address] [facility] [place] [address]           [facility] - Podiatry/Wound Care  [facility] [facility] [address]  Phone: [phone]  Fax: [phone]          Patient: Marifer Bernardo   MR Number: [id number]   YOB: 1950   Date of Visit: 1/7/2020       Dear Dr. Jose Mary:    Thank you for referring Marifer Bernardo to me for evaluation. Attached you will find relevant portions of my assessment and plan of care.    If you have questions, please do not hesitate to call me. I look forward to following Marifer Bernardo along with you.    Sincerely,    Osvaldo Arreola, DPRICK    Enclosure  CC:  No Recipients    If you would like to receive this communication electronically, please contact [email or web address] or [phone] to request more information on [employer] Link access.    For providers and/or their staff who would like to refer a patient to [facility], please contact us through our one-stop-shop provider referral line, [email or web address], at [phone].    If you feel you have received this communication in error or would no longer like to receive these types of communications, please e-mail [email or web address]

## 2020-01-11 NOTE — PROCEDURES
"Wound Debridement  Date/Time: 1/7/2020 1:30 PM  Performed by: Osvaldo Arreola DPM  Authorized by: Osvaldo Arreola DPM     Time out: Immediately prior to procedure a "time out" was called to verify the correct patient, procedure, equipment, support staff and site/side marked as required.    Consent Done?:  Yes (Verbal)    Preparation: Patient was prepped and draped in usual sterile fashion    Local anesthesia used?: No      Wound Details:    Location:  Left foot    Location:  Left 1st Toe       Length (cm):  1       Area (sq cm):  0.5       Width (cm):  0.5       Percent Debrided (%):  100       Depth (cm):  0.3       Total Area Debrided (sq cm):  0.5    Depth of debridement:  Subcutaneous tissue    Devitalized tissue debrided:  Callus, Necrotic/Eschar and Slough    Instruments:  Blade    Bleeding:  None  Patient tolerance:  Patient tolerated the procedure well with no immediate complications      "

## 2020-01-11 NOTE — PROGRESS NOTES
Subjective:       Patient ID: Marifer Bernardo is a 69 y.o. female.    Chief Complaint: Follow-up; Nail Problem; Foot Ulcer; and Diabetes Mellitus  patient presents today she has been experiencing swelling in her left foot she also has a chronic ulceration on the bottom of her left great toe patient relates that she had been seeing Dr. Nelson before he retired for a callus on her left big toe she states she subsequently saw Dr. Clemons in in November of 2018 Dr. Clemons did surgery to cut the tendon on the bottom of her left big toe ever since that time she has had a wound in this area that has drained from time to time and has never completely healed.  Patient had a hip replacement in January right side.  Patient is a type 1 diabetic times 20 years.  Patient states the ulceration that was initially on the bottom of her left great toe the doctor Omar had started out treating subsequently healed it was after it healed that Dr. Clemons did surgery to prevent it from coming back which is now led to the ulceration that she presents with today.  Patient has a bag of all different types of ointment that she has been applying to the area from time to.  Follow-up   Associated symptoms include arthralgias, joint swelling and numbness.   Foot Ulcer   Associated symptoms include arthralgias, joint swelling and numbness.   Nail Problem   Associated symptoms include arthralgias, joint swelling and numbness.      Review of Systems   Musculoskeletal: Positive for arthralgias, gait problem and joint swelling.   Neurological: Positive for numbness.   All other systems reviewed and are negative.      Objective:      Physical Exam   Constitutional: She appears well-developed and well-nourished.   Cardiovascular:   Pulses:       Dorsalis pedis pulses are 1+ on the right side, and 1+ on the left side.        Posterior tibial pulses are 1+ on the right side, and 1+ on the left side.   Pulmonary/Chest: Effort normal.   Musculoskeletal: Normal  range of motion. She exhibits edema, tenderness and deformity.        Left foot: There is deformity.   Feet:   Right Foot:   Protective Sensation: 4 sites tested. 3 sites sensed.   Left Foot:   Protective Sensation: 4 sites tested. 3 sites sensed.   Skin Integrity: Positive for ulcer, skin breakdown, erythema and callus.   Neurological: She is alert.   Skin: Skin is warm. Capillary refill takes more than 3 seconds. There is erythema.   Psychiatric: She has a normal mood and affect. Her behavior is normal. Judgment and thought content normal.   Nursing note and vitals reviewed.                      Assessment:       1. Type 1 diabetes mellitus with diabetic neuropathy    2. Skin ulcer of left foot with fat layer exposed    3. Osteoarthritis of ankle and foot, unspecified laterality        Plan:       Patient presents today for follow-up of an ulceration on the plantar surface of the patient's left hallux.    Patient was advised the wound itself looks considerably better since I saw her last week it is still approximately 1.0 cm long by 0.5 cm wide however it is not as deep as it had been it has filled in some it is more of a superficial ulcer at this time I have advised the patient we need to make sure we get the infection completely resolved before we focus on the healing part of this treatment. Following non excisional debridement patient advised the area continues to look very good there is just a linear opening this still remains we do need to offload the pressure area better some going to dispense the patient some horseshoe pads she is going to clean the area at each time of the dressing change every 24 hr.   Patient advised since the wound looks so much better and has improved dramatically I want make sure there were offloading pressure from the area but we also need to settle down the inflammation surrounding the bone spurs the previous ulceration I have   dispensed the patient Flector patches she is going to  cut these to size place this over the area and wear this during the day taking it off at night to ensure that it does not become macerated she may need to put a little Coban around the area to keep it in place on the patient's left hallux.  Plan follow-up will be 4 weeks any problems questions or concerns she is to contact me..  Total face-to-face time including discussion evaluation non excisional debridement and wound care equaled 20 min.    Complete diabetic evaluation performed.  I did discuss surgical excision of this soft tissue mass and scar tissue that is present that is continuing to maintain this ulceration I have advised the patient to think about this carefully she would likely have to non be weight-bearing for least 4 weeks following this type of procedure. Patient recently lost the toenail 5th digit right she does not recall injuring or traumatizing the area she states the nail just fell off there is no signs of infection in this area it was just thoroughly cleaned and this needs to be monitored following debridement of the ulceration left hallux I am going to have the patient continue to clean the area with Dakin solution and applying Medihoney as directed.  Patient has been experiencing some pain in her right shin she states that she felt a popping sensation it has been sore and painful but it has gotten better I have advised her we will monitor this as long as it is getting better will monitor it if it gets worse more red more swollen she may need an MRI but does not relate any significant injury that would lead to mid believe that she has a tendon rupture.  Plan follow-up will be about 2 months the patient is not ready to surgically address the wound on the bottom of the left foot so we need to monitor this and keep it infection-free.  Excisional debridement performed as documented.  This note was created using [employer] voice recognition software that occasionally misinterpreted phrases or words.

## 2020-01-22 ENCOUNTER — TELEPHONE (OUTPATIENT)
Dept: PODIATRY | Facility: CLINIC | Age: 70
End: 2020-01-22

## 2020-01-22 DIAGNOSIS — L97.522 SKIN ULCER OF LEFT FOOT WITH FAT LAYER EXPOSED: Primary | ICD-10-CM

## 2020-01-22 NOTE — TELEPHONE ENCOUNTER
----- Message from Danita Gonzalez sent at 1/22/2020  8:54 AM CST -----  Contact: Pt  Type: Needs Medical Advice    Who Called:  Pt  Best Call Back Number: [phone]  Additional Information: Requesting a call back regarding scheduling a procedure for her toe   Please Advise ---Thank you

## 2020-02-04 ENCOUNTER — OFFICE VISIT (OUTPATIENT)
Dept: PODIATRY | Facility: CLINIC | Age: 70
End: 2020-02-04
Payer: MEDICARE

## 2020-02-04 ENCOUNTER — TELEPHONE (OUTPATIENT)
Dept: PODIATRY | Facility: CLINIC | Age: 70
End: 2020-02-04

## 2020-02-04 ENCOUNTER — LAB VISIT (OUTPATIENT)
Dept: LAB | Facility: HOSPITAL | Age: 70
End: 2020-02-04
Attending: PODIATRIST
Payer: MEDICARE

## 2020-02-04 VITALS
DIASTOLIC BLOOD PRESSURE: 77 MMHG | HEART RATE: 71 BPM | SYSTOLIC BLOOD PRESSURE: 148 MMHG | TEMPERATURE: 98 F | WEIGHT: 253 LBS | BODY MASS INDEX: 43.19 KG/M2 | HEIGHT: 64 IN

## 2020-02-04 DIAGNOSIS — S96.912S: ICD-10-CM

## 2020-02-04 DIAGNOSIS — R26.81 UNSTEADY GAIT: ICD-10-CM

## 2020-02-04 DIAGNOSIS — E10.40 TYPE 1 DIABETES MELLITUS WITH DIABETIC NEUROPATHY: ICD-10-CM

## 2020-02-04 DIAGNOSIS — M19.072 OSTEOARTHRITIS OF LEFT ANKLE AND FOOT: ICD-10-CM

## 2020-02-04 DIAGNOSIS — Z01.818 PRE-OP EXAM: ICD-10-CM

## 2020-02-04 DIAGNOSIS — R22.42 MASS OF LEFT FOOT: ICD-10-CM

## 2020-02-04 DIAGNOSIS — E10.40 TYPE 1 DIABETES MELLITUS WITH DIABETIC NEUROPATHY: Primary | ICD-10-CM

## 2020-02-04 LAB
ALBUMIN SERPL BCP-MCNC: 4.1 G/DL (ref 3.5–5.2)
ALP SERPL-CCNC: 44 U/L (ref 55–135)
ALT SERPL W/O P-5'-P-CCNC: 25 U/L (ref 10–44)
ANION GAP SERPL CALC-SCNC: 11 MMOL/L (ref 8–16)
AST SERPL-CCNC: 30 U/L (ref 10–40)
BASOPHILS # BLD AUTO: 0.04 K/UL (ref 0–0.2)
BASOPHILS NFR BLD: 0.7 % (ref 0–1.9)
BILIRUB SERPL-MCNC: 0.5 MG/DL (ref 0.1–1)
BUN SERPL-MCNC: 34 MG/DL (ref 8–23)
CALCIUM SERPL-MCNC: 9.3 MG/DL (ref 8.7–10.5)
CHLORIDE SERPL-SCNC: 105 MMOL/L (ref 95–110)
CO2 SERPL-SCNC: 28 MMOL/L (ref 23–29)
CREAT SERPL-MCNC: 0.7 MG/DL (ref 0.5–1.4)
DIFFERENTIAL METHOD: ABNORMAL
EOSINOPHIL # BLD AUTO: 0.1 K/UL (ref 0–0.5)
EOSINOPHIL NFR BLD: 1.6 % (ref 0–8)
ERYTHROCYTE [DISTWIDTH] IN BLOOD BY AUTOMATED COUNT: 16 % (ref 11.5–14.5)
EST. GFR  (AFRICAN AMERICAN): >60 ML/MIN/1.73 M^2
EST. GFR  (NON AFRICAN AMERICAN): >60 ML/MIN/1.73 M^2
GLUCOSE SERPL-MCNC: 120 MG/DL (ref 70–110)
HCT VFR BLD AUTO: 43 % (ref 37–48.5)
HGB BLD-MCNC: 13 G/DL (ref 12–16)
IMM GRANULOCYTES # BLD AUTO: 0.06 K/UL (ref 0–0.04)
IMM GRANULOCYTES NFR BLD AUTO: 1.1 % (ref 0–0.5)
LYMPHOCYTES # BLD AUTO: 1.4 K/UL (ref 1–4.8)
LYMPHOCYTES NFR BLD: 25.1 % (ref 18–48)
MCH RBC QN AUTO: 25.3 PG (ref 27–31)
MCHC RBC AUTO-ENTMCNC: 30.2 G/DL (ref 32–36)
MCV RBC AUTO: 84 FL (ref 82–98)
MONOCYTES # BLD AUTO: 0.4 K/UL (ref 0.3–1)
MONOCYTES NFR BLD: 7.6 % (ref 4–15)
NEUTROPHILS # BLD AUTO: 3.6 K/UL (ref 1.8–7.7)
NEUTROPHILS NFR BLD: 63.9 % (ref 38–73)
NRBC BLD-RTO: 0 /100 WBC
PLATELET # BLD AUTO: 319 K/UL (ref 150–350)
PMV BLD AUTO: 9.5 FL (ref 9.2–12.9)
POTASSIUM SERPL-SCNC: 4.4 MMOL/L (ref 3.5–5.1)
PROT SERPL-MCNC: 7.5 G/DL (ref 6–8.4)
RBC # BLD AUTO: 5.13 M/UL (ref 4–5.4)
SODIUM SERPL-SCNC: 144 MMOL/L (ref 136–145)
WBC # BLD AUTO: 5.69 K/UL (ref 3.9–12.7)

## 2020-02-04 PROCEDURE — 3078F PR MOST RECENT DIASTOLIC BLOOD PRESSURE < 80 MM HG: ICD-10-PCS | Mod: CPTII,S$GLB,, | Performed by: PODIATRIST

## 2020-02-04 PROCEDURE — 1126F AMNT PAIN NOTED NONE PRSNT: CPT | Mod: S$GLB,,, | Performed by: PODIATRIST

## 2020-02-04 PROCEDURE — 80053 COMPREHEN METABOLIC PANEL: CPT

## 2020-02-04 PROCEDURE — 1126F PR PAIN SEVERITY QUANTIFIED, NO PAIN PRESENT: ICD-10-PCS | Mod: S$GLB,,, | Performed by: PODIATRIST

## 2020-02-04 PROCEDURE — 99215 OFFICE O/P EST HI 40 MIN: CPT | Mod: S$GLB,,, | Performed by: PODIATRIST

## 2020-02-04 PROCEDURE — 99999 PR PBB SHADOW E&M-EST. PATIENT-LVL III: CPT | Mod: PBBFAC,,, | Performed by: PODIATRIST

## 2020-02-04 PROCEDURE — 3077F SYST BP >= 140 MM HG: CPT | Mod: CPTII,S$GLB,, | Performed by: PODIATRIST

## 2020-02-04 PROCEDURE — 1101F PR PT FALLS ASSESS DOC 0-1 FALLS W/OUT INJ PAST YR: ICD-10-PCS | Mod: CPTII,S$GLB,, | Performed by: PODIATRIST

## 2020-02-04 PROCEDURE — 1159F MED LIST DOCD IN RCRD: CPT | Mod: S$GLB,,, | Performed by: PODIATRIST

## 2020-02-04 PROCEDURE — 3077F PR MOST RECENT SYSTOLIC BLOOD PRESSURE >= 140 MM HG: ICD-10-PCS | Mod: CPTII,S$GLB,, | Performed by: PODIATRIST

## 2020-02-04 PROCEDURE — 99215 PR OFFICE/OUTPT VISIT, EST, LEVL V, 40-54 MIN: ICD-10-PCS | Mod: S$GLB,,, | Performed by: PODIATRIST

## 2020-02-04 PROCEDURE — 36415 COLL VENOUS BLD VENIPUNCTURE: CPT

## 2020-02-04 PROCEDURE — 1101F PT FALLS ASSESS-DOCD LE1/YR: CPT | Mod: CPTII,S$GLB,, | Performed by: PODIATRIST

## 2020-02-04 PROCEDURE — 1159F PR MEDICATION LIST DOCUMENTED IN MEDICAL RECORD: ICD-10-PCS | Mod: S$GLB,,, | Performed by: PODIATRIST

## 2020-02-04 PROCEDURE — 99999 PR PBB SHADOW E&M-EST. PATIENT-LVL III: ICD-10-PCS | Mod: PBBFAC,,, | Performed by: PODIATRIST

## 2020-02-04 PROCEDURE — 85025 COMPLETE CBC W/AUTO DIFF WBC: CPT

## 2020-02-04 PROCEDURE — 3078F DIAST BP <80 MM HG: CPT | Mod: CPTII,S$GLB,, | Performed by: PODIATRIST

## 2020-02-04 RX ORDER — ONDANSETRON HYDROCHLORIDE 8 MG/1
8 TABLET, FILM COATED ORAL EVERY 8 HOURS PRN
Qty: 42 TABLET | Refills: 1 | Status: SHIPPED | OUTPATIENT
Start: 2020-02-04 | End: 2020-02-18

## 2020-02-04 RX ORDER — SULFAMETHOXAZOLE AND TRIMETHOPRIM 400; 80 MG/1; MG/1
2 TABLET ORAL 2 TIMES DAILY
Qty: 56 TABLET | Refills: 0 | Status: SHIPPED | OUTPATIENT
Start: 2020-02-04 | End: 2020-02-18

## 2020-02-04 NOTE — LETTER
February 4, 2020      Jose Mary MD  [address] [facility] [place] [address]           [facility] - Podiatry/Wound Care  [facility] [facility] [address]  Phone: [phone]  Fax: [phone]          Patient: Marifer Bernardo   MR Number: [id number]   YOB: 1950   Date of Visit: 2/4/2020       Dear Dr. Jose Mary:    Thank you for referring Marifer Bernardo to me for evaluation. Attached you will find relevant portions of my assessment and plan of care.    If you have questions, please do not hesitate to call me. I look forward to following Marifer Bernardo along with you.    Sincerely,    Osvaldo Arreola, DPRICK    Enclosure  CC:  No Recipients    If you would like to receive this communication electronically, please contact [email or web address] or [phone] to request more information on [employer] Link access.    For providers and/or their staff who would like to refer a patient to [facility], please contact us through our one-stop-shop provider referral line, [facility], at [phone].    If you feel you have received this communication in error or would no longer like to receive these types of communications, please e-mail [email or web address]

## 2020-02-04 NOTE — H&P (VIEW-ONLY)
Subjective:       Patient ID: Marifer Bernardo is a 69 y.o. female.    Chief Complaint: Follow-up; Diabetes Mellitus; and Foot Problem  patient presents today she has been experiencing swelling in her left foot she also has a chronic ulceration on the bottom of her left great toe patient relates that she had been seeing Dr. Nelson before he retired for a callus on her left big toe she states she subsequently saw Dr. Clemons in November of 2018 Dr. Clemons did surgery to cut the tendon on the bottom of her left big toe ever since that time she has had a wound in this area that has drained from time to time and has never completely healed. Patient is a type 1 diabetic times 20 years.  Patient states the wound has been present and affecting the way she walks ever since Dr. Clemons did surgery.  Follow-up   Associated symptoms include arthralgias, joint swelling and numbness.   Nail Problem   Associated symptoms include arthralgias, joint swelling and numbness.   Foot Ulcer   Associated symptoms include arthralgias, joint swelling and numbness.      Review of Systems   Musculoskeletal: Positive for arthralgias, gait problem and joint swelling.   Neurological: Positive for numbness.   All other systems reviewed and are negative.      Objective:      Physical Exam   Constitutional: She appears well-developed and well-nourished.   Cardiovascular: Normal rate, regular rhythm and normal heart sounds.   Pulses:       Dorsalis pedis pulses are 1+ on the right side, and 1+ on the left side.        Posterior tibial pulses are 1+ on the right side, and 1+ on the left side.   Pulmonary/Chest: Effort normal and breath sounds normal.   Musculoskeletal: Normal range of motion. She exhibits edema, tenderness and deformity.        Left foot: There is deformity.        Feet:    Feet:   Right Foot:   Protective Sensation: 4 sites tested. 3 sites sensed.   Left Foot:   Protective Sensation: 4 sites tested. 3 sites sensed.   Skin Integrity:  Positive for ulcer, skin breakdown, erythema and callus.   Neurological: She is alert.   Skin: Skin is warm. Capillary refill takes more than 3 seconds. There is erythema.   Psychiatric: She has a normal mood and affect. Her behavior is normal. Judgment and thought content normal.   Nursing note and vitals reviewed.            CBC auto differential   Order: [id number]   Status:  Final result   Visible to patient:  No (Not Released) Next appt:  02/11/2020 at 02:30 PM in Podiatry (Osvaldo Arreola DPM) Dx:  Unsteady gait; Type 1 diabetes mellit...    Ref Range & Units 1d ago   WBC 3.90 - 12.70 K/uL 5.69    RBC 4.00 - 5.40 M/uL 5.13    Hemoglobin 12.0 - 16.0 g/dL 13.0    Hematocrit 37.0 - 48.5 % 43.0    Mean Corpuscular Volume 82 - 98 fL 84    Mean Corpuscular Hemoglobin 27.0 - 31.0 pg 25.3Low     Mean Corpuscular Hemoglobin Conc 32.0 - 36.0 g/dL 30.2Low     RDW 11.5 - 14.5 % 16.0High     Platelets 150 - 350 K/uL 319    MPV 9.2 - 12.9 fL 9.5    Immature Granulocytes 0.0 - 0.5 % 1.1High     Gran # (ANC) 1.8 - 7.7 K/uL 3.6    Immature Grans (Abs) 0.00 - 0.04 K/uL 0.06High     Comment: Mild elevation in immature granulocytes is non specific and   can be seen in a variety of conditions including stress response,   acute inflammation, trauma and pregnancy. Correlation with other   laboratory and clinical findings is essential.    Lymph # 1.0 - 4.8 K/uL 1.4    Mono # 0.3 - 1.0 K/uL 0.4    Eos # 0.0 - 0.5 K/uL 0.1    Baso # 0.00 - 0.20 K/uL 0.04    nRBC 0 /100 WBC 0    Gran% 38.0 - 73.0 % 63.9    Lymph% 18.0 - 48.0 % 25.1    Mono% 4.0 - 15.0 % 7.6    Eosinophil% 0.0 - 8.0 % 1.6    Basophil% 0.0 - 1.9 % 0.7    Differential Method  Automated    [facility]         Specimen Collected: 02/04/20 13:22 Last Resulted: 02/04/20 17:14           Comprehensive metabolic panel   Order: [id number]   Status:  Final result   Visible to patient:  No (Not Released)   Next appt:  02/11/2020 at 02:30 PM in Podiatry (Osvaldo KNIGHT  CARLOS Arreola)   Dx:  Unsteady gait; Type 1 diabetes mellit...    Ref Range & Units 1d ago   Sodium 136 - 145 mmol/L 144    Potassium 3.5 - 5.1 mmol/L 4.4    Chloride 95 - 110 mmol/L 105    CO2 23 - 29 mmol/L 28    Glucose 70 - 110 mg/dL 120High     BUN, Bld 8 - 23 mg/dL 34High     Creatinine 0.5 - 1.4 mg/dL 0.7    Calcium 8.7 - 10.5 mg/dL 9.3    Total Protein 6.0 - 8.4 g/dL 7.5    Albumin 3.5 - 5.2 g/dL 4.1    Total Bilirubin 0.1 - 1.0 mg/dL 0.5    Comment: For infants and newborns, interpretation of results should be based   on gestational age, weight and in agreement with clinical   observations.   Premature Infant recommended reference ranges:   Up to 24 hours.............<8.0 mg/dL   Up to 48 hours............<12.0 mg/dL   3-5 days..................<15.0 mg/dL   6-29 days.................<15.0 mg/dL    Alkaline Phosphatase 55 - 135 U/L 44Low     AST 10 - 40 U/L 30    ALT 10 - 44 U/L 25    Anion Gap 8 - 16 mmol/L 11    eGFR if African American >60 mL/min/1.73 m^2 >60.0    eGFR if non African American >60 mL/min/1.73 m^2 >60.0    Comment: Calculation used to obtain the estimated glomerular filtration   rate (eGFR) is the CKD-EPI equation.    [facility]  [facility]         Specimen Collected: 02/04/20 16:38 Last Resulted: 02/04/20 17:01                     Assessment:       1. Type 1 diabetes mellitus with diabetic neuropathy    2. Mass of left foot    3. Osteoarthritis of left ankle and foot    4. Tendon tear, foot, left, sequela    5. Pre-op exam    6. Unsteady gait        Plan:       Patient presents today for follow-up of an ulceration on the plantar surface of the patient's left hallux.   Patient indicated at this time that she would like to have something surgically done she states this chronic ulceration is been difficult for her to get around she cannot go for a walk without having some degree of discomfort this area constantly breaks open in bleeds.  I have treated this area conservatively there has  been signs of improvement although the ulcer is never completely healed the patient's left hallux is in a dorsiflex position since the flexor hallucis longus tendon has been transected this is certainly a contributing factor to the chronic nature of this ulceration in failure to heal completely.  We have kept this area completely infection-free for an extended period of time following her care she has been cleaning it every day changing the dressing every day as directed and following my instructions.  Following evaluation and discussion I did discuss with the patient surgical excision of the scar tissue soft tissue mass from the area repair of the flexor hallucis longus tendon if possible I have advised the patient if I am able to visualize the tendon I will make an attempt to repair it I have also advised her if there is any bone spurs in the area I will debride the bone spurs and attempt primary closure following excision of the mass.  Patient was in understanding and agreement with this today decision for surgery was made today.Patient presents today for preoperative history and physical in discussion all aspects of surgery were discussed with the patient no guarantees were given written or implied all potential complications including but not limited to delayed healing nonhealing postoperative pain infection recurrence were discussed with the patient in detail. All aspect of the patient's recovery time involved in recovery patient responsibility is involving recovery were also discussed in detail. Patient advised failure to comply with postoperative care will jeopardize surgical outcome.  All aspects of surgical intervention were discussed at length and in detail with the patient today patient understands her weight-bearing is going to be very limited for 4-6 weeks while this area heels I have recommended her to be nonweightbearing as much as possible I have ordered the patient a wheelchair she already has a  handicap sticker I have also ordered her a fracture boot and plan on putting her in the fracture boot immediately following surgery.  Patient indicated she does not want any pain medication after surgery she states it does not react well with her she is going to continue to take her diclofenac for inflammation she will ice and elevate the area I did give her a prescription for Bactrim for put postoperative prophylaxis against infection as well as Zofran as needed for postoperative nausea preoperative lab work has been ordered will be evaluated prior to surgery and as previously stated I have ordered the patient a fracture boot and a wheelchair.  Patient's surgery is scheduled for February 7, 2020 she will and be NPO after midnight the patient is diabetic she is going to hold off taking her diabetic medication the morning of surgery we will check her glucose in the morning of surgery.  Total face-to-face time including discussion evaluation treatment preoperative evaluation surgical consultation and decision for surgery today equaled 60 min.This note was created using [employer] voice recognition software that occasionally misinterpreted phrases or words.

## 2020-02-04 NOTE — PROGRESS NOTES
Subjective:       Patient ID: Marifer Bernardo is a 69 y.o. female.    Chief Complaint: Follow-up; Diabetes Mellitus; and Foot Problem  patient presents today she has been experiencing swelling in her left foot she also has a chronic ulceration on the bottom of her left great toe patient relates that she had been seeing Dr. Nelson before he retired for a callus on her left big toe she states she subsequently saw Dr. Clemons in November of 2018 Dr. Clemons did surgery to cut the tendon on the bottom of her left big toe ever since that time she has had a wound in this area that has drained from time to time and has never completely healed. Patient is a type 1 diabetic times 20 years.  Patient states the wound has been present and affecting the way she walks ever since Dr. Clemons did surgery.  Follow-up   Associated symptoms include arthralgias, joint swelling and numbness.   Nail Problem   Associated symptoms include arthralgias, joint swelling and numbness.   Foot Ulcer   Associated symptoms include arthralgias, joint swelling and numbness.      Review of Systems   Musculoskeletal: Positive for arthralgias, gait problem and joint swelling.   Neurological: Positive for numbness.   All other systems reviewed and are negative.      Objective:      Physical Exam   Constitutional: She appears well-developed and well-nourished.   Cardiovascular: Normal rate, regular rhythm and normal heart sounds.   Pulses:       Dorsalis pedis pulses are 1+ on the right side, and 1+ on the left side.        Posterior tibial pulses are 1+ on the right side, and 1+ on the left side.   Pulmonary/Chest: Effort normal and breath sounds normal.   Musculoskeletal: Normal range of motion. She exhibits edema, tenderness and deformity.        Left foot: There is deformity.        Feet:    Feet:   Right Foot:   Protective Sensation: 4 sites tested. 3 sites sensed.   Left Foot:   Protective Sensation: 4 sites tested. 3 sites sensed.   Skin Integrity:  Positive for ulcer, skin breakdown, erythema and callus.   Neurological: She is alert.   Skin: Skin is warm. Capillary refill takes more than 3 seconds. There is erythema.   Psychiatric: She has a normal mood and affect. Her behavior is normal. Judgment and thought content normal.   Nursing note and vitals reviewed.            CBC auto differential   Order: [id number]   Status:  Final result   Visible to patient:  No (Not Released) Next appt:  02/11/2020 at 02:30 PM in Podiatry (Osvaldo Arreola DPM) Dx:  Unsteady gait; Type 1 diabetes mellit...    Ref Range & Units 1d ago   WBC 3.90 - 12.70 K/uL 5.69    RBC 4.00 - 5.40 M/uL 5.13    Hemoglobin 12.0 - 16.0 g/dL 13.0    Hematocrit 37.0 - 48.5 % 43.0    Mean Corpuscular Volume 82 - 98 fL 84    Mean Corpuscular Hemoglobin 27.0 - 31.0 pg 25.3Low     Mean Corpuscular Hemoglobin Conc 32.0 - 36.0 g/dL 30.2Low     RDW 11.5 - 14.5 % 16.0High     Platelets 150 - 350 K/uL 319    MPV 9.2 - 12.9 fL 9.5    Immature Granulocytes 0.0 - 0.5 % 1.1High     Gran # (ANC) 1.8 - 7.7 K/uL 3.6    Immature Grans (Abs) 0.00 - 0.04 K/uL 0.06High     Comment: Mild elevation in immature granulocytes is non specific and   can be seen in a variety of conditions including stress response,   acute inflammation, trauma and pregnancy. Correlation with other   laboratory and clinical findings is essential.    Lymph # 1.0 - 4.8 K/uL 1.4    Mono # 0.3 - 1.0 K/uL 0.4    Eos # 0.0 - 0.5 K/uL 0.1    Baso # 0.00 - 0.20 K/uL 0.04    nRBC 0 /100 WBC 0    Gran% 38.0 - 73.0 % 63.9    Lymph% 18.0 - 48.0 % 25.1    Mono% 4.0 - 15.0 % 7.6    Eosinophil% 0.0 - 8.0 % 1.6    Basophil% 0.0 - 1.9 % 0.7    Differential Method  Automated    [facility]         Specimen Collected: 02/04/20 13:22 Last Resulted: 02/04/20 17:14           Comprehensive metabolic panel   Order: [id number]   Status:  Final result   Visible to patient:  No (Not Released)   Next appt:  02/11/2020 at 02:30 PM in Podiatry (Osvaldo KNIGHT  CARLOS Arroela)   Dx:  Unsteady gait; Type 1 diabetes mellit...    Ref Range & Units 1d ago   Sodium 136 - 145 mmol/L 144    Potassium 3.5 - 5.1 mmol/L 4.4    Chloride 95 - 110 mmol/L 105    CO2 23 - 29 mmol/L 28    Glucose 70 - 110 mg/dL 120High     BUN, Bld 8 - 23 mg/dL 34High     Creatinine 0.5 - 1.4 mg/dL 0.7    Calcium 8.7 - 10.5 mg/dL 9.3    Total Protein 6.0 - 8.4 g/dL 7.5    Albumin 3.5 - 5.2 g/dL 4.1    Total Bilirubin 0.1 - 1.0 mg/dL 0.5    Comment: For infants and newborns, interpretation of results should be based   on gestational age, weight and in agreement with clinical   observations.   Premature Infant recommended reference ranges:   Up to 24 hours.............<8.0 mg/dL   Up to 48 hours............<12.0 mg/dL   3-5 days..................<15.0 mg/dL   6-29 days.................<15.0 mg/dL    Alkaline Phosphatase 55 - 135 U/L 44Low     AST 10 - 40 U/L 30    ALT 10 - 44 U/L 25    Anion Gap 8 - 16 mmol/L 11    eGFR if African American >60 mL/min/1.73 m^2 >60.0    eGFR if non African American >60 mL/min/1.73 m^2 >60.0    Comment: Calculation used to obtain the estimated glomerular filtration   rate (eGFR) is the CKD-EPI equation.    [facility]  [facility]         Specimen Collected: 02/04/20 16:38 Last Resulted: 02/04/20 17:01                     Assessment:       1. Type 1 diabetes mellitus with diabetic neuropathy    2. Mass of left foot    3. Osteoarthritis of left ankle and foot    4. Tendon tear, foot, left, sequela    5. Pre-op exam    6. Unsteady gait        Plan:       Patient presents today for follow-up of an ulceration on the plantar surface of the patient's left hallux.   Patient indicated at this time that she would like to have something surgically done she states this chronic ulceration is been difficult for her to get around she cannot go for a walk without having some degree of discomfort this area constantly breaks open in bleeds.  I have treated this area conservatively there has  been signs of improvement although the ulcer is never completely healed the patient's left hallux is in a dorsiflex position since the flexor hallucis longus tendon has been transected this is certainly a contributing factor to the chronic nature of this ulceration in failure to heal completely.  We have kept this area completely infection-free for an extended period of time following her care she has been cleaning it every day changing the dressing every day as directed and following my instructions.  Following evaluation and discussion I did discuss with the patient surgical excision of the scar tissue soft tissue mass from the area repair of the flexor hallucis longus tendon if possible I have advised the patient if I am able to visualize the tendon I will make an attempt to repair it I have also advised her if there is any bone spurs in the area I will debride the bone spurs and attempt primary closure following excision of the mass.  Patient was in understanding and agreement with this today decision for surgery was made today.Patient presents today for preoperative history and physical in discussion all aspects of surgery were discussed with the patient no guarantees were given written or implied all potential complications including but not limited to delayed healing nonhealing postoperative pain infection recurrence were discussed with the patient in detail. All aspect of the patient's recovery time involved in recovery patient responsibility is involving recovery were also discussed in detail. Patient advised failure to comply with postoperative care will jeopardize surgical outcome.  All aspects of surgical intervention were discussed at length and in detail with the patient today patient understands her weight-bearing is going to be very limited for 4-6 weeks while this area heels I have recommended her to be nonweightbearing as much as possible I have ordered the patient a wheelchair she already has a  handicap sticker I have also ordered her a fracture boot and plan on putting her in the fracture boot immediately following surgery.  Patient indicated she does not want any pain medication after surgery she states it does not react well with her she is going to continue to take her diclofenac for inflammation she will ice and elevate the area I did give her a prescription for Bactrim for put postoperative prophylaxis against infection as well as Zofran as needed for postoperative nausea preoperative lab work has been ordered will be evaluated prior to surgery and as previously stated I have ordered the patient a fracture boot and a wheelchair.  Patient's surgery is scheduled for February 7, 2020 she will and be NPO after midnight the patient is diabetic she is going to hold off taking her diabetic medication the morning of surgery we will check her glucose in the morning of surgery.  Total face-to-face time including discussion evaluation treatment preoperative evaluation surgical consultation and decision for surgery today equaled 60 min.This note was created using [employer] voice recognition software that occasionally misinterpreted phrases or words.

## 2020-02-04 NOTE — TELEPHONE ENCOUNTER
----- Message from Elli Shirley sent at 2/4/2020  4:55 PM CST -----  Contact: self [phone]  Patient is requesting a call back from the nurse stated she need to know where can she find a boot for her foot.  Patient has [employer] insurance. Patient stated [facility] has the boot but they do not take her insurance and the cost will be $140.  Patient stated [facility] do not carry the boot any longer.    Please call the patient upon request at phone number [phone].

## 2020-02-04 NOTE — PATIENT INSTRUCTIONS
Nothing to eat or drink after midnight.  If you take medication for high blood pressure take this in the morning with a sip of water prior to surgery.     Arrive at out patient registration at 8:00 am

## 2020-02-05 RX ORDER — SODIUM CHLORIDE, SODIUM LACTATE, POTASSIUM CHLORIDE, CALCIUM CHLORIDE 600; 310; 30; 20 MG/100ML; MG/100ML; MG/100ML; MG/100ML
INJECTION, SOLUTION INTRAVENOUS CONTINUOUS
Status: CANCELLED | OUTPATIENT
Start: 2020-02-07

## 2020-02-06 ENCOUNTER — TELEPHONE (OUTPATIENT)
Dept: PODIATRY | Facility: CLINIC | Age: 70
End: 2020-02-06

## 2020-02-06 NOTE — TELEPHONE ENCOUNTER
Pt was given contact information to [facility], advised her order was sent yesterday morning. Pt is going to contact [facility]. If there are any issues she will call the office

## 2020-02-06 NOTE — TELEPHONE ENCOUNTER
----- Message from Shravan Menon sent at 2/6/2020  8:28 AM CST -----  Type: Needs Medical Advice    Who Called:  Self   Symptoms (please be specific):  NA  How long has patient had these symptoms:  NA  Pharmacy name and phone #:  Best Call Back Number: [phone] Information: Patient is schedule for foot surgery tomorrow. Patient called asking for the status for air cast walker boot.

## 2020-02-07 ENCOUNTER — ANESTHESIA EVENT (OUTPATIENT)
Dept: SURGERY | Facility: HOSPITAL | Age: 70
End: 2020-02-07
Payer: MEDICARE

## 2020-02-07 ENCOUNTER — ANESTHESIA (OUTPATIENT)
Dept: SURGERY | Facility: HOSPITAL | Age: 70
End: 2020-02-07
Payer: MEDICARE

## 2020-02-07 ENCOUNTER — HOSPITAL ENCOUNTER (OUTPATIENT)
Facility: HOSPITAL | Age: 70
Discharge: HOME | End: 2020-02-07
Attending: PODIATRIST | Admitting: PODIATRIST
Payer: MEDICARE

## 2020-02-07 DIAGNOSIS — Z01.818 PRE-OP EXAM: ICD-10-CM

## 2020-02-07 DIAGNOSIS — S96.912S: ICD-10-CM

## 2020-02-07 DIAGNOSIS — M19.072 OSTEOARTHRITIS OF LEFT ANKLE AND FOOT: ICD-10-CM

## 2020-02-07 DIAGNOSIS — E10.40 TYPE 1 DIABETES MELLITUS WITH DIABETIC NEUROPATHY: ICD-10-CM

## 2020-02-07 DIAGNOSIS — R22.42 MASS OF LEFT FOOT: ICD-10-CM

## 2020-02-07 DIAGNOSIS — R26.81 UNSTEADY GAIT: ICD-10-CM

## 2020-02-07 DIAGNOSIS — L97.522 SKIN ULCER OF LEFT FOOT WITH FAT LAYER EXPOSED: ICD-10-CM

## 2020-02-07 LAB
POCT GLUCOSE: 121 MG/DL (ref 70–110)
POCT GLUCOSE: 123 MG/DL (ref 70–110)

## 2020-02-07 PROCEDURE — 88307 TISSUE EXAM BY PATHOLOGIST: CPT | Performed by: PATHOLOGY

## 2020-02-07 PROCEDURE — D9220A PRA ANESTHESIA: ICD-10-PCS | Mod: ANES,,, | Performed by: ANESTHESIOLOGY

## 2020-02-07 PROCEDURE — 63600175 PHARM REV CODE 636 W HCPCS: Performed by: NURSE ANESTHETIST, CERTIFIED REGISTERED

## 2020-02-07 PROCEDURE — 36000709 HC OR TIME LEV III EA ADD 15 MIN: Performed by: PODIATRIST

## 2020-02-07 PROCEDURE — S0077 INJECTION, CLINDAMYCIN PHOSP: HCPCS | Performed by: PODIATRIST

## 2020-02-07 PROCEDURE — 28041 EXC FOOT/TOE TUM DEP 1.5CM/>: CPT | Mod: 51,LT,, | Performed by: PODIATRIST

## 2020-02-07 PROCEDURE — D9220A PRA ANESTHESIA: ICD-10-PCS | Mod: CRNA,,, | Performed by: NURSE ANESTHETIST, CERTIFIED REGISTERED

## 2020-02-07 PROCEDURE — 71000033 HC RECOVERY, INTIAL HOUR: Performed by: PODIATRIST

## 2020-02-07 PROCEDURE — C1713 ANCHOR/SCREW BN/BN,TIS/BN: HCPCS | Performed by: PODIATRIST

## 2020-02-07 PROCEDURE — 28041 PR EXC TUMOR SOFT TISSUE FOOT/TOE SUBFASC 1.5+CM: ICD-10-PCS | Mod: 51,LT,, | Performed by: PODIATRIST

## 2020-02-07 PROCEDURE — 71000015 HC POSTOP RECOV 1ST HR: Performed by: PODIATRIST

## 2020-02-07 PROCEDURE — 63600175 PHARM REV CODE 636 W HCPCS: Performed by: PODIATRIST

## 2020-02-07 PROCEDURE — 37000008 HC ANESTHESIA 1ST 15 MINUTES: Performed by: PODIATRIST

## 2020-02-07 PROCEDURE — 88307 PR  SURG PATH,LEVEL V: ICD-10-PCS | Mod: 26,,, | Performed by: PATHOLOGY

## 2020-02-07 PROCEDURE — D9220A PRA ANESTHESIA: Mod: CRNA,,, | Performed by: NURSE ANESTHETIST, CERTIFIED REGISTERED

## 2020-02-07 PROCEDURE — 28202 REPAIR/GRAFT OF FOOT TENDON: CPT | Mod: LT,,, | Performed by: PODIATRIST

## 2020-02-07 PROCEDURE — 25000003 PHARM REV CODE 250: Performed by: PODIATRIST

## 2020-02-07 PROCEDURE — 37000009 HC ANESTHESIA EA ADD 15 MINS: Performed by: PODIATRIST

## 2020-02-07 PROCEDURE — 88307 TISSUE EXAM BY PATHOLOGIST: CPT | Mod: 26,,, | Performed by: PATHOLOGY

## 2020-02-07 PROCEDURE — S0020 INJECTION, BUPIVICAINE HYDRO: HCPCS | Performed by: PODIATRIST

## 2020-02-07 PROCEDURE — 28202 PR REPAIR/GRAFT FLEX FOOT TENDON: ICD-10-PCS | Mod: LT,,, | Performed by: PODIATRIST

## 2020-02-07 PROCEDURE — D9220A PRA ANESTHESIA: Mod: ANES,,, | Performed by: ANESTHESIOLOGY

## 2020-02-07 PROCEDURE — 36000708 HC OR TIME LEV III 1ST 15 MIN: Performed by: PODIATRIST

## 2020-02-07 DEVICE — ANCHOR SUT CORKSCREW: Type: IMPLANTABLE DEVICE | Site: FOOT | Status: FUNCTIONAL

## 2020-02-07 RX ORDER — SODIUM CHLORIDE, SODIUM LACTATE, POTASSIUM CHLORIDE, CALCIUM CHLORIDE 600; 310; 30; 20 MG/100ML; MG/100ML; MG/100ML; MG/100ML
INJECTION, SOLUTION INTRAVENOUS CONTINUOUS
Status: CANCELLED | OUTPATIENT
Start: 2020-02-07

## 2020-02-07 RX ORDER — SODIUM CHLORIDE, SODIUM LACTATE, POTASSIUM CHLORIDE, CALCIUM CHLORIDE 600; 310; 30; 20 MG/100ML; MG/100ML; MG/100ML; MG/100ML
INJECTION, SOLUTION INTRAVENOUS CONTINUOUS
Status: DISCONTINUED | OUTPATIENT
Start: 2020-02-07 | End: 2020-02-07 | Stop reason: HOSPADM

## 2020-02-07 RX ORDER — CLINDAMYCIN PHOSPHATE 600 MG/50ML
600 INJECTION, SOLUTION INTRAVENOUS
Status: COMPLETED | OUTPATIENT
Start: 2020-02-07 | End: 2020-02-07

## 2020-02-07 RX ORDER — LIDOCAINE HYDROCHLORIDE 10 MG/ML
INJECTION INFILTRATION; PERINEURAL
Status: DISCONTINUED | OUTPATIENT
Start: 2020-02-07 | End: 2020-02-07 | Stop reason: HOSPADM

## 2020-02-07 RX ORDER — SODIUM CHLORIDE, SODIUM LACTATE, POTASSIUM CHLORIDE, CALCIUM CHLORIDE 600; 310; 30; 20 MG/100ML; MG/100ML; MG/100ML; MG/100ML
125 INJECTION, SOLUTION INTRAVENOUS CONTINUOUS
Status: DISCONTINUED | OUTPATIENT
Start: 2020-02-07 | End: 2020-02-07 | Stop reason: HOSPADM

## 2020-02-07 RX ORDER — PROPOFOL 10 MG/ML
VIAL (ML) INTRAVENOUS
Status: DISCONTINUED | OUTPATIENT
Start: 2020-02-07 | End: 2020-02-07

## 2020-02-07 RX ORDER — ONDANSETRON 2 MG/ML
4 INJECTION INTRAMUSCULAR; INTRAVENOUS DAILY PRN
Status: DISCONTINUED | OUTPATIENT
Start: 2020-02-07 | End: 2020-02-07 | Stop reason: HOSPADM

## 2020-02-07 RX ORDER — LIDOCAINE HYDROCHLORIDE 10 MG/ML
1 INJECTION, SOLUTION EPIDURAL; INFILTRATION; INTRACAUDAL; PERINEURAL ONCE
Status: CANCELLED | OUTPATIENT
Start: 2020-02-07 | End: 2020-02-07

## 2020-02-07 RX ORDER — MIDAZOLAM HYDROCHLORIDE 1 MG/ML
INJECTION, SOLUTION INTRAMUSCULAR; INTRAVENOUS
Status: DISCONTINUED | OUTPATIENT
Start: 2020-02-07 | End: 2020-02-07

## 2020-02-07 RX ORDER — BUPIVACAINE HYDROCHLORIDE 5 MG/ML
INJECTION, SOLUTION EPIDURAL; INTRACAUDAL
Status: DISCONTINUED | OUTPATIENT
Start: 2020-02-07 | End: 2020-02-07 | Stop reason: HOSPADM

## 2020-02-07 RX ORDER — ASPIRIN 81 MG/1
81 TABLET ORAL DAILY
COMMUNITY

## 2020-02-07 RX ORDER — ONDANSETRON 2 MG/ML
INJECTION INTRAMUSCULAR; INTRAVENOUS
Status: DISCONTINUED | OUTPATIENT
Start: 2020-02-07 | End: 2020-02-07

## 2020-02-07 RX ADMIN — PROPOFOL 30 MG: 10 INJECTION, EMULSION INTRAVENOUS at 09:02

## 2020-02-07 RX ADMIN — ONDANSETRON 4 MG: 2 INJECTION INTRAMUSCULAR; INTRAVENOUS at 09:02

## 2020-02-07 RX ADMIN — PROPOFOL 30 MG: 10 INJECTION, EMULSION INTRAVENOUS at 10:02

## 2020-02-07 RX ADMIN — PROPOFOL 50 MG: 10 INJECTION, EMULSION INTRAVENOUS at 10:02

## 2020-02-07 RX ADMIN — PROPOFOL 50 MG: 10 INJECTION, EMULSION INTRAVENOUS at 09:02

## 2020-02-07 RX ADMIN — MIDAZOLAM HYDROCHLORIDE 2 MG: 1 INJECTION, SOLUTION INTRAMUSCULAR; INTRAVENOUS at 09:02

## 2020-02-07 RX ADMIN — CLINDAMYCIN IN 5 PERCENT DEXTROSE 600 MG: 12 INJECTION, SOLUTION INTRAVENOUS at 09:02

## 2020-02-07 RX ADMIN — SODIUM CHLORIDE, POTASSIUM CHLORIDE, SODIUM LACTATE AND CALCIUM CHLORIDE: 600; 310; 30; 20 INJECTION, SOLUTION INTRAVENOUS at 09:02

## 2020-02-07 NOTE — OP NOTE
[facility] - Periop Services  Operative Note     SUMMARY     Surgery Date: 2/7/2020       Pre-op Diagnosis:  Skin ulcer of left foot with fat layer exposed [L97.522] ruptured flexor hallucis longus left    Post-op Diagnosis:  Post-Op Diagnosis Codes:     * Skin ulcer of left foot with fat layer exposed [L97.522] ruptured flexor hallucis longus left    Procedure(s) (LRB):  EXCISION, MASS, FOOT (Left) 04240  Repair flexor hallucis longus tendon left 40824    Surgeon(s) and Role:     * Osvaldo Arreola, CARLOS - Primary      Estimated Blood Loss:  Minimal  Hemostasis:  Ankle tourniquet placed to 250 mm of mercury for a period of 40 min    Anesthesia:  Monitored sedation in conjunction with local infiltrate equaling 20 cc of 1% lidocaine plain  Materials sterile irrigant Arthrex 2.7 corkscrew tendon anchor 2.0 nylon  # 2  Nylon  Description of the findings of the procedure:  Skin ulcer of left foot with fat layer exposed [L97.522] ruptured flexor hallucis longus tendon left       Pathology soft tissue mass excised from the plantar surface of the patient's left hallux consistent with chronic ulceration approximately 2.5 cm long by 1 cm wide by 1 cm deep  Condition stable  Complications none  Specimens (From admission, onward)     Start     Ordered    02/07/20 1036  Specimen to Pathology, Surgery Other  Once     Question:  Procedure Type:  Answer:  Other    02/07/20 1037                 Procedure:   Patient was placed in a supine position on the OR table attention was then directed towards the patient's left ankle were Webril cast padding was placed on the patient's left ankle as was an ankle tourniquet.  Patient was locally anesthetized in a regional block of the 1st ray left foot utilizing a total of 20 cc of 1% lidocaine plain patient's foot was then prepped and draped in the usual sterile manner patient's foot was exsanguinated utilizing an Esmarch bandage and ankle tourniquet was raised to 250 mm  of mercury.  Following this a double semi elliptical incision was created removing the chronic area of tissue ulceration and soft tissue mass from the plantar surface of the patient's left foot this was approximately 2.5 cm long by 1 cm wide by 1 cm deep there was excessive amount of hyperkeratotic tissue and scar tissue underlying this area upon removal of this the flexor hallucis tendon was visualized it was noted to have previously been transected surrounded by scar tissue and somewhat shredded.  Following debridement of the flexor hallucis longus tendon this was able to be reattached at the base of the distal phalanx following application of an Arthrex 2.7 tendon anchor it was then sutured back to its normal anatomic positioning and prevented the excessive dorsiflexion patient had preoperatively of the left hallux.  Following this redundant hyperkeratotic tissue on either side of the incision was resected and removed from the surgical field the area was flushed irrigated with copious amounts of sterile saline primary closure of the area of excision was utilized with 2.0 nylon in a simple interrupted vertical mattress and horizontal mattress fashion 2.  Nylon was also used to facilitate further closure of the site patient's hallux was placed in a neutral position Betadine-soaked 4x4s were utilized to splint the hallux in a neutral position and prevent dorsiflexion of the area patient was constantly dorsiflexing her left hallux throughout the procedure and obviously does this in relation to likely restless leg syndrome bilateral patient was locally anesthetized with a long-term postoperative anesthetic with 30 cc of 0.5% Marcaine plain Adaptic nonadhesive dressing was applied directly overlying the incision sterile 4x4s Kerlix ABD were used to dress the area ankle tourniquet was lowered prior to application of the dressing no significant bleeding noted patient was then placed in a fracture boot that will remain in  place and is not to be removed her weight-bearing is going to be very minimal postoperatively to protect the surgical area.  Patient left the operating room with vital signs stable and vascular status having return to the patient's preoperative levels.  Separate procedure was performed for excision of the soft tissue mass subsequent repair of the flexor hallucis tendon was performed due to a previous surgery by another provider which transected this tendon. Patient advised postoperatively it is going to be important that she maintains very limited weight-bearing status and wear the fracture boot at all times of weight-bearing to allow this area to heal properly I have recommended her not dorsiflexing her plantar flexing the left hallux the way it is currently position in the boot she should not be able to do that limited motion will facilitate healing.  This note was created using [employer] voice recognition software that occasionally misinterpreted phrases or words.

## 2020-02-07 NOTE — BRIEF OP NOTE
[facility] - Periop Services  Brief Operative Note    Surgery Date: 2/7/2020     Surgeon(s) and Role:     * Osvaldo Arreola DPM - Primary    Assisting Surgeon: None    Pre-op Diagnosis:  Skin ulcer of left foot with fat layer exposed [L97.522]    Post-op Diagnosis:  Post-Op Diagnosis Codes:     * Skin ulcer of left foot with fat layer exposed [L97.522]    Procedure(s) (LRB):  EXCISION, MASS, FOOT (Left)    Anesthesia: Choice    Description of the findings of the procedure(s):     Estimated Blood Loss: * No values recorded between 2/7/2020 12:00 AM and 2/7/2020 10:43 AM *         Specimens:   Specimen (12h ago, onward)    None            Discharge Note    OUTCOME: Patient tolerated treatment/procedure well without complication and is now ready for discharge.    DISPOSITION: Home or Self Care    FINAL DIAGNOSIS:  <principal problem not specified>    FOLLOWUP: In clinic

## 2020-02-07 NOTE — ANESTHESIA POSTPROCEDURE EVALUATION
Anesthesia Post Evaluation    Patient: Marifer Bernardo    Procedure(s) Performed: Procedure(s) (LRB):  EXCISION, MASS, FOOT (Left)  REPAIR, TENDON, LOWER EXTREMITY (Left)    Final Anesthesia Type: general    Patient location during evaluation: PACU  Patient participation: Yes- Able to Participate  Level of consciousness: awake and awake and alert  Post-procedure vital signs: reviewed and stable  Pain management: adequate  Airway patency: patent    PONV status at discharge: No PONV  Anesthetic complications: no      Cardiovascular status: blood pressure returned to baseline  Respiratory status: unassisted and spontaneous ventilation  Hydration status: euvolemic  Follow-up not needed.          Vitals Value Taken Time   /57 2/7/2020 11:32 AM   Temp 36.5 °C (97.7 °F) 2/7/2020 10:45 AM   Pulse 74 2/7/2020 11:33 AM   Resp 11 2/7/2020 11:33 AM   SpO2 96 % 2/7/2020 11:33 AM   Vitals shown include unvalidated device data.      Event Time     Out of Recovery 11:30:00          Pain/Rohith Score: Rohith Score: 10 (2/7/2020 11:30 AM)  Modified Rohith Score: 19 (2/7/2020 11:30 AM)

## 2020-02-07 NOTE — ANESTHESIA PREPROCEDURE EVALUATION
02/07/2020  Marifer Bernardo is a 69 y.o., female.    Pre-op Assessment    I have reviewed the Patient Summary Reports.    I have reviewed the Nursing Notes.   I have reviewed the Medications.     Review of Systems  Anesthesia Hx:  No problems with previous Anesthesia Hx of Anesthetic complications       Postop nausea     Neg history of prior surgery. Denies Family Hx of Anesthesia complications.   Denies Personal Hx of Anesthesia complications.   Social:  Non-Smoker    Hematology/Oncology:  Hematology Normal   Oncology Normal     EENT/Dental:EENT/Dental Normal   Cardiovascular:   Hypertension    Pulmonary:  Pulmonary Normal    Renal/:  Renal/ Normal     Hepatic/GI:  Hepatic/GI Normal    Musculoskeletal:   Arthritis     Neurological:  Neurology Normal    Endocrine:   Diabetes, well controlled, type 2    Dermatological:  Skin Normal    Psych:  Psychiatric Normal           Physical Exam  General:  Well nourished, Morbid Obesity    Airway/Jaw/Neck:  Airway Findings: Mouth Opening: Normal Tongue: Normal  General Airway Assessment: Adult  Mallampati: III  TM Distance: < 4 cm        Eyes/Ears/Nose:  EYES/EARS/NOSE FINDINGS: Normal   Dental:  DENTAL FINDINGS: Normal   Chest/Lungs:  Chest/Lungs Clear    Heart/Vascular:  Heart Findings: Normal Heart murmur: negative Vascular Findings: Normal    Abdomen:  Abdomen Findings: Normal    Musculoskeletal:  Musculoskeletal Findings: Normal   Skin:  Skin Findings: Normal    Mental Status:  Mental Status Findings: Normal        Anesthesia Plan  Type of Anesthesia, risks & benefits discussed:  Anesthesia Type:  general  Patient's Preference:   Intra-op Monitoring Plan: standard ASA monitors  Intra-op Monitoring Plan Comments:   Post Op Pain Control Plan:   Post Op Pain Control Plan Comments:   Induction:   IV  Beta Blocker:  Patient is not currently on a Beta-Blocker (No  further documentation required).       Informed Consent: Patient understands risks and agrees with Anesthesia plan.  Questions answered. Anesthesia consent signed with patient.  ASA Score: 2     Day of Surgery Review of History & Physical:    H&P update referred to the provider.

## 2020-02-07 NOTE — PLAN OF CARE
Pt arrives to PACU via stretcher awake and able to follow commands. Incision site to left foot ace wrap 4 x 4  Boot in place, iced and elevated. VSS, no complaints will continue to monitor.

## 2020-02-07 NOTE — TRANSFER OF CARE
"Anesthesia Transfer of Care Note    Patient: Marifer Bernardo    Procedure(s) Performed: Procedure(s) (LRB):  EXCISION, MASS, FOOT (Left)    Patient location: PACU    Anesthesia Type: general    Transport from OR: Transported from OR on room air with adequate spontaneous ventilation    Post pain: adequate analgesia    Post assessment: no apparent anesthetic complications and tolerated procedure well    Post vital signs: stable    Level of consciousness: awake, alert and oriented    Nausea/Vomiting: no nausea/vomiting    Complications: none    Transfer of care protocol was followed      Last vitals:   Visit Vitals  /66   Pulse 80   Temp 36.6 °C (97.8 °F) (Oral)   Resp 16   Ht 5' 4" (1.626 m)   Wt 113.4 kg (250 lb)   SpO2 96%   BMI 42.91 kg/m²     "

## 2020-02-10 VITALS
OXYGEN SATURATION: 97 % | TEMPERATURE: 98 F | HEART RATE: 70 BPM | SYSTOLIC BLOOD PRESSURE: 113 MMHG | WEIGHT: 250 LBS | HEIGHT: 64 IN | DIASTOLIC BLOOD PRESSURE: 60 MMHG | BODY MASS INDEX: 42.68 KG/M2 | RESPIRATION RATE: 12 BRPM

## 2020-02-11 ENCOUNTER — OFFICE VISIT (OUTPATIENT)
Dept: PODIATRY | Facility: CLINIC | Age: 70
End: 2020-02-11
Payer: MEDICARE

## 2020-02-11 VITALS
SYSTOLIC BLOOD PRESSURE: 123 MMHG | WEIGHT: 250 LBS | HEIGHT: 64 IN | BODY MASS INDEX: 42.68 KG/M2 | TEMPERATURE: 97 F | HEART RATE: 73 BPM | DIASTOLIC BLOOD PRESSURE: 63 MMHG

## 2020-02-11 DIAGNOSIS — E10.40 TYPE 1 DIABETES MELLITUS WITH DIABETIC NEUROPATHY: Primary | ICD-10-CM

## 2020-02-11 DIAGNOSIS — S96.912S: ICD-10-CM

## 2020-02-11 DIAGNOSIS — M76.70 PERONEAL TENDONITIS, UNSPECIFIED LATERALITY: ICD-10-CM

## 2020-02-11 PROCEDURE — 99999 PR PBB SHADOW E&M-EST. PATIENT-LVL III: CPT | Mod: PBBFAC,,, | Performed by: PODIATRIST

## 2020-02-11 PROCEDURE — 99024 POSTOP FOLLOW-UP VISIT: CPT | Mod: S$GLB,,, | Performed by: PODIATRIST

## 2020-02-11 PROCEDURE — 99024 PR POST-OP FOLLOW-UP VISIT: ICD-10-PCS | Mod: S$GLB,,, | Performed by: PODIATRIST

## 2020-02-11 PROCEDURE — 99999 PR PBB SHADOW E&M-EST. PATIENT-LVL III: ICD-10-PCS | Mod: PBBFAC,,, | Performed by: PODIATRIST

## 2020-02-11 RX ORDER — GABAPENTIN 400 MG/1
400 CAPSULE ORAL NIGHTLY
Qty: 30 CAPSULE | Refills: 1 | Status: SHIPPED | OUTPATIENT
Start: 2020-02-11 | End: 2020-03-24 | Stop reason: SDUPTHER

## 2020-02-11 NOTE — LETTER
February 11, 2020      Jose Mary MD  [address] [facility] [place] [address]           [facility] - Podiatry/Wound Care  [facility] [facility] [address]  Phone: [phone]  Fax: [phone]          Patient: Marifer Bernardo   MR Number: [id number]   YOB: 1950   Date of Visit: 2/11/2020       Dear Dr. Jose Mary:    Thank you for referring Marifer Bernardo to me for evaluation. Attached you will find relevant portions of my assessment and plan of care.    If you have questions, please do not hesitate to call me. I look forward to following Marifer Bernardo along with you.    Sincerely,    Osvaldo Arreola, DPRICK    Enclosure  CC:  No Recipients    If you would like to receive this communication electronically, please contact [email or web address] or [phone] to request more information on [employer] Link access.    For providers and/or their staff who would like to refer a patient to [facility], please contact us through our one-stop-shop provider referral line, [facility] [place], at [phone].    If you feel you have received this communication in error or would no longer like to receive these types of communications, please e-mail [email or web address]

## 2020-02-11 NOTE — PROGRESS NOTES
"Marifer Bernardo is a 69 y.o. female patient.   1. Type 1 diabetes mellitus with diabetic neuropathy    2. Tendon tear, foot, left, sequela    3. Peroneal tendonitis, unspecified laterality      Past Medical History:   Diagnosis Date    Arthritis     Diabetes mellitus, type 2     Encounter for blood transfusion     Hypertension     PONV (postoperative nausea and vomiting)     Wears glasses      No past surgical history pertinent negatives on file.  Scheduled Meds:  Continuous Infusions:  PRN Meds:    Review of patient's allergies indicates:   Allergen Reactions    Banana Hives    Penicillins Hives     There are no hospital problems to display for this patient.    Blood pressure 123/63, pulse 73, temperature 97.1 °F (36.2 °C), height 5' 4" (1.626 m), weight 113.4 kg (250 lb).        Subjective  Objective   Assessment & Plan    patient presents status post excision of a chronic ulceration with primary closure repair of the flexor hallucis longus tendon with fixation.  Patient states she is doing very well she has not had any pain or discomfort regarding her left foot she has been experiencing some stabbing burning shooting pain on the outside of her right foot she states she thinks it may be because she is putting so much weight on her right foot. Patient states she is really try to stay off of her feet as much as possible.  Patient is currently taking the Bactrim as directed.  Patient is currently afebrile and in no acute distress upon removal of the patient's dressing the incision looks good there is a little bit of maceration at the edges of the incision due to where there was some bleeding on the dressing otherwise patient has very limited inflammation no active signs of infection noted a new well-padded thick dry dressing was applied to the left lower extremity patient was placed back in the fracture boot which was must remain on at all times patient's weight-bearing is to be very limited in order to " facilitate healing.  Patient was seen for separate evaluation and management for pain on the outside of her right foot not related to the left foot she did have some neuritis along the course of the sural nerve and some peroneal tendinitis I have advised the patient this is likely to the extra weight she is putting on her right foot this is especially painful at night I have recommended starting the patient on gabapentin 400 mg at bedtime.  Plan follow-up will be 1 week for a dressing change patient advised to contact us with any problems questions or concerns.This note was created using [employer] voice recognition software that occasionally misinterpreted phrases or words.    Osvaldo Arreola DPM  2/11/2020

## 2020-02-18 ENCOUNTER — OFFICE VISIT (OUTPATIENT)
Dept: PODIATRY | Facility: CLINIC | Age: 70
End: 2020-02-18
Payer: MEDICARE

## 2020-02-18 VITALS
BODY MASS INDEX: 42.68 KG/M2 | HEIGHT: 64 IN | TEMPERATURE: 98 F | WEIGHT: 250 LBS | SYSTOLIC BLOOD PRESSURE: 134 MMHG | DIASTOLIC BLOOD PRESSURE: 72 MMHG | HEART RATE: 70 BPM

## 2020-02-18 DIAGNOSIS — R22.42 MASS OF LEFT FOOT: ICD-10-CM

## 2020-02-18 DIAGNOSIS — S96.912S: Primary | ICD-10-CM

## 2020-02-18 DIAGNOSIS — E10.40 TYPE 1 DIABETES MELLITUS WITH DIABETIC NEUROPATHY: ICD-10-CM

## 2020-02-18 DIAGNOSIS — R26.81 UNSTEADY GAIT: ICD-10-CM

## 2020-02-18 PROCEDURE — 99024 PR POST-OP FOLLOW-UP VISIT: ICD-10-PCS | Mod: S$GLB,,, | Performed by: PODIATRIST

## 2020-02-18 PROCEDURE — 99999 PR PBB SHADOW E&M-EST. PATIENT-LVL III: CPT | Mod: PBBFAC,,, | Performed by: PODIATRIST

## 2020-02-18 PROCEDURE — 99024 POSTOP FOLLOW-UP VISIT: CPT | Mod: S$GLB,,, | Performed by: PODIATRIST

## 2020-02-18 PROCEDURE — 99999 PR PBB SHADOW E&M-EST. PATIENT-LVL III: ICD-10-PCS | Mod: PBBFAC,,, | Performed by: PODIATRIST

## 2020-02-18 NOTE — LETTER
February 22, 2020      Jose Mary MD  [address] [facility] [place] [address]           [facility] - Podiatry/Wound Care  [facility] [address]  Phone: [phone]  Fax: [phone]          Patient: Marifer Bernardo   MR Number: [id number]   YOB: 1950   Date of Visit: 2/18/2020       Dear Dr. Jose Mary:    Thank you for referring Marifer Bernardo to me for evaluation. Attached you will find relevant portions of my assessment and plan of care.    If you have questions, please do not hesitate to call me. I look forward to following Marifer Bernardo along with you.    Sincerely,    Osvaldo Arreola, DPRICK    Enclosure  CC:  No Recipients    If you would like to receive this communication electronically, please contact [email or web address] or [phone] to request more information on [employer] Link access.    For providers and/or their staff who would like to refer a patient to [facility], please contact us through our one-stop-shop provider referral line, [facility], at [phone].    If you feel you have received this communication in error or would no longer like to receive these types of communications, please e-mail [email or web address]

## 2020-02-19 ENCOUNTER — TELEPHONE (OUTPATIENT)
Dept: PODIATRY | Facility: CLINIC | Age: 70
End: 2020-02-19

## 2020-02-19 LAB
FINAL PATHOLOGIC DIAGNOSIS: NORMAL
GROSS: NORMAL
MICROSCOPIC EXAM: NORMAL

## 2020-02-19 NOTE — TELEPHONE ENCOUNTER
----- Message from Osvaldo Arreola DPM sent at 2/19/2020  1:51 PM CST -----  Please call the patient regarding her abnormal result.Call and advise path report states Skin, left foot hallux, excision:   -EPIDERMAL ULCERATION WITH MIXED INFLAMMATION, DERMAL FIBROSIS, AND   PSEUDOEPITHELIOMATOUS HYPERPLASIA,    Nothing malignant

## 2020-02-23 NOTE — PROGRESS NOTES
"Marifer Bernardo is a 69 y.o. female patient.   1. Tendon tear, foot, left, sequela    2. Mass of left foot    3. Unsteady gait    4. Type 1 diabetes mellitus with diabetic neuropathy      Past Medical History:   Diagnosis Date    Arthritis     Diabetes mellitus, type 2     Encounter for blood transfusion     Hypertension     PONV (postoperative nausea and vomiting)     Wears glasses      No past surgical history pertinent negatives on file.  Scheduled Meds:  Continuous Infusions:  PRN Meds:    Review of patient's allergies indicates:   Allergen Reactions    Banana Hives    Penicillins Hives     There are no hospital problems to display for this patient.    Blood pressure 134/72, pulse 70, temperature 97.8 °F (36.6 °C), height 5' 4" (1.626 m), weight 113.4 kg (250 lb).                 Subjective  Objective:  Vital signs (most recent): Blood pressure 134/72, pulse 70, temperature 97.8 °F (36.6 °C), height 5' 4" (1.626 m), weight 113.4 kg (250 lb).     Assessment & Plan    patient presents status post excision of a chronic ulceration with primary closure repair of the flexor hallucis longus tendon with fixation.  Patient states she is doing very well she has not had any pain or discomfort regarding her left foot she has been experiencing some stabbing burning shooting pain on the outside of her right foot she states she thinks it may be because she is putting so much weight on her right foot. Patient states she is really try to stay off of her feet as much as possible.  Patient is currently taking the Bactrim as directed.  Patient is currently afebrile and in no acute distress upon removal of the patient's dressing the incision looks good there is a little bit of maceration at the edges of the incision due to where there was some bleeding on the dressing otherwise patient has very limited inflammation no active signs of infection noted a new well-padded thick dry dressing was applied to the left lower extremity " patient was placed back in the fracture boot which was must remain on at all times patient's weight-bearing is to be very limited in order to facilitate healing.  Patient relates that the nerve pain that she was having at night has been completely taking care of by the 400 mg of gabapentin that I put her on.  Patient's surgical site continues to look good and new dry dressing applied patient put back in her protective splint and will see her for follow-up in 1 week.  Plan follow-up will be 1 week for a dressing change patient advised to contact us with any problems questions or concerns.This note was created using [employer] voice recognition software that occasionally misinterpreted phrases or words.                    Osvaldo Arreola DPM  2/22/2020

## 2020-02-27 ENCOUNTER — OFFICE VISIT (OUTPATIENT)
Dept: PODIATRY | Facility: CLINIC | Age: 70
End: 2020-02-27
Payer: MEDICARE

## 2020-02-27 VITALS
WEIGHT: 250 LBS | SYSTOLIC BLOOD PRESSURE: 120 MMHG | BODY MASS INDEX: 42.68 KG/M2 | TEMPERATURE: 97 F | DIASTOLIC BLOOD PRESSURE: 68 MMHG | HEIGHT: 64 IN | HEART RATE: 88 BPM

## 2020-02-27 DIAGNOSIS — S96.912S: ICD-10-CM

## 2020-02-27 DIAGNOSIS — R22.42 MASS OF LEFT FOOT: Primary | ICD-10-CM

## 2020-02-27 PROCEDURE — 99999 PR PBB SHADOW E&M-EST. PATIENT-LVL III: CPT | Mod: PBBFAC,,, | Performed by: PODIATRIST

## 2020-02-27 PROCEDURE — 99999 PR PBB SHADOW E&M-EST. PATIENT-LVL III: ICD-10-PCS | Mod: PBBFAC,,, | Performed by: PODIATRIST

## 2020-02-27 PROCEDURE — 99024 PR POST-OP FOLLOW-UP VISIT: ICD-10-PCS | Mod: S$GLB,,, | Performed by: PODIATRIST

## 2020-02-27 PROCEDURE — 99024 POSTOP FOLLOW-UP VISIT: CPT | Mod: S$GLB,,, | Performed by: PODIATRIST

## 2020-03-01 NOTE — PROGRESS NOTES
"Marifer Bernardo is a 70 y.o. female patient.   1. Mass of left foot    2. Tendon tear, foot, left, sequela      Past Medical History:   Diagnosis Date    Arthritis     Diabetes mellitus, type 2     Encounter for blood transfusion     Hypertension     PONV (postoperative nausea and vomiting)     Wears glasses      No past surgical history pertinent negatives on file.  Scheduled Meds:  Continuous Infusions:  PRN Meds:    Review of patient's allergies indicates:   Allergen Reactions    Banana Hives    Penicillins Hives     There are no hospital problems to display for this patient.    Blood pressure 120/68, pulse 88, temperature 97.1 °F (36.2 °C), height 5' 4" (1.626 m), weight 113.4 kg (250 lb).                 Subjective  Objective:  Vital signs (most recent): Blood pressure 120/68, pulse 88, temperature 97.1 °F (36.2 °C), height 5' 4" (1.626 m), weight 113.4 kg (250 lb).     Assessment & Plan    patient presents status post excision of a chronic ulceration with primary closure repair of the flexor hallucis longus tendon with fixation.  Patient states she is doing very well she has not had any pain or discomfort regarding her left foot she has been experiencing some stabbing burning shooting pain on the outside of her right foot she states she thinks it may be because she is putting so much weight on her right foot. Patient states she is really try to stay off of her feet as much as possible.  Patient is currently taking the Bactrim as directed.  Patient is currently afebrile and in no acute distress upon removal of the patient's dressing the incision looks good there is a little bit of maceration at the edges of the incision due to where there was some bleeding on the dressing otherwise patient has very limited inflammation no active signs of infection noted a new well-padded thick dry dressing was applied to the left lower extremity patient was placed back in the fracture boot which was must remain on at all " times patient's weight-bearing is to be very limited in order to facilitate healing.  Patient was placed in her fracture boot I will allow her to start bearing weight as tolerated however limited.  Patient's surgical site continues to look good and new dry dressing applied.  Patient advised I will likely remove her sutures in 2 weeks.  Plan follow-up will be 1 week for a dressing change patient advised to contact us with any problems questions or concerns.This note was created using [employer] voice recognition software that occasionally   misinterpreted phrases or words.                    Osvaldo Arreola DPM  3/1/2020

## 2020-03-03 ENCOUNTER — OFFICE VISIT (OUTPATIENT)
Dept: PODIATRY | Facility: CLINIC | Age: 70
End: 2020-03-03
Payer: MEDICARE

## 2020-03-03 VITALS
OXYGEN SATURATION: 97 % | SYSTOLIC BLOOD PRESSURE: 135 MMHG | TEMPERATURE: 98 F | WEIGHT: 250 LBS | HEART RATE: 76 BPM | BODY MASS INDEX: 42.68 KG/M2 | RESPIRATION RATE: 16 BRPM | HEIGHT: 64 IN | DIASTOLIC BLOOD PRESSURE: 73 MMHG

## 2020-03-03 DIAGNOSIS — S96.912S: ICD-10-CM

## 2020-03-03 DIAGNOSIS — R22.42 MASS OF LEFT FOOT: Primary | ICD-10-CM

## 2020-03-03 PROCEDURE — 99999 PR PBB SHADOW E&M-EST. PATIENT-LVL IV: ICD-10-PCS | Mod: PBBFAC,,, | Performed by: PODIATRIST

## 2020-03-03 PROCEDURE — 99999 PR PBB SHADOW E&M-EST. PATIENT-LVL IV: CPT | Mod: PBBFAC,,, | Performed by: PODIATRIST

## 2020-03-03 PROCEDURE — 99024 PR POST-OP FOLLOW-UP VISIT: ICD-10-PCS | Mod: S$GLB,,, | Performed by: PODIATRIST

## 2020-03-03 PROCEDURE — 99024 POSTOP FOLLOW-UP VISIT: CPT | Mod: S$GLB,,, | Performed by: PODIATRIST

## 2020-03-03 NOTE — LETTER
March 6, 2020      Jose Mary MD  [address] [facility] [place] [address]           [facility] - Podiatry/Wound Care  [facility] [address]  Phone: [phone]  Fax: [phone]          Patient: Marifer Bernardo   MR Number: [id number]   YOB: 1950   Date of Visit: 3/3/2020       Dear Dr. Jose Mary:    Thank you for referring Marifer Bernardo to me for evaluation. Attached you will find relevant portions of my assessment and plan of care.    If you have questions, please do not hesitate to call me. I look forward to following Marifer Bernardo along with you.    Sincerely,    Franc Mart  CC:  No Recipients    If you would like to receive this communication electronically, please contact [email or web address] or [phone] to request more information on [employer] Link access.    For providers and/or their staff who would like to refer a patient to [facility], please contact us through our one-stop-shop provider referral line, [facility], at [phone].    If you feel you have received this communication in error or would no longer like to receive these types of communications, please e-mail [email or web address]

## 2020-03-07 NOTE — PROGRESS NOTES
"Marifer Bernardo is a 70 y.o. female patient.   1. Mass of left foot    2. Tendon tear, foot, left, sequela      Past Medical History:   Diagnosis Date    Arthritis     Diabetes mellitus, type 2     Encounter for blood transfusion     Hypertension     PONV (postoperative nausea and vomiting)     Wears glasses      No past surgical history pertinent negatives on file.  Scheduled Meds:  Continuous Infusions:  PRN Meds:    Review of patient's allergies indicates:   Allergen Reactions    Banana Hives    Penicillins Hives     There are no hospital problems to display for this patient.    Blood pressure 135/73, pulse 76, temperature 97.9 °F (36.6 °C), resp. rate 16, height 5' 4" (1.626 m), weight 113.4 kg (250 lb), SpO2 97 %.                 Subjective  Objective:  Vital signs (most recent): Blood pressure 135/73, pulse 76, temperature 97.9 °F (36.6 °C), resp. rate 16, height 5' 4" (1.626 m), weight 113.4 kg (250 lb), SpO2 97 %.                     Assessment & Plan    patient presents status post excision of a chronic ulceration with primary closure repair of the flexor hallucis longus tendon with fixation.  Patient states she is doing very well she has not had any pain or discomfort regarding her left foot she has been experiencing some stabbing burning shooting pain on the outside of her right foot she states she thinks it may be because she is putting so much weight on her right foot. Patient states she is really try to stay off of her feet as much as possible.  Patient is currently taking the Bactrim as directed.  Patient is currently afebrile and in no acute distress upon removal of the patient's dressing the incision looks good there is a little bit of maceration at the edges of the incision due to where there was some bleeding on the dressing otherwise patient has very limited inflammation no active signs of infection noted a new well-padded thick dry dressing was applied to the left lower extremity patient " was placed back in the fracture boot which was must remain on at all times patient's weight-bearing is to be very limited in order to facilitate healing.  Patient was placed in her fracture boot I will allow her to start bearing weight as tolerated however limited.  Patient's surgical site continues to look good and new dry dressing applied.  Patient advised I will likely remove her sutures in 1 week.  Plan follow-up will be 1 week for a dressing change patient advised to contact us with any problems questions or concerns.This note was created using [employer] voice recognition software that occasionally   misinterpreted phrases or words.                    Osvaldo Arreola, CARLOS  3/7/2020

## 2020-03-10 ENCOUNTER — OFFICE VISIT (OUTPATIENT)
Dept: PODIATRY | Facility: CLINIC | Age: 70
End: 2020-03-10
Payer: MEDICARE

## 2020-03-10 VITALS
HEIGHT: 64 IN | HEART RATE: 77 BPM | BODY MASS INDEX: 42.68 KG/M2 | TEMPERATURE: 98 F | WEIGHT: 250 LBS | SYSTOLIC BLOOD PRESSURE: 132 MMHG | DIASTOLIC BLOOD PRESSURE: 76 MMHG

## 2020-03-10 DIAGNOSIS — R22.42 MASS OF LEFT FOOT: Primary | ICD-10-CM

## 2020-03-10 PROCEDURE — 99024 PR POST-OP FOLLOW-UP VISIT: ICD-10-PCS | Mod: S$GLB,,, | Performed by: PODIATRIST

## 2020-03-10 PROCEDURE — 99999 PR PBB SHADOW E&M-EST. PATIENT-LVL III: CPT | Mod: PBBFAC,,, | Performed by: PODIATRIST

## 2020-03-10 PROCEDURE — 99024 POSTOP FOLLOW-UP VISIT: CPT | Mod: S$GLB,,, | Performed by: PODIATRIST

## 2020-03-10 PROCEDURE — 99999 PR PBB SHADOW E&M-EST. PATIENT-LVL III: ICD-10-PCS | Mod: PBBFAC,,, | Performed by: PODIATRIST

## 2020-03-10 NOTE — LETTER
March 14, 2020      Jose Mary MD  [address] [facility] [place] [address]           [facility] - Podiatry/Wound Care  [facility] [facility] [address]  Phone: [phone]  Fax: [phone]          Patient: Marifer Bernardo   MR Number: [id number]   YOB: 1950   Date of Visit: 3/10/2020       Dear Dr. Jose Mary:    Thank you for referring Marifer Bernardo to me for evaluation. Attached you will find relevant portions of my assessment and plan of care.    If you have questions, please do not hesitate to call me. I look forward to following Marifer Bernardo along with you.    Sincerely,    Osvaldo Arreola, DPRICK    Enclosure  CC:  No Recipients    If you would like to receive this communication electronically, please contact [email or web address] or [phone] to request more information on [employer] Link access.    For providers and/or their staff who would like to refer a patient to [facility], please contact us through our one-stop-shop provider referral line, [facility] [place], at [phone].    If you feel you have received this communication in error or would no longer like to receive these types of communications, please e-mail [email or web address]

## 2020-03-14 NOTE — PROGRESS NOTES
"Marifer Bernardo is a 70 y.o. female patient.   1. Mass of left foot      Past Medical History:   Diagnosis Date    Arthritis     Diabetes mellitus, type 2     Encounter for blood transfusion     Hypertension     PONV (postoperative nausea and vomiting)     Wears glasses      No past surgical history pertinent negatives on file.  Scheduled Meds:  Continuous Infusions:  PRN Meds:    Review of patient's allergies indicates:   Allergen Reactions    Banana Hives    Penicillins Hives     There are no hospital problems to display for this patient.    Blood pressure 132/76, pulse 77, temperature 97.9 °F (36.6 °C), height 5' 4" (1.626 m), weight 113.4 kg (250 lb).                             Subjective  Objective:  Vital signs (most recent): Blood pressure 132/76, pulse 77, temperature 97.9 °F (36.6 °C), height 5' 4" (1.626 m), weight 113.4 kg (250 lb).     Assessment & Plan    Patient presents status post excision of a chronic ulceration with primary closure repair of the flexor hallucis longus tendon with fixation.  Patient states she is doing well she is not having any pain or discomfort her sutures were removed today I have advised her I do not want her getting the area wet she does have to change the dressing every day she is going to apply Betadine to the area a nonstick Telfa pad and a light dressing I am going to allow her to wear regular shoe as tolerated I have advised her to be careful about activity not overdo it and patient was instructed there is some maceration and wetness in the central aspect of the wound however much of this tissue was able to be debrided off of the area there is a small centralized ulceration present however this is superficial in displays no active signs of infection.  I advised the patient I will follow up with her in 2 weeks I do not want her over bending her stretching the left great toe certainly if she has any problems questions or concerns she is to contact me.  This note was " created using [employer] voice recognition software that occasionally misinterpreted phrases or words.                    Osvaldo Arreola DPM  3/14/2020

## 2020-03-24 ENCOUNTER — OFFICE VISIT (OUTPATIENT)
Dept: PODIATRY | Facility: CLINIC | Age: 70
End: 2020-03-24
Payer: MEDICARE

## 2020-03-24 VITALS
SYSTOLIC BLOOD PRESSURE: 160 MMHG | BODY MASS INDEX: 41.83 KG/M2 | DIASTOLIC BLOOD PRESSURE: 72 MMHG | TEMPERATURE: 98 F | RESPIRATION RATE: 18 BRPM | HEART RATE: 79 BPM | HEIGHT: 64 IN | WEIGHT: 245 LBS

## 2020-03-24 DIAGNOSIS — E10.40 TYPE 1 DIABETES MELLITUS WITH DIABETIC NEUROPATHY: Primary | ICD-10-CM

## 2020-03-24 DIAGNOSIS — R22.42 MASS OF LEFT FOOT: ICD-10-CM

## 2020-03-24 DIAGNOSIS — S96.912S: ICD-10-CM

## 2020-03-24 PROCEDURE — 1101F PR PT FALLS ASSESS DOC 0-1 FALLS W/OUT INJ PAST YR: ICD-10-PCS | Mod: CPTII,S$GLB,, | Performed by: PODIATRIST

## 2020-03-24 PROCEDURE — 99999 PR PBB SHADOW E&M-EST. PATIENT-LVL III: CPT | Mod: PBBFAC,,, | Performed by: PODIATRIST

## 2020-03-24 PROCEDURE — 3077F PR MOST RECENT SYSTOLIC BLOOD PRESSURE >= 140 MM HG: ICD-10-PCS | Mod: CPTII,S$GLB,, | Performed by: PODIATRIST

## 2020-03-24 PROCEDURE — 3077F SYST BP >= 140 MM HG: CPT | Mod: CPTII,S$GLB,, | Performed by: PODIATRIST

## 2020-03-24 PROCEDURE — 99213 OFFICE O/P EST LOW 20 MIN: CPT | Mod: 24,S$GLB,, | Performed by: PODIATRIST

## 2020-03-24 PROCEDURE — 3078F PR MOST RECENT DIASTOLIC BLOOD PRESSURE < 80 MM HG: ICD-10-PCS | Mod: CPTII,S$GLB,, | Performed by: PODIATRIST

## 2020-03-24 PROCEDURE — 1126F AMNT PAIN NOTED NONE PRSNT: CPT | Mod: S$GLB,,, | Performed by: PODIATRIST

## 2020-03-24 PROCEDURE — 1101F PT FALLS ASSESS-DOCD LE1/YR: CPT | Mod: CPTII,S$GLB,, | Performed by: PODIATRIST

## 2020-03-24 PROCEDURE — 1126F PR PAIN SEVERITY QUANTIFIED, NO PAIN PRESENT: ICD-10-PCS | Mod: S$GLB,,, | Performed by: PODIATRIST

## 2020-03-24 PROCEDURE — 99213 PR OFFICE/OUTPT VISIT, EST, LEVL III, 20-29 MIN: ICD-10-PCS | Mod: 24,S$GLB,, | Performed by: PODIATRIST

## 2020-03-24 PROCEDURE — 3078F DIAST BP <80 MM HG: CPT | Mod: CPTII,S$GLB,, | Performed by: PODIATRIST

## 2020-03-24 PROCEDURE — 1159F MED LIST DOCD IN RCRD: CPT | Mod: S$GLB,,, | Performed by: PODIATRIST

## 2020-03-24 PROCEDURE — 1159F PR MEDICATION LIST DOCUMENTED IN MEDICAL RECORD: ICD-10-PCS | Mod: S$GLB,,, | Performed by: PODIATRIST

## 2020-03-24 PROCEDURE — 99999 PR PBB SHADOW E&M-EST. PATIENT-LVL III: ICD-10-PCS | Mod: PBBFAC,,, | Performed by: PODIATRIST

## 2020-03-24 RX ORDER — GABAPENTIN 400 MG/1
400 CAPSULE ORAL NIGHTLY
Qty: 30 CAPSULE | Refills: 11 | Status: SHIPPED | OUTPATIENT
Start: 2020-03-24 | End: 2020-04-07 | Stop reason: SDUPTHER

## 2020-03-24 RX ORDER — GABAPENTIN 100 MG/1
100 CAPSULE ORAL 2 TIMES DAILY
Qty: 60 CAPSULE | Refills: 11 | Status: SHIPPED | OUTPATIENT
Start: 2020-03-24 | End: 2020-04-23

## 2020-03-24 NOTE — LETTER
March 28, 2020      Jose Mary MD  [address] [facility] [place] [address]           [facility] - Podiatry/Wound Care  [facility] [facility] [address]  Phone: [phone]  Fax: [phone]          Patient: Marifer Bernardo   MR Number: [id number]   YOB: 1950   Date of Visit: 3/24/2020       Dear Dr. Jose Mary:    Thank you for referring Marifer Bernardo to me for evaluation. Attached you will find relevant portions of my assessment and plan of care.    If you have questions, please do not hesitate to call me. I look forward to following Marifer Bernardo along with you.    Sincerely,    Osvaldo Arreola, DPRICK    Enclosure  CC:  No Recipients    If you would like to receive this communication electronically, please contact [email or web address] or [phone] to request more information on [email or web address] Link access.    For providers and/or their staff who would like to refer a patient to [facility], please contact us through our one-stop-shop provider referral line, [facility], at [phone].    If you feel you have received this communication in error or would no longer like to receive these types of communications, please e-mail [email or web address]

## 2020-03-28 NOTE — PROGRESS NOTES
"Marifer Bernardo is a 70 y.o. female patient.   1. Type 1 diabetes mellitus with diabetic neuropathy    2. Tendon tear, foot, left, sequela    3. Mass of left foot      Past Medical History:   Diagnosis Date    Arthritis     Diabetes mellitus, type 2     Encounter for blood transfusion     Hypertension     PONV (postoperative nausea and vomiting)     Wears glasses      No past surgical history pertinent negatives on file.  Scheduled Meds:  Continuous Infusions:  PRN Meds:    Review of patient's allergies indicates:   Allergen Reactions    Banana Hives    Penicillins Hives     There are no hospital problems to display for this patient.    Blood pressure (!) 160/72, pulse 79, temperature 97.5 °F (36.4 °C), temperature source Oral, resp. rate 18, height 5' 4" (1.626 m), weight 111.1 kg (245 lb).                             Subjective      Objective:  Vital signs (most recent): Blood pressure (!) 160/72, pulse 79, temperature 97.5 °F (36.4 °C), temperature source Oral, resp. rate 18, height 5' 4" (1.626 m), weight 111.1 kg (245 lb).     Assessment & Plan    Patient presents status post excision of a chronic ulceration with primary closure repair of the flexor hallucis longus tendon with fixation.  Patient states she is not currently having any pain or discomfort the surgical site looks great patient has been keeping a light dressing on the area she has been wearing a regular shoe she is being careful not to overdo it and put too much stress on the surgical site.  Patient has considerable improvement with increasing range of motion of the left hallux since the flexor tendon repair.  On evaluation patient has a very superficial will granular wound that is 1.5 cm long by 1 cm wide it is only 1 mm deep there is no signs of infection healthy granular tissue is covering this area all of the previously noted nonviable macerated tissue has resolved.  Patient advised I am going to have her discontinue using the Betadine " she is going to clean the area with Dakin solution and apply Promogran every day with a light dressing we gave the patient the dressing supplies that she needs this is going to help further healing and completely closing this area I again advised the patient she needs to keep the area well protected padded and limit her activities.  Unrelated evaluation and management was also performed today to address the patient's neuropathy she states that she believes that her neuropathy is getting worse she is having more symptoms during the day.  I discussed at length and in detail with the patient adjusting her medications for her neuropathy she is currently taking 400 mg of gabapentin at bedtime I am going to have her continue to do this this is being managed very well at night time she is having a lot of neuropathic pain during the day of started her on 100 mg in the morning 100 mg in the afternoon and she was dispensed prescription for this will see how she is responding to these medication adjustments when I follow up with her in 2 weeks any problems questions or concerns prior to that time the patient is to contact us immediately unrelated evaluation management was performed today to address diabetic related neuropathy this is not related to previous surgery on the left foot.  Total face-to-face time including stuck she X discussion evaluation wound care and addressing the new patient's concerns regarding her neuropathy equaled 25 min.  Approximately 15 min of that time was spent on the patient's new problem unrelated evaluation management to surgery.  This note was created using [employer] voice recognition software that occasionally misinterpreted phrases or words.                    Osvaldo Arreola, CARLOS  3/28/2020

## 2020-04-07 ENCOUNTER — OFFICE VISIT (OUTPATIENT)
Dept: PODIATRY | Facility: CLINIC | Age: 70
End: 2020-04-07
Payer: MEDICARE

## 2020-04-07 VITALS
DIASTOLIC BLOOD PRESSURE: 78 MMHG | WEIGHT: 245 LBS | HEIGHT: 64 IN | HEART RATE: 70 BPM | BODY MASS INDEX: 41.83 KG/M2 | SYSTOLIC BLOOD PRESSURE: 140 MMHG | TEMPERATURE: 98 F

## 2020-04-07 DIAGNOSIS — E10.40 TYPE 1 DIABETES MELLITUS WITH DIABETIC NEUROPATHY: Primary | ICD-10-CM

## 2020-04-07 DIAGNOSIS — L97.522 SKIN ULCER OF LEFT FOOT WITH FAT LAYER EXPOSED: ICD-10-CM

## 2020-04-07 PROCEDURE — 99024 PR POST-OP FOLLOW-UP VISIT: ICD-10-PCS | Mod: S$GLB,,, | Performed by: PODIATRIST

## 2020-04-07 PROCEDURE — 99999 PR PBB SHADOW E&M-EST. PATIENT-LVL III: CPT | Mod: PBBFAC,,, | Performed by: PODIATRIST

## 2020-04-07 PROCEDURE — 99999 PR PBB SHADOW E&M-EST. PATIENT-LVL III: ICD-10-PCS | Mod: PBBFAC,,, | Performed by: PODIATRIST

## 2020-04-07 PROCEDURE — 99024 POSTOP FOLLOW-UP VISIT: CPT | Mod: S$GLB,,, | Performed by: PODIATRIST

## 2020-04-07 RX ORDER — GABAPENTIN 400 MG/1
400 CAPSULE ORAL NIGHTLY
Qty: 30 CAPSULE | Refills: 11 | Status: SHIPPED | OUTPATIENT
Start: 2020-04-07 | End: 2020-05-07

## 2020-04-07 NOTE — LETTER
April 7, 2020      Jose Mary MD  [address] [facility] [place] [address]           [facility] - Podiatry/Wound Care  [facility] [facility] [address]  Phone: [phone]  Fax: [phone]          Patient: Marifer Bernardo   MR Number: [id number]   YOB: 1950   Date of Visit: 4/7/2020       Dear Dr. Joes Mary:    Thank you for referring Marifer Bernardo to me for evaluation. Attached you will find relevant portions of my assessment and plan of care.    If you have questions, please do not hesitate to call me. I look forward to following Marifer Bernardo along with you.    Sincerely,    Osvaldo Arreola, DPRICK    Enclosure  CC:  No Recipients    If you would like to receive this communication electronically, please contact [email or web address] or [phone] to request more information on [employer] Link access.    For providers and/or their staff who would like to refer a patient to [facility], please contact us through our one-stop-shop provider referral line, [facility], at [phone].    If you feel you have received this communication in error or would no longer like to receive these types of communications, please e-mail [email or web address]

## 2020-04-07 NOTE — PROGRESS NOTES
"Marifer Bernardo is a 70 y.o. female patient.   1. Type 1 diabetes mellitus with diabetic neuropathy    2. Skin ulcer of left foot with fat layer exposed      Past Medical History:   Diagnosis Date    Arthritis     Diabetes mellitus, type 2     Encounter for blood transfusion     Hypertension     PONV (postoperative nausea and vomiting)     Wears glasses      No past surgical history pertinent negatives on file.  Scheduled Meds:  Continuous Infusions:  PRN Meds:    Review of patient's allergies indicates:   Allergen Reactions    Banana Hives    Penicillins Hives     There are no hospital problems to display for this patient.    Blood pressure (!) 140/78, pulse 70, temperature 98.1 °F (36.7 °C), height 5' 4" (1.626 m), weight 111.1 kg (245 lb).                                     Subjective  Objective:  Vital signs (most recent): Blood pressure (!) 140/78, pulse 70, temperature 98.1 °F (36.7 °C), height 5' 4" (1.626 m), weight 111.1 kg (245 lb).     Assessment & Plan    Patient presents status post excision of a chronic ulceration with primary closure repair of the flexor hallucis longus tendon with fixation. Patient has considerable improvement with increasing range of motion of the left hallux since the flexor tendon repair.  On evaluation patient has a very superficial will granular wound that is 1.2 cm long by 0.7 cm wide it is only 1 mm deep there is no signs of infection healthy granular tissue is covering this area all of the previously noted nonviable macerated tissue has resolved.   Patient advised at this point I am going to allow her to get the area wet bathing she must cleaned it immediately after with Dakin solution and keep a light dressing on the area before she goes to bed in the morning she is going to clean the area with wound cleanser dry it thoroughly and apply Promogran with a light dressing this is to be repeated every day.  Patient advised the area continues to heal she has got full range " of motion dorsiflexion and plantar flexion she previously had no plantar flexion pliers prior to repairing the flexor hallucis longus tendon.  Patient states she is doing very well with the gabapentin to control her nerve related discomfort she is currently taking 100 mg in the morning 400 mg at bedtime this is managing her discomfort and she sleeping well at night.  Patient was dispensed a new prescription for gabapentin 400 mg at bedtime she currently has enough of the 100 mg.  Plan follow-up will be 3 weeks. This note was created using [employer] voice recognition software that occasionally misinterpreted phrases or words.                    Osvaldo Arreola DPM  4/7/2020

## 2020-04-28 ENCOUNTER — OFFICE VISIT (OUTPATIENT)
Dept: PODIATRY | Facility: CLINIC | Age: 70
End: 2020-04-28
Payer: MEDICARE

## 2020-04-28 VITALS
HEART RATE: 73 BPM | TEMPERATURE: 98 F | BODY MASS INDEX: 41.83 KG/M2 | SYSTOLIC BLOOD PRESSURE: 155 MMHG | WEIGHT: 245 LBS | DIASTOLIC BLOOD PRESSURE: 74 MMHG | HEIGHT: 64 IN

## 2020-04-28 DIAGNOSIS — L60.0 INGROWN NAIL: Primary | ICD-10-CM

## 2020-04-28 PROCEDURE — 1159F MED LIST DOCD IN RCRD: CPT | Mod: S$GLB,,, | Performed by: PODIATRIST

## 2020-04-28 PROCEDURE — 1159F PR MEDICATION LIST DOCUMENTED IN MEDICAL RECORD: ICD-10-PCS | Mod: S$GLB,,, | Performed by: PODIATRIST

## 2020-04-28 PROCEDURE — 99213 PR OFFICE/OUTPT VISIT, EST, LEVL III, 20-29 MIN: ICD-10-PCS | Mod: 24,S$GLB,, | Performed by: PODIATRIST

## 2020-04-28 PROCEDURE — 99999 PR PBB SHADOW E&M-EST. PATIENT-LVL III: CPT | Mod: PBBFAC,,, | Performed by: PODIATRIST

## 2020-04-28 PROCEDURE — 3078F DIAST BP <80 MM HG: CPT | Mod: CPTII,S$GLB,, | Performed by: PODIATRIST

## 2020-04-28 PROCEDURE — 1125F AMNT PAIN NOTED PAIN PRSNT: CPT | Mod: S$GLB,,, | Performed by: PODIATRIST

## 2020-04-28 PROCEDURE — 99213 OFFICE O/P EST LOW 20 MIN: CPT | Mod: 24,S$GLB,, | Performed by: PODIATRIST

## 2020-04-28 PROCEDURE — 3077F SYST BP >= 140 MM HG: CPT | Mod: CPTII,S$GLB,, | Performed by: PODIATRIST

## 2020-04-28 PROCEDURE — 3077F PR MOST RECENT SYSTOLIC BLOOD PRESSURE >= 140 MM HG: ICD-10-PCS | Mod: CPTII,S$GLB,, | Performed by: PODIATRIST

## 2020-04-28 PROCEDURE — 1125F PR PAIN SEVERITY QUANTIFIED, PAIN PRESENT: ICD-10-PCS | Mod: S$GLB,,, | Performed by: PODIATRIST

## 2020-04-28 PROCEDURE — 99999 PR PBB SHADOW E&M-EST. PATIENT-LVL III: ICD-10-PCS | Mod: PBBFAC,,, | Performed by: PODIATRIST

## 2020-04-28 PROCEDURE — 3078F PR MOST RECENT DIASTOLIC BLOOD PRESSURE < 80 MM HG: ICD-10-PCS | Mod: CPTII,S$GLB,, | Performed by: PODIATRIST

## 2020-04-28 NOTE — LETTER
May 2, 2020      Jose Mary MD  [address] [facility] [place] [address]           [facility] - Podiatry/Wound Care  [facility] [facility] [address]  Phone: [phone]  Fax: [phone]          Patient: Marifer Bernardo   MR Number: [id number]   YOB: 1950   Date of Visit: 4/28/2020       Dear Dr. Jose Mary:    Thank you for referring Marifer Bernardo to me for evaluation. Attached you will find relevant portions of my assessment and plan of care.    If you have questions, please do not hesitate to call me. I look forward to following Marifer Bernardo along with you.    Sincerely,    Osvaldo Arreola, DPRICK    Enclosure  CC:  No Recipients    If you would like to receive this communication electronically, please contact [email or web address] or [phone] to request more information on [email or web address] Link access.    For providers and/or their staff who would like to refer a patient to [facility], please contact us through our one-stop-shop provider referral line, [facility] [place], at [phone].    If you feel you have received this communication in error or would no longer like to receive these types of communications, please e-mail [email or web address]

## 2020-05-02 PROBLEM — L60.0 INGROWN NAIL: Status: ACTIVE | Noted: 2020-05-02

## 2020-05-02 NOTE — PROGRESS NOTES
"Marifer Bernardo is a 70 y.o. female patient.   1. Ingrown nail      Past Medical History:   Diagnosis Date    Arthritis     Diabetes mellitus, type 2     Encounter for blood transfusion     Hypertension     PONV (postoperative nausea and vomiting)     Wears glasses      No past surgical history pertinent negatives on file.  Scheduled Meds:  Continuous Infusions:  PRN Meds:    Review of patient's allergies indicates:   Allergen Reactions    Banana Hives    Penicillins Hives     There are no hospital problems to display for this patient.    Blood pressure (!) 155/74, pulse 73, temperature 97.6 °F (36.4 °C), height 5' 4" (1.626 m), weight 111.1 kg (245 lb).                                         Subjective  Objective:  Vital signs (most recent): Blood pressure (!) 155/74, pulse 73, temperature 97.6 °F (36.4 °C), height 5' 4" (1.626 m), weight 111.1 kg (245 lb).     Assessment & Plan    Patient presents status post excision of a chronic ulceration with primary closure repair of the flexor hallucis longus tendon with fixation. Patient has considerable improvement with increasing range of motion of the left hallux since the flexor tendon repair.  On evaluation patient has a very superficial will granular wound that is 1.0 cm long by 0.4 cm wide it is only 1 mm deep there is no signs of infection healthy granular tissue is covering this area all of the previously noted nonviable macerated tissue has resolved.  Patient was advised the area continues to improve I am going to have her continue to clean the area every day with Dakin solution keep a light dressing on the area every other day she is going to apply Medihoney to the area to provide the area moisture prevent hyperkeratotic buildup.  Non excisional debridement was performed on the wound site today additionally I performed unrelated evaluation and management for an ingrown toenail medial border left hallux the nail was able to be removed early signs of " infection with associated erythema edema noted patient noted relief following removal of the ingrown toenail bacitracin ointment and a light dressing was applied.  Plan follow-up 3 weeks unless the patient has any problems questions or concerns prior.  This note was created using [employer] voice recognition software that occasionally misinterpreted phrases or words.                    Osvaldo Arreola DPM  5/2/2020

## 2020-05-19 ENCOUNTER — OFFICE VISIT (OUTPATIENT)
Dept: PODIATRY | Facility: CLINIC | Age: 70
End: 2020-05-19
Payer: MEDICARE

## 2020-05-19 VITALS
TEMPERATURE: 98 F | WEIGHT: 245 LBS | HEIGHT: 64 IN | BODY MASS INDEX: 41.83 KG/M2 | SYSTOLIC BLOOD PRESSURE: 139 MMHG | DIASTOLIC BLOOD PRESSURE: 72 MMHG | HEART RATE: 76 BPM

## 2020-05-19 DIAGNOSIS — L97.522 SKIN ULCER OF LEFT FOOT WITH FAT LAYER EXPOSED: Primary | ICD-10-CM

## 2020-05-19 DIAGNOSIS — E10.40 TYPE 1 DIABETES MELLITUS WITH DIABETIC NEUROPATHY: ICD-10-CM

## 2020-05-19 PROCEDURE — 99213 PR OFFICE/OUTPT VISIT, EST, LEVL III, 20-29 MIN: ICD-10-PCS | Mod: S$GLB,,, | Performed by: PODIATRIST

## 2020-05-19 PROCEDURE — 99999 PR PBB SHADOW E&M-EST. PATIENT-LVL III: ICD-10-PCS | Mod: PBBFAC,,, | Performed by: PODIATRIST

## 2020-05-19 PROCEDURE — 1125F AMNT PAIN NOTED PAIN PRSNT: CPT | Mod: S$GLB,,, | Performed by: PODIATRIST

## 2020-05-19 PROCEDURE — 3078F PR MOST RECENT DIASTOLIC BLOOD PRESSURE < 80 MM HG: ICD-10-PCS | Mod: CPTII,S$GLB,, | Performed by: PODIATRIST

## 2020-05-19 PROCEDURE — 1159F MED LIST DOCD IN RCRD: CPT | Mod: S$GLB,,, | Performed by: PODIATRIST

## 2020-05-19 PROCEDURE — 3075F PR MOST RECENT SYSTOLIC BLOOD PRESS GE 130-139MM HG: ICD-10-PCS | Mod: CPTII,S$GLB,, | Performed by: PODIATRIST

## 2020-05-19 PROCEDURE — 1125F PR PAIN SEVERITY QUANTIFIED, PAIN PRESENT: ICD-10-PCS | Mod: S$GLB,,, | Performed by: PODIATRIST

## 2020-05-19 PROCEDURE — 3075F SYST BP GE 130 - 139MM HG: CPT | Mod: CPTII,S$GLB,, | Performed by: PODIATRIST

## 2020-05-19 PROCEDURE — 1159F PR MEDICATION LIST DOCUMENTED IN MEDICAL RECORD: ICD-10-PCS | Mod: S$GLB,,, | Performed by: PODIATRIST

## 2020-05-19 PROCEDURE — 3078F DIAST BP <80 MM HG: CPT | Mod: CPTII,S$GLB,, | Performed by: PODIATRIST

## 2020-05-19 PROCEDURE — 99213 OFFICE O/P EST LOW 20 MIN: CPT | Mod: S$GLB,,, | Performed by: PODIATRIST

## 2020-05-19 PROCEDURE — 99999 PR PBB SHADOW E&M-EST. PATIENT-LVL III: CPT | Mod: PBBFAC,,, | Performed by: PODIATRIST

## 2020-05-19 RX ORDER — GABAPENTIN 100 MG/1
CAPSULE ORAL
COMMUNITY
End: 2020-10-13

## 2020-05-19 RX ORDER — GABAPENTIN 400 MG/1
CAPSULE ORAL
COMMUNITY
End: 2020-10-13

## 2020-05-19 NOTE — LETTER
May 23, 2020      Jose Mary MD  [address] [facility] [place] [address]           [facility] - Podiatry/Wound Care  [facility] [facility] [address]  Phone: [phone]  Fax: [phone]          Patient: Marifer Bernardo   MR Number: [id number]   YOB: 1950   Date of Visit: 5/19/2020       Dear Dr. Jose Mary:    Thank you for referring Marifer Bernardo to me for evaluation. Attached you will find relevant portions of my assessment and plan of care.    If you have questions, please do not hesitate to call me. I look forward to following Marifer Bernardo along with you.    Sincerely,    Osvaldo Arreola, DPRICK    Enclosure  CC:  No Recipients    If you would like to receive this communication electronically, please contact [email or web address] or [phone] to request more information on [employer] Link access.    For providers and/or their staff who would like to refer a patient to [facility], please contact us through our one-stop-shop provider referral line, [facility], at [phone].    If you feel you have received this communication in error or would no longer like to receive these types of communications, please e-mail [email or web address]

## 2020-05-23 NOTE — PROGRESS NOTES
"Marifer Bernardo is a 70 y.o. female patient.   1. Skin ulcer of left foot with fat layer exposed    2. Type 1 diabetes mellitus with diabetic neuropathy      Past Medical History:   Diagnosis Date    Arthritis     Diabetes mellitus, type 2     Encounter for blood transfusion     Hypertension     PONV (postoperative nausea and vomiting)     Wears glasses      No past surgical history pertinent negatives on file.  Scheduled Meds:  Continuous Infusions:  PRN Meds:    Review of patient's allergies indicates:   Allergen Reactions    Banana Hives    Penicillins Hives     There are no hospital problems to display for this patient.    Blood pressure 139/72, pulse 76, temperature 98.3 °F (36.8 °C), height 5' 4" (1.626 m), weight 111.1 kg (245 lb).                                                     Subjective  Objective:  Vital signs (most recent): Blood pressure 139/72, pulse 76, temperature 98.3 °F (36.8 °C), height 5' 4" (1.626 m), weight 111.1 kg (245 lb).     Assessment & Plan    Patient presents status post excision of a chronic ulceration with primary closure repair of the flexor hallucis longus tendon with fixation. Patient has considerable improvement with increasing range of motion of the left hallux since the flexor tendon repair.  On evaluation patient has a very superficial will granular wound that is 0.8 cm long by 0.3 cm wide it is only 1 mm deep there is no signs of infection healthy granular tissue is covering this area all of the previously noted nonviable macerated tissue has resolved.  Patient was advised the area continues to improve I am going to have her continue to clean the area every day with Dakin solution keep a light dressing on the area every other day she is going to apply Medihoney to the area to provide the area moisture prevent hyperkeratotic buildup.  Non excisional debridement was performed on the wound site today additionally I performed unrelated evaluation and management for an " ingrown toenail medial border left hallux the nail was able to be removed early signs of infection with associated erythema edema noted patient noted relief following removal of the ingrown toenail bacitracin ointment and a light dressing was applied.  Patient had some blistering at the base of the proximal nail fold left hallux I have advised the patient to monitor this closely she is going to clean this area with Dakin solution and keep a light dressing over the area I was concerned the possibly the bandage on the left great toe had been rubbing or irritating this area.  Patient will apply the Medihoney every other day only to the plantar area the dorsal area is going to have just a light dressing and be cleaned with Dakin's.  Plan follow-up will be 2-3 weeks any problems questions concerns or should this area become larger on the top of the patient's left great toe she is to contact us immediately.  Total face-to-face time equaled 20 min.  Plan follow-up 3 weeks unless the patient has any problems questions or concerns prior.  This note was created using [employer] voice recognition software that occasionally misinterpreted phrases or words.                    Osvaldo Arreola DPM  5/23/2020

## 2020-06-11 ENCOUNTER — OFFICE VISIT (OUTPATIENT)
Dept: PODIATRY | Facility: CLINIC | Age: 70
End: 2020-06-11
Payer: MEDICARE

## 2020-06-11 VITALS
HEART RATE: 76 BPM | WEIGHT: 245 LBS | BODY MASS INDEX: 41.83 KG/M2 | SYSTOLIC BLOOD PRESSURE: 138 MMHG | TEMPERATURE: 98 F | DIASTOLIC BLOOD PRESSURE: 77 MMHG | HEIGHT: 64 IN

## 2020-06-11 DIAGNOSIS — E10.40 TYPE 1 DIABETES MELLITUS WITH DIABETIC NEUROPATHY: Primary | ICD-10-CM

## 2020-06-11 DIAGNOSIS — L97.522 SKIN ULCER OF LEFT FOOT WITH FAT LAYER EXPOSED: ICD-10-CM

## 2020-06-11 PROCEDURE — 3078F PR MOST RECENT DIASTOLIC BLOOD PRESSURE < 80 MM HG: ICD-10-PCS | Mod: CPTII,S$GLB,, | Performed by: PODIATRIST

## 2020-06-11 PROCEDURE — 1125F PR PAIN SEVERITY QUANTIFIED, PAIN PRESENT: ICD-10-PCS | Mod: S$GLB,,, | Performed by: PODIATRIST

## 2020-06-11 PROCEDURE — 3078F DIAST BP <80 MM HG: CPT | Mod: CPTII,S$GLB,, | Performed by: PODIATRIST

## 2020-06-11 PROCEDURE — 99214 OFFICE O/P EST MOD 30 MIN: CPT | Mod: S$GLB,,, | Performed by: PODIATRIST

## 2020-06-11 PROCEDURE — 99214 PR OFFICE/OUTPT VISIT, EST, LEVL IV, 30-39 MIN: ICD-10-PCS | Mod: S$GLB,,, | Performed by: PODIATRIST

## 2020-06-11 PROCEDURE — 1125F AMNT PAIN NOTED PAIN PRSNT: CPT | Mod: S$GLB,,, | Performed by: PODIATRIST

## 2020-06-11 PROCEDURE — 3075F PR MOST RECENT SYSTOLIC BLOOD PRESS GE 130-139MM HG: ICD-10-PCS | Mod: CPTII,S$GLB,, | Performed by: PODIATRIST

## 2020-06-11 PROCEDURE — 1159F MED LIST DOCD IN RCRD: CPT | Mod: S$GLB,,, | Performed by: PODIATRIST

## 2020-06-11 PROCEDURE — 3075F SYST BP GE 130 - 139MM HG: CPT | Mod: CPTII,S$GLB,, | Performed by: PODIATRIST

## 2020-06-11 PROCEDURE — 1159F PR MEDICATION LIST DOCUMENTED IN MEDICAL RECORD: ICD-10-PCS | Mod: S$GLB,,, | Performed by: PODIATRIST

## 2020-06-11 PROCEDURE — 99999 PR PBB SHADOW E&M-EST. PATIENT-LVL IV: CPT | Mod: PBBFAC,,, | Performed by: PODIATRIST

## 2020-06-11 PROCEDURE — 99999 PR PBB SHADOW E&M-EST. PATIENT-LVL IV: ICD-10-PCS | Mod: PBBFAC,,, | Performed by: PODIATRIST

## 2020-06-11 RX ORDER — KETOCONAZOLE 20 MG/G
1 CREAM TOPICAL 2 TIMES DAILY
COMMUNITY
Start: 2020-06-02 | End: 2023-10-16

## 2020-06-11 NOTE — LETTER
June 14, 2020      Jose Mary MD  [address] [facility] [place] [address]           [facility] - Podiatry/Wound Care  [facility] [facility] [address]  Phone: [phone]  Fax: [phone]          Patient: Marifer Bernardo   MR Number: [id number]   YOB: 1950   Date of Visit: 6/11/2020       Dear Dr. Jose Mary:    Thank you for referring Marifer Bernardo to me for evaluation. Attached you will find relevant portions of my assessment and plan of care.    If you have questions, please do not hesitate to call me. I look forward to following Marifer Bernardo along with you.    Sincerely,    Osvaldo Arreola, DPRICK    Enclosure  CC:  No Recipients    If you would like to receive this communication electronically, please contact [email or web address] or [phone] to request more information on [facility] Link access.    For providers and/or their staff who would like to refer a patient to [facility], please contact us through our one-stop-shop provider referral line, [facility], at [phone].    If you feel you have received this communication in error or would no longer like to receive these types of communications, please e-mail [email or web address]

## 2020-06-14 NOTE — PROGRESS NOTES
Subjective:       Patient ID: Marifer Bernardo is a 70 y.o. female.    Chief Complaint: Follow-up, Foot Ulcer, Nail Problem, Foot Problem, and Diabetes Mellitus   Patient presents follow-up wound care following tendon repair and scar tissue debridement left hallux.  Follow-up  Associated symptoms include arthralgias, joint swelling and numbness.   Nail Problem  Associated symptoms include arthralgias, joint swelling and numbness.   Foot Ulcer  Associated symptoms include arthralgias, joint swelling and numbness.      Review of Systems   Musculoskeletal: Positive for arthralgias, gait problem and joint swelling.   Neurological: Positive for numbness.   All other systems reviewed and are negative.      Objective:      Physical Exam  Vitals signs and nursing note reviewed.   Constitutional:       Appearance: She is well-developed.   Cardiovascular:      Rate and Rhythm: Normal rate and regular rhythm.      Pulses:           Dorsalis pedis pulses are 1+ on the right side and 1+ on the left side.        Posterior tibial pulses are 1+ on the right side and 1+ on the left side.      Heart sounds: Normal heart sounds.   Pulmonary:      Effort: Pulmonary effort is normal.      Breath sounds: Normal breath sounds.   Musculoskeletal: Normal range of motion.         General: Tenderness and deformity present.      Left foot: Deformity present.        Feet:    Feet:      Right foot:      Protective Sensation: 4 sites tested. 3 sites sensed.      Left foot:      Protective Sensation: 4 sites tested. 3 sites sensed.      Skin integrity: Ulcer, skin breakdown, erythema and callus present.   Skin:     General: Skin is warm.      Capillary Refill: Capillary refill takes more than 3 seconds.      Findings: Erythema present.   Neurological:      Mental Status: She is alert.   Psychiatric:         Behavior: Behavior normal.         Thought Content: Thought content normal.         Judgment: Judgment normal.                                          Assessment:       1. Type 1 diabetes mellitus with diabetic neuropathy    2. Skin ulcer of left foot with fat layer exposed        Plan:       Patient presents today for follow-up of an ulceration on the plantar surface of the patient's left hallux.   Non excisional debridement was performed on the area the ulceration is approximately 2 cm long by 1 cm wide by 3 mm deep there is no active signs of infection there is a red healthy granular base noted within the central aspect of the wound.  Previously noted infection of the proximal nail fold left hallux appears resolved at this time all air if the amount edema have resolved.  Patient advised I am going to have her discontinue use of the Medihoney on the wound site patient has full range of motion dorsiflexion plantar flexion following tendon repair she does have an ulceration still on the plantar aspect of the left hallux she is going to clean the area every day with Dakin solution and has start to apply Promogran as directed plan to see the patient for follow-up in 2 weeks she had questioned me about stitching the area closed however I want to try to get this healed on its own she was in understanding and agreement with this today.  Face-to-face time equaled 25 min.  This note was created using [employer] voice recognition software that occasionally misinterpreted phrases or words.

## 2020-06-26 ENCOUNTER — TELEPHONE (OUTPATIENT)
Dept: PODIATRY | Facility: CLINIC | Age: 70
End: 2020-06-26

## 2020-06-26 ENCOUNTER — HOSPITAL ENCOUNTER (OUTPATIENT)
Facility: HOSPITAL | Age: 70
Discharge: HOME | End: 2020-06-28
Attending: FAMILY MEDICINE | Admitting: INTERNAL MEDICINE
Payer: MEDICARE

## 2020-06-26 DIAGNOSIS — L97.522 SKIN ULCER OF LEFT GREAT TOE WITH FAT LAYER EXPOSED: ICD-10-CM

## 2020-06-26 DIAGNOSIS — L03.116 CELLULITIS OF LEFT LOWER EXTREMITY: Primary | ICD-10-CM

## 2020-06-26 DIAGNOSIS — R60.0 LEG EDEMA: ICD-10-CM

## 2020-06-26 LAB
ALBUMIN SERPL BCP-MCNC: 3.7 G/DL (ref 3.5–5.2)
ALP SERPL-CCNC: 49 U/L (ref 55–135)
ALT SERPL W/O P-5'-P-CCNC: 22 U/L (ref 10–44)
ANION GAP SERPL CALC-SCNC: 14 MMOL/L (ref 8–16)
AST SERPL-CCNC: 25 U/L (ref 10–40)
BASOPHILS # BLD AUTO: 0.03 K/UL (ref 0–0.2)
BASOPHILS NFR BLD: 0.3 % (ref 0–1.9)
BILIRUB SERPL-MCNC: 0.4 MG/DL (ref 0.1–1)
BUN SERPL-MCNC: 27 MG/DL (ref 8–23)
CALCIUM SERPL-MCNC: 9.4 MG/DL (ref 8.7–10.5)
CHLORIDE SERPL-SCNC: 101 MMOL/L (ref 95–110)
CO2 SERPL-SCNC: 23 MMOL/L (ref 23–29)
CREAT SERPL-MCNC: 0.8 MG/DL (ref 0.5–1.4)
D DIMER PPP FEU-MCNC: 361 NG/ML (ref 100–400)
DIFFERENTIAL METHOD: ABNORMAL
EOSINOPHIL # BLD AUTO: 0 K/UL (ref 0–0.5)
EOSINOPHIL NFR BLD: 0.5 % (ref 0–8)
ERYTHROCYTE [DISTWIDTH] IN BLOOD BY AUTOMATED COUNT: 15.7 % (ref 11.5–14.5)
EST. GFR  (AFRICAN AMERICAN): >60 ML/MIN/1.73 M^2
EST. GFR  (NON AFRICAN AMERICAN): >60 ML/MIN/1.73 M^2
ESTIMATED AVG GLUCOSE: 151 MG/DL (ref 68–131)
GLUCOSE SERPL-MCNC: 118 MG/DL (ref 70–110)
HBA1C MFR BLD HPLC: 6.9 % (ref 4.5–6.2)
HCT VFR BLD AUTO: 41.4 % (ref 37–48.5)
HGB BLD-MCNC: 13 G/DL (ref 12–16)
IMM GRANULOCYTES # BLD AUTO: 0.07 K/UL (ref 0–0.04)
IMM GRANULOCYTES NFR BLD AUTO: 0.8 % (ref 0–0.5)
LACTATE SERPL-SCNC: 1.1 MMOL/L (ref 0.5–2.2)
LYMPHOCYTES # BLD AUTO: 1.2 K/UL (ref 1–4.8)
LYMPHOCYTES NFR BLD: 14 % (ref 18–48)
MCH RBC QN AUTO: 26.6 PG (ref 27–31)
MCHC RBC AUTO-ENTMCNC: 31.4 G/DL (ref 32–36)
MCV RBC AUTO: 85 FL (ref 82–98)
MONOCYTES # BLD AUTO: 0.8 K/UL (ref 0.3–1)
MONOCYTES NFR BLD: 8.9 % (ref 4–15)
NEUTROPHILS # BLD AUTO: 6.6 K/UL (ref 1.8–7.7)
NEUTROPHILS NFR BLD: 75.5 % (ref 38–73)
NRBC BLD-RTO: 0 /100 WBC
PLATELET # BLD AUTO: 273 K/UL (ref 150–350)
PMV BLD AUTO: 10 FL (ref 9.2–12.9)
POCT GLUCOSE: 108 MG/DL (ref 70–110)
POCT GLUCOSE: 124 MG/DL (ref 70–110)
POCT GLUCOSE: 174 MG/DL (ref 70–110)
POTASSIUM SERPL-SCNC: 3.9 MMOL/L (ref 3.5–5.1)
PROT SERPL-MCNC: 7.9 G/DL (ref 6–8.4)
RBC # BLD AUTO: 4.88 M/UL (ref 4–5.4)
SARS-COV-2 RDRP RESP QL NAA+PROBE: NEGATIVE
SODIUM SERPL-SCNC: 138 MMOL/L (ref 136–145)
WBC # BLD AUTO: 8.72 K/UL (ref 3.9–12.7)

## 2020-06-26 PROCEDURE — 73700 CT LOWER EXTREMITY W/O DYE: CPT | Mod: 26,76,LT, | Performed by: RADIOLOGY

## 2020-06-26 PROCEDURE — U0002 COVID-19 LAB TEST NON-CDC: HCPCS

## 2020-06-26 PROCEDURE — 85025 COMPLETE CBC W/AUTO DIFF WBC: CPT

## 2020-06-26 PROCEDURE — 63600175 PHARM REV CODE 636 W HCPCS: Performed by: INTERNAL MEDICINE

## 2020-06-26 PROCEDURE — 99220 PR INITIAL OBSERVATION CARE,LEVL III: ICD-10-PCS | Mod: ,,, | Performed by: INTERNAL MEDICINE

## 2020-06-26 PROCEDURE — 36415 COLL VENOUS BLD VENIPUNCTURE: CPT

## 2020-06-26 PROCEDURE — 80053 COMPREHEN METABOLIC PANEL: CPT

## 2020-06-26 PROCEDURE — 99220 PR INITIAL OBSERVATION CARE,LEVL III: CPT | Mod: ,,, | Performed by: INTERNAL MEDICINE

## 2020-06-26 PROCEDURE — 25000003 PHARM REV CODE 250: Performed by: INTERNAL MEDICINE

## 2020-06-26 PROCEDURE — G0378 HOSPITAL OBSERVATION PER HR: HCPCS

## 2020-06-26 PROCEDURE — 73700 CT LOWER EXTREMITY W/O DYE: CPT | Mod: TC,LT

## 2020-06-26 PROCEDURE — 83036 HEMOGLOBIN GLYCOSYLATED A1C: CPT

## 2020-06-26 PROCEDURE — 82962 GLUCOSE BLOOD TEST: CPT

## 2020-06-26 PROCEDURE — 96372 THER/PROPH/DIAG INJ SC/IM: CPT | Mod: 59 | Performed by: INTERNAL MEDICINE

## 2020-06-26 PROCEDURE — 87040 BLOOD CULTURE FOR BACTERIA: CPT

## 2020-06-26 PROCEDURE — 87147 CULTURE TYPE IMMUNOLOGIC: CPT

## 2020-06-26 PROCEDURE — 99285 EMERGENCY DEPT VISIT HI MDM: CPT | Mod: 25

## 2020-06-26 PROCEDURE — 85379 FIBRIN DEGRADATION QUANT: CPT

## 2020-06-26 PROCEDURE — 87070 CULTURE OTHR SPECIMN AEROBIC: CPT

## 2020-06-26 PROCEDURE — 63600175 PHARM REV CODE 636 W HCPCS: Performed by: HOSPITALIST

## 2020-06-26 PROCEDURE — 96375 TX/PRO/DX INJ NEW DRUG ADDON: CPT | Performed by: INTERNAL MEDICINE

## 2020-06-26 PROCEDURE — 83605 ASSAY OF LACTIC ACID: CPT

## 2020-06-26 PROCEDURE — 25000003 PHARM REV CODE 250: Performed by: FAMILY MEDICINE

## 2020-06-26 PROCEDURE — 63600175 PHARM REV CODE 636 W HCPCS: Performed by: FAMILY MEDICINE

## 2020-06-26 PROCEDURE — 73700 CT LEG (TIBIA-FIBULA) WITHOUT CONTRAST LEFT: ICD-10-PCS | Mod: 26,LT,, | Performed by: RADIOLOGY

## 2020-06-26 PROCEDURE — 25000003 PHARM REV CODE 250: Performed by: HOSPITALIST

## 2020-06-26 PROCEDURE — 73700 CT LOWER EXTREMITY W/O DYE: CPT | Mod: 26,LT,, | Performed by: RADIOLOGY

## 2020-06-26 RX ORDER — ATENOLOL 25 MG/1
100 TABLET ORAL DAILY
Status: DISCONTINUED | OUTPATIENT
Start: 2020-06-27 | End: 2020-06-26

## 2020-06-26 RX ORDER — INSULIN ASPART 100 [IU]/ML
30 INJECTION, SUSPENSION SUBCUTANEOUS 2 TIMES DAILY WITH MEALS
Status: DISCONTINUED | OUTPATIENT
Start: 2020-06-26 | End: 2020-06-26

## 2020-06-26 RX ORDER — SIMVASTATIN 10 MG/1
20 TABLET, FILM COATED ORAL
Status: DISCONTINUED | OUTPATIENT
Start: 2020-06-26 | End: 2020-06-28 | Stop reason: HOSPADM

## 2020-06-26 RX ORDER — VANCOMYCIN HCL IN 5 % DEXTROSE 1G/250ML
1000 PLASTIC BAG, INJECTION (ML) INTRAVENOUS
Status: COMPLETED | OUTPATIENT
Start: 2020-06-26 | End: 2020-06-26

## 2020-06-26 RX ORDER — GABAPENTIN 100 MG/1
100 CAPSULE ORAL 2 TIMES DAILY
Status: DISCONTINUED | OUTPATIENT
Start: 2020-06-26 | End: 2020-06-28 | Stop reason: HOSPADM

## 2020-06-26 RX ORDER — ASPIRIN 81 MG/1
81 TABLET ORAL DAILY
Status: DISCONTINUED | OUTPATIENT
Start: 2020-06-27 | End: 2020-06-28 | Stop reason: HOSPADM

## 2020-06-26 RX ORDER — SODIUM CHLORIDE 0.9 % (FLUSH) 0.9 %
10 SYRINGE (ML) INJECTION
Status: DISCONTINUED | OUTPATIENT
Start: 2020-06-26 | End: 2020-06-28 | Stop reason: HOSPADM

## 2020-06-26 RX ORDER — ONDANSETRON 2 MG/ML
4 INJECTION INTRAMUSCULAR; INTRAVENOUS EVERY 4 HOURS PRN
Status: DISCONTINUED | OUTPATIENT
Start: 2020-06-26 | End: 2020-06-28 | Stop reason: HOSPADM

## 2020-06-26 RX ORDER — ATENOLOL 25 MG/1
100 TABLET ORAL NIGHTLY
Status: DISCONTINUED | OUTPATIENT
Start: 2020-06-26 | End: 2020-06-28 | Stop reason: HOSPADM

## 2020-06-26 RX ORDER — FENOFIBRATE 48 MG/1
145 TABLET, FILM COATED ORAL DAILY
Status: DISCONTINUED | OUTPATIENT
Start: 2020-06-27 | End: 2020-06-28 | Stop reason: HOSPADM

## 2020-06-26 RX ORDER — LISINOPRIL 2.5 MG/1
5 TABLET ORAL DAILY
Status: DISCONTINUED | OUTPATIENT
Start: 2020-06-27 | End: 2020-06-27

## 2020-06-26 RX ORDER — ENOXAPARIN SODIUM 100 MG/ML
40 INJECTION SUBCUTANEOUS EVERY 24 HOURS
Status: DISCONTINUED | OUTPATIENT
Start: 2020-06-26 | End: 2020-06-28 | Stop reason: HOSPADM

## 2020-06-26 RX ORDER — OMEGA-3-ACID ETHYL ESTERS 1 G/1
2 CAPSULE, LIQUID FILLED ORAL DAILY
Status: DISCONTINUED | OUTPATIENT
Start: 2020-06-27 | End: 2020-06-28 | Stop reason: HOSPADM

## 2020-06-26 RX ORDER — INSULIN ASPART 100 [IU]/ML
70 INJECTION, SUSPENSION SUBCUTANEOUS
Status: DISCONTINUED | OUTPATIENT
Start: 2020-06-26 | End: 2020-06-28 | Stop reason: HOSPADM

## 2020-06-26 RX ORDER — ACETAMINOPHEN 325 MG/1
650 TABLET ORAL EVERY 4 HOURS PRN
Status: DISCONTINUED | OUTPATIENT
Start: 2020-06-26 | End: 2020-06-28 | Stop reason: HOSPADM

## 2020-06-26 RX ORDER — CIPROFLOXACIN 2 MG/ML
400 INJECTION, SOLUTION INTRAVENOUS
Status: DISCONTINUED | OUTPATIENT
Start: 2020-06-26 | End: 2020-06-28 | Stop reason: HOSPADM

## 2020-06-26 RX ORDER — INSULIN ASPART 100 [IU]/ML
60 INJECTION, SUSPENSION SUBCUTANEOUS
Status: DISCONTINUED | OUTPATIENT
Start: 2020-06-27 | End: 2020-06-28 | Stop reason: HOSPADM

## 2020-06-26 RX ORDER — INSULIN ASPART 100 [IU]/ML
30 INJECTION, SUSPENSION SUBCUTANEOUS 2 TIMES DAILY WITH MEALS
Status: DISCONTINUED | OUTPATIENT
Start: 2020-06-27 | End: 2020-06-26

## 2020-06-26 RX ORDER — GLUCAGON 1 MG
1 KIT INJECTION
Status: DISCONTINUED | OUTPATIENT
Start: 2020-06-26 | End: 2020-06-28 | Stop reason: HOSPADM

## 2020-06-26 RX ORDER — MORPHINE SULFATE 4 MG/ML
2 INJECTION, SOLUTION INTRAMUSCULAR; INTRAVENOUS EVERY 4 HOURS PRN
Status: DISCONTINUED | OUTPATIENT
Start: 2020-06-26 | End: 2020-06-28 | Stop reason: HOSPADM

## 2020-06-26 RX ORDER — PANTOPRAZOLE SODIUM 40 MG/10ML
40 INJECTION, POWDER, LYOPHILIZED, FOR SOLUTION INTRAVENOUS DAILY
Status: DISCONTINUED | OUTPATIENT
Start: 2020-06-27 | End: 2020-06-28 | Stop reason: HOSPADM

## 2020-06-26 RX ORDER — VANCOMYCIN HCL IN 5 % DEXTROSE 1G/250ML
1000 PLASTIC BAG, INJECTION (ML) INTRAVENOUS
Status: DISCONTINUED | OUTPATIENT
Start: 2020-06-27 | End: 2020-06-28 | Stop reason: HOSPADM

## 2020-06-26 RX ORDER — MECLIZINE HCL 12.5 MG 12.5 MG/1
25 TABLET ORAL 2 TIMES DAILY PRN
Status: DISCONTINUED | OUTPATIENT
Start: 2020-06-26 | End: 2020-06-28 | Stop reason: HOSPADM

## 2020-06-26 RX ADMIN — GABAPENTIN 400 MG: 100 CAPSULE ORAL at 08:06

## 2020-06-26 RX ADMIN — INSULIN ASPART 70 UNITS: 100 INJECTION, SUSPENSION SUBCUTANEOUS at 09:06

## 2020-06-26 RX ADMIN — CIPROFLOXACIN 400 MG: 2 INJECTION, SOLUTION INTRAVENOUS at 06:06

## 2020-06-26 RX ADMIN — DEXTROSE MONOHYDRATE 1000 MG: 50 INJECTION, SOLUTION INTRAVENOUS at 04:06

## 2020-06-26 RX ADMIN — ENOXAPARIN SODIUM 40 MG: 40 INJECTION SUBCUTANEOUS at 06:06

## 2020-06-26 RX ADMIN — CEFTRIAXONE 2 G: 2 INJECTION, SOLUTION INTRAVENOUS at 01:06

## 2020-06-26 RX ADMIN — SIMVASTATIN 20 MG: 10 TABLET, FILM COATED ORAL at 09:06

## 2020-06-26 RX ADMIN — ATENOLOL 100 MG: 25 TABLET ORAL at 09:06

## 2020-06-26 NOTE — PLAN OF CARE
06/26/20 1803   BLOCK Message   Medicare Outpatient and Observation Notification regarding financial responsibility Given to patient/caregiver;Explained to patient/caregiver;Signed/date by patient/caregiver   Date BLOCK was signed 06/26/20   Time BLOCK was signed [id number]

## 2020-06-26 NOTE — PROGRESS NOTES
Pharmacokinetic Initial Assessment: IV Vancomycin    Assessment/Plan:    Initiate intravenous vancomycin with loading dose of 1000 mg once followed by a maintenance dose of vancomycin 1000 mg IV every 12 hours  Desired empiric serum trough concentration is 10 to 20 mcg/mL  Draw vancomycin trough level 30 min prior to fourth dose on 06/28/20  at approximately 0330  Pharmacy will continue to follow and monitor vancomycin.      Please contact pharmacy at extension [phone] with any questions regarding this assessment.     Thank you for the consult,   Bakari Jiméneze       Patient brief summary:  Marifer Bernardo is a 70 y.o. female initiated on antimicrobial therapy with IV Vancomycin for treatment of suspected skin & soft tissue infection    Drug Allergies:   Review of patient's allergies indicates:   Allergen Reactions    Banana Hives    Penicillins Hives       Actual Body Weight:   118.3 kg    Renal Function:   Estimated Creatinine Clearance: 82.7 mL/min (based on SCr of 0.8 mg/dL).,     Dialysis Method (if applicable):  N/A    CBC (last 72 hours):  Recent Labs   Lab Result Units 06/26/20  1204 06/26/20  1704   WBC K/uL 8.72  --    Hemoglobin g/dL 13.0  --    Hemoglobin A1C %  --  6.9*   Hematocrit % 41.4  --    Platelets K/uL 273  --    Gran% % 75.5*  --    Lymph% % 14.0*  --    Mono% % 8.9  --    Eosinophil% % 0.5  --    Basophil% % 0.3  --    Differential Method  Automated  --        Metabolic Panel (last 72 hours):  Recent Labs   Lab Result Units 06/26/20  1204   Sodium mmol/L 138   Potassium mmol/L 3.9   Chloride mmol/L 101   CO2 mmol/L 23   Glucose mg/dL 118*   BUN, Bld mg/dL 27*   Creatinine mg/dL 0.8   Albumin g/dL 3.7   Total Bilirubin mg/dL 0.4   Alkaline Phosphatase U/L 49*   AST U/L 25   ALT U/L 22       Drug levels (last 3 results):  No results for input(s): VANCOMYCINRA, VANCOMYCINPE, VANCOMYCINTR in the last 72 hours.    Microbiologic Results:  Microbiology Results (last 7 days)       Procedure Component  Value Units Date/Time    Aerobic culture (Specify Source) **CANNOT BE ORDERED AS STAT** [[id number]] Collected: 06/26/20 1551    Order Status: Sent Specimen: Wound from Foot, Left Updated: 06/26/20 1556    Aerobic culture (Specify Source) **CANNOT BE ORDERED AS STAT** [[id number]]     Order Status: Canceled Specimen: Wound     Blood culture #2 **CANNOT BE ORDERED STAT** [[id number]] Collected: 06/26/20 1232    Order Status: Sent Specimen: Blood from Peripheral, Right Hand Updated: 06/26/20 1232    Blood culture #1 **CANNOT BE ORDERED STAT** [[id number]] Collected: 06/26/20 1204    Order Status: Sent Specimen: Blood from Peripheral, Forearm, Left Updated: 06/26/20 1219

## 2020-06-26 NOTE — ED PROVIDER NOTES
Encounter Date: 2020       History     Chief Complaint   Patient presents with    Leg Swelling     left leg redness and swelling      70-year-old female presents complaining of swelling redness to the left leg onset approximately 2 days ago patient has had a chronic ulceration of the left great toe that has been followed by podiatrist Dr. Osvaldo Arreola, patient denies any fever or fatigue she does have a history of diabetes hand arthritis and apparently some degree of peripheral neuropathy, finally she endorses hypertension and has had a previous blood transfusion        Review of patient's allergies indicates:   Allergen Reactions    Banana Hives    Penicillins Hives     Past Medical History:   Diagnosis Date    Arthritis     Diabetes mellitus, type 2     Encounter for blood transfusion     Hypertension     PONV (postoperative nausea and vomiting)     Wears glasses      Past Surgical History:   Procedure Laterality Date    FOOT MASS EXCISION Left 2020    Procedure: EXCISION, MASS, FOOT;  Surgeon: Osvaldo Arreola DPM;  Location: [facility] OR;  Service: Podiatry;  Laterality: Left;    HIP ARTHROPLASTY Right 2019    Procedure: ARTHROPLASTY, HIP;  Surgeon: Jose Nicole Jr., MD;  Location: [facility] OR;  Service: Orthopedics;  Laterality: Right;  Synvisc    HIP SURGERY      HYSTERECTOMY      JOINT REPLACEMENT Left     HIP    PARTIAL HYSTERECTOMY      partical toe amputation      REPAIR OF TENDON OF LOWER EXTREMITY Left 2020    Procedure: REPAIR, TENDON, LOWER EXTREMITY;  Surgeon: Osvaldo Arreola DPM;  Location: [facility] OR;  Service: Podiatry;  Laterality: Left;  tendon repair lt ft big toe     History reviewed. No pertinent family history.  Social History     Tobacco Use    Smoking status: Former Smoker     Quit date: 1979     Years since quittin.4    Smokeless tobacco: Never Used   Substance Use Topics    Alcohol use: No     Frequency: Never    Drug use: No     Review of  Systems   Constitutional: Negative for fever.   HENT: Negative for sore throat.    Respiratory: Negative for shortness of breath.    Cardiovascular: Negative for chest pain.   Gastrointestinal: Negative for nausea.   Genitourinary: Negative for dysuria.   Musculoskeletal: Positive for gait problem. Negative for back pain and neck pain.   Skin: Negative for rash.   Neurological: Negative for weakness.   Hematological: Does not bruise/bleed easily.       Physical Exam     Initial Vitals [06/26/20 1132]   BP Pulse Resp Temp SpO2   (!) 142/65 83 20 98.2 °F (36.8 °C) 96 %      MAP       --         Physical Exam    Nursing note and vitals reviewed.  Constitutional: She appears well-developed and well-nourished. She is not diaphoretic. No distress.   HENT:   Head: Normocephalic and atraumatic.   Right Ear: External ear normal.   Left Ear: External ear normal.   Eyes: Pupils are equal, round, and reactive to light. Right eye exhibits no discharge. Left eye exhibits no discharge.   Neck: No tracheal deviation present. No JVD present.   Cardiovascular: Exam reveals no friction rub.    No murmur heard.  Pulmonary/Chest: No stridor. No respiratory distress. She has no wheezes. She has no rales.   Abdominal: Bowel sounds are normal. She exhibits no distension.   Musculoskeletal: Normal range of motion. Tenderness and edema present.      Comments: Positive circumferential erythema to the left leg there is a deep ulceration to the plantar aspect of the left great toe which dissect down to the deep fascia there is minimal drainage, the erythema and cellulitis extend to above the knee negative Homans sign   Neurological: She is alert.   Skin: Skin is warm.   Psychiatric: She has a normal mood and affect.         ED Course   Procedures  Labs Reviewed   CBC W/ AUTO DIFFERENTIAL - Abnormal; Notable for the following components:       Result Value    Mean Corpuscular Hemoglobin 26.6 (*)     Mean Corpuscular Hemoglobin Conc 31.4 (*)      RDW 15.7 (*)     Immature Granulocytes 0.8 (*)     Immature Grans (Abs) 0.07 (*)     Gran% 75.5 (*)     Lymph% 14.0 (*)     All other components within normal limits   COMPREHENSIVE METABOLIC PANEL - Abnormal; Notable for the following components:    Glucose 118 (*)     BUN, Bld 27 (*)     Alkaline Phosphatase 49 (*)     All other components within normal limits   CULTURE, BLOOD   CULTURE, BLOOD   CULTURE, AEROBIC  (SPECIFY SOURCE)   LACTIC ACID, PLASMA   D DIMER, QUANTITATIVE   SARS-COV-2 RNA AMPLIFICATION, QUAL   POCT GLUCOSE          Imaging Results    None          Medical Decision Making:   ED Management:  Case discussed with podiatrist Dr. Osvaldo Arreola, case discussed with  patient will be admitted for IV antibiotics vancomycin and Cipro, a wound culture of the deep ulcer bed was taken                                 Clinical Impression:       ICD-10-CM ICD-9-CM   1. Cellulitis of left lower extremity  L03.116 682.6   2. Skin ulcer of left great toe with fat layer exposed  L97.522 707.15             ED Disposition Condition    Admit                           Pavel Childs MD  06/26/20 1553       Pavel Childs MD  06/26/20 1555       Pavel Childs MD  06/26/20 1556

## 2020-06-26 NOTE — H&P
[facility] Medicine  History & Physical    Patient Name: Marifer Bernardo  MRN: [id number]  Admission Date: 6/26/2020  Attending Physician: Randolph Ho MD  Primary Care Provider: Jose Mary MD         Patient information was obtained from patient and ER records.     Subjective:     Principal Problem:Cellulitis of left lower extremity    Chief Complaint:   Chief Complaint   Patient presents with    Leg Swelling     left leg redness and swelling         HPI: Patient is a 70-year-old female that presented to the emergency department complaining of a 2 day history left leg swelling and redness.  Patient stated that she noticed this 2 days ago and yesterday became worse.  She denies any fever chills nausea or vomiting but states that she felt that it looked worse and felt that she needed to have it evaluated.  Patient also has a 1 cm x 1 cm ulcer on the plantar surface of her left hallux.  This has been there for some time she states has never healed from a previous surgery.  She has seen Dr. Arreola in the past for this and states that she sees him every 2-3 weeks.  Patient has a past medical history of type 2 diabetes mellitus, hypertension, and osteoarthritis.    Past Medical History:   Diagnosis Date    Arthritis     Diabetes mellitus, type 2     Encounter for blood transfusion     Hypertension     PONV (postoperative nausea and vomiting)     Wears glasses        Past Surgical History:   Procedure Laterality Date    FOOT MASS EXCISION Left 2/7/2020    Procedure: EXCISION, MASS, FOOT;  Surgeon: Osvaldo Arreola DPM;  Location: [facility] OR;  Service: Podiatry;  Laterality: Left;    HIP ARTHROPLASTY Right 1/22/2019    Procedure: ARTHROPLASTY, HIP;  Surgeon: Jose Nicole Jr., MD;  Location: [facility] OR;  Service: Orthopedics;  Laterality: Right;  Synvisc    HIP SURGERY      HYSTERECTOMY      JOINT REPLACEMENT Left     HIP    PARTIAL HYSTERECTOMY      partical toe  "amputation      REPAIR OF TENDON OF LOWER EXTREMITY Left 2/7/2020    Procedure: REPAIR, TENDON, LOWER EXTREMITY;  Surgeon: Osvaldo Arreola DPM;  Location: [facility] OR;  Service: Podiatry;  Laterality: Left;  tendon repair lt ft big toe       Review of patient's allergies indicates:   Allergen Reactions    Banana Hives    Penicillins Hives       No current facility-administered medications on file prior to encounter.      Current Outpatient Medications on File Prior to Encounter   Medication Sig    aspirin (ECOTRIN) 81 MG EC tablet Take 81 mg by mouth once daily.    atenolol (TENORMIN) 100 MG tablet atenolol 100 mg tablet    BD ULTRA-FINE LILI PEN NEEDLE 32 gauge x 5/32" Ndle     dapagliflozin-metformin (XIGDUO XR) 5-1,000 mg TBph Xigduo XR 5 mg-1,000 mg tablet,extended release   Take 1 tablet twice a day by oral route for 90 days.    diclofenac (VOLTAREN) 75 MG EC tablet 2 (two) times daily.     fenofibrate (TRICOR) 145 MG tablet fenofibrate nanocrystallized 145 mg tablet    gabapentin (NEURONTIN) 100 MG capsule gabapentin 100 mg capsule    gabapentin (NEURONTIN) 400 MG capsule gabapentin 400 mg capsule    insulin aspart protamine-insulin aspart (NOVOLOG MIX 70-30FLEXPEN U-100) 100 unit/mL (70-30) InPn pen Novolog Mix 70-30 FlexPen U-100 Insulin 100 unit/mL subcutaneous pen    ketoconazole (NIZORAL) 2 % cream 1 application 2 (two) times daily. Apply to affected area    meclizine (ANTIVERT) 25 mg tablet meclizine 25 mg tablet    omega-3 acid ethyl esters (LOVAZA) 1 gram capsule Take 2 g by mouth once daily.     quinapril-hydrochlorothiazide (ACCURETIC) 20-12.5 mg per tablet quinapril 20 mg-hydrochlorothiazide 12.5 mg tablet    simvastatin (ZOCOR) 20 MG tablet every other day. HS    TRUE METRIX GLUCOSE TEST STRIP Strp     VICTOZA 3-NANCY 0.6 mg/0.1 mL (18 mg/3 mL) PnIj 2 (two) times daily. 1.8 MG AM AND 1.2 MG HS     Family History     None        Tobacco Use    Smoking status: Former Smoker     Quit " date: 1979     Years since quittin.4    Smokeless tobacco: Never Used   Substance and Sexual Activity    Alcohol use: No     Frequency: Never    Drug use: No    Sexual activity: Not on file     Review of Systems   Constitutional: Positive for fatigue. Negative for activity change, appetite change and fever.   HENT: Negative for congestion, ear discharge, mouth sores, nosebleeds, rhinorrhea, sinus pressure, sinus pain and tinnitus.    Eyes: Negative.  Negative for pain, redness and itching.   Respiratory: Negative for apnea, cough, choking, chest tightness, shortness of breath, wheezing and stridor.    Cardiovascular: Negative for chest pain, palpitations and leg swelling.   Gastrointestinal: Negative for abdominal distention, abdominal pain, anal bleeding, blood in stool, constipation and diarrhea.   Endocrine: Negative.    Genitourinary: Negative for difficulty urinating, flank pain, frequency and urgency.   Musculoskeletal: Positive for arthralgias. Negative for back pain, gait problem and myalgias.   Skin: Positive for color change and wound. Negative for pallor.   Allergic/Immunologic: Negative.    Neurological: Negative for dizziness, facial asymmetry, weakness, light-headedness and headaches.   Hematological: Negative for adenopathy. Does not bruise/bleed easily.   Psychiatric/Behavioral: Negative for confusion. The patient is nervous/anxious.      Objective:     Vital Signs (Most Recent):  Temp: 97.8 °F (36.6 °C) (20 1640)  Pulse: 80 (20 1640)  Resp: 18 (20 1640)  BP: 137/65 (20 1640)  SpO2: 97 % (20 1640) Vital Signs (24h Range):  Temp:  [97.8 °F (36.6 °C)-98.2 °F (36.8 °C)] 97.8 °F (36.6 °C)  Pulse:  [72-83] 80  Resp:  [18-20] 18  SpO2:  [91 %-97 %] 97 %  BP: (136-173)/() 137/65     Weight: 118.3 kg (260 lb 12.9 oz)  Body mass index is 44.77 kg/m².    Physical Exam  Vitals signs and nursing note reviewed.   Constitutional:       Appearance: She is  well-developed.   HENT:      Head: Normocephalic and atraumatic.   Eyes:      Pupils: Pupils are equal, round, and reactive to light.   Neck:      Musculoskeletal: Normal range of motion and neck supple.      Thyroid: No thyromegaly.      Trachea: No tracheal deviation.   Cardiovascular:      Rate and Rhythm: Normal rate and regular rhythm.      Heart sounds: Normal heart sounds.   Pulmonary:      Effort: Pulmonary effort is normal.      Breath sounds: Normal breath sounds.   Abdominal:      General: Bowel sounds are normal. There is no distension.      Palpations: Abdomen is soft.      Tenderness: There is no abdominal tenderness. There is no guarding or rebound.   Musculoskeletal: Normal range of motion.         General: Swelling and tenderness present.      Left lower leg: Edema present.      Comments: Erythema and swelling of the left lower extremity to the level of the knee.  Also 1 x 1 cm ulceration on the plantar surface a of the hallux on the left foot.   Lymphadenopathy:      Cervical: No cervical adenopathy.   Skin:     General: Skin is warm and dry.      Capillary Refill: Capillary refill takes less than 2 seconds.   Neurological:      General: No focal deficit present.      Mental Status: She is alert and oriented to person, place, and time.   Psychiatric:         Behavior: Behavior normal.         Thought Content: Thought content normal.         Judgment: Judgment normal.           CRANIAL NERVES     CN III, IV, VI   Pupils are equal, round, and reactive to light.       Significant Labs:   Recent Lab Results       06/26/20  1710   06/26/20  1549   06/26/20  1204   06/26/20  1139        Albumin     3.7       Alkaline Phosphatase     49       ALT     22       Anion Gap     14       AST     25       Baso #     0.03       Basophil%     0.3       BILIRUBIN TOTAL     0.4  Comment:  For infants and newborns, interpretation of results should be based  on gestational age, weight and in agreement with  clinical  observations.  Premature Infant recommended reference ranges:  Up to 24 hours.............<8.0 mg/dL  Up to 48 hours............<12.0 mg/dL  3-5 days..................<15.0 mg/dL  6-29 days.................<15.0 mg/dL         BUN, Bld     27       Calcium     9.4       Chloride     101       CO2     23       Creatinine     0.8       D-Dimer     361  Comment:  The quantitative D-dimer assay should be used as an aid in the   diagnosis of   deep vein thrombosis and pulmonary embolism. The result of the test   should   be evaluated in the context of all the clinical and laboratory data   available. In those instances where the laboratory result does not   agree   with the clinical evaluation, additional tests should be performed   accordingly.         Differential Method     Automated       eGFR if      >60.0       eGFR if non      >60.0  Comment:  Calculation used to obtain the estimated glomerular filtration  rate (eGFR) is the CKD-EPI equation.          Eos #     0.0       Eosinophil%     0.5       Glucose     118       Gran # (ANC)     6.6       Gran%     75.5       Hematocrit     41.4       Hemoglobin     13.0       Immature Grans (Abs)     0.07  Comment:  Mild elevation in immature granulocytes is non specific and   can be seen in a variety of conditions including stress response,   acute inflammation, trauma and pregnancy. Correlation with other   laboratory and clinical findings is essential.         Immature Granulocytes     0.8       Lactate, Kem     1.1  Comment:  Falsely low lactic acid results can be found in samples   containing >=13.0 mg/dL total bilirubin and/or >=3.5 mg/dL   direct bilirubin.         Lymph #     1.2       Lymph%     14.0       MCH     26.6       MCHC     31.4       MCV     85       Mono #     0.8       Mono%     8.9       MPV     10.0       nRBC     0       Platelets     273       POCT Glucose 124     108     Potassium     3.9       PROTEIN  TOTAL     7.9       RBC     4.88       RDW     15.7       SARS-CoV-2 RNA, Amplification, Qual   Negative  Comment:  This test utilizes isothermal nucleic acid amplification   technology to detect the SARS-CoV-2 RdRp nucleic acid segment.   The analytical sensitivity (limit of detection) is 125 genome   equivalents/mL.   A POSITIVE result implies infection with the SARS-CoV-2 virus;  the patient is presumed to be contagious.    A NEGATIVE result means that SARS-CoV-2 nucleic acids are not  present above the limit of detection. A NEGATIVE result should be   treated as presumptive. It does not rule out the possibility of   COVID-19 and should not be the sole basis for treatment decisions.   If COVID-19 is strongly suspected based on clinical and exposure   history, re-testing using an alternate molecular assay should be   considered.   This test is only for use under the Food and Drug   Administration s Emergency Use Authorization (EUA).   Commercial kits are provided by [employer].   Performance characteristics of the EUA have been independently  verified by [facility] Department of  Pathology and Laboratory Medicine.   _________________________________________________________________  The ID NOW COVID-19 Letter of Authorization, along with the   authorized Fact Sheet for Healthcare Providers, the authorized Fact  Sheet for Patients, and authorized labeling are available on the FDA   website:  www.fda.gov/MedicalDevices/Safety/EmergencySituations/nwf237418.htm           Sodium     138       WBC     8.72           All pertinent labs within the past 24 hours have been reviewed.    Significant Imaging: I have reviewed and interpreted all pertinent imaging results/findings within the past 24 hours.    Assessment/Plan:     * Cellulitis of left lower extremity  06/26/2020:  Admit to medical-surgical unit.  Begin on ciprofloxacin 400 mg IV twice daily  Begin on vancomycin dosed by pharmacy  Follow-up CT  scan of the left lower extremity to evaluate for bony erosion  Ultrasound of the left lower extremity to evaluate for thrombosis.  Continue other symptomatic medications as needed.  Repeat labs in the a.m.  DVT prophylaxis.      Hypertension  06/26/2020:  Continue home medications for control of blood pressure.      Diabetes mellitus, type 2  Continue basal insulin  Continue sliding scale insulin for glucose control  Check hemoglobin A1c        VTE Risk Mitigation (From admission, onward)         Ordered     enoxaparin injection 40 mg  Every 24 hours      06/26/20 1653     IP VTE LOW RISK PATIENT  Once      06/26/20 1653     Place sequential compression device  Until discontinued      06/26/20 1653                   Randolph Ho MD  Department of Hospital Medicine   [facility]

## 2020-06-26 NOTE — ASSESSMENT & PLAN NOTE
06/26/2020:  Admit to medical-surgical unit.  Begin on ciprofloxacin 400 mg IV twice daily  Begin on vancomycin dosed by pharmacy  Follow-up CT scan of the left lower extremity to evaluate for bony erosion  Ultrasound of the left lower extremity to evaluate for thrombosis.  Continue other symptomatic medications as needed.  Repeat labs in the a.m.  DVT prophylaxis.

## 2020-06-26 NOTE — ASSESSMENT & PLAN NOTE
Continue basal insulin  Continue sliding scale insulin for glucose control  Check hemoglobin A1c

## 2020-06-26 NOTE — TELEPHONE ENCOUNTER
Pt stated left leg has swelling, redness and hot from toes to above calf. Pt very concerned.Has had for few days but progressing. Instructed pt to go to emergency room as Dr. Arreola in not in today.     Note:  Pt had mole removed about two weeks ago and has been on an antibiotic she believes to be Doxycycline Hyclate. Stated mole unrelated to this issue but did want doctor to know she has been on antibiotics.

## 2020-06-26 NOTE — HOSPITAL COURSE
Emergency department evaluation:  COVID swab negative.  Chem profile glucose 118 BUN 27 creatinine 0.8 alkaline phosphatase 49 GFR greater than 60 all other values negativeD-dimer of 361  White blood cell count normal at 8.7 hemoglobin 13 hematocrit 41 platelets 273 and differential showing 75% granulocytes 14 facet lymphocytes  Blood cultures are pending    06/27/2020:  Patient has no new complaints this morning.  States she feels that her left leg is less swollen.  Patient was getting ultrasound of the left lower extremity at the time of my visit.  Labs are unremarkable with procalcitonin normal at 0.05  Urinalysis showing trace white blood cells with 15 white blood cells.  Leukocyte trace  White blood cell count normal at 8.7 with normal hemoglobin hematocrit and platelets 273 with normal differential  Comprehensive metabolic panel normal except for glucose 124 BUN 32 creatinine 0.8 and total bilirubin normal with alkaline phosphatase 46  Vital signs have been stable and patient is improving with therapy.  Continue antibiotics and possible discharge tomorrow.  Consult with Podiatry is pending due to diabetic foot ulcer on the plantar surface of the hallux    06/28/2020:  Patient is improving with less erythema to the left lower extremity.  Doppler ultrasound of the left lower quadrant was negative for any thrombosis.  CT of the left lower extremity revealed no evidence of osteomyelitis.  Patient has been on ciprofloxacin and vancomycin.  Has responded well.  Patient will be discharged to home today to continue ciprofloxacin 500 mg b.i.d. for 10 days and clindamycin 300 mg q.6 hours for 10 days.  Patient was seen by Dr. Arreola, podiatry, and will follow-up with Dr. Arreola next week in his office.  Otherwise patient will continue all other medications as previously prescribed.

## 2020-06-26 NOTE — HPI
Patient is a 70-year-old female that presented to the emergency department complaining of a 2 day history left leg swelling and redness.  Patient stated that she noticed this 2 days ago and yesterday became worse.  She denies any fever chills nausea or vomiting but states that she felt that it looked worse and felt that she needed to have it evaluated.  Patient also has a 1 cm x 1 cm ulcer on the plantar surface of her left hallux.  This has been there for some time she states has never healed from a previous surgery.  She has seen Dr. Arreola in the past for this and states that she sees him every 2-3 weeks.  Patient has a past medical history of type 2 diabetes mellitus, hypertension, and osteoarthritis.

## 2020-06-26 NOTE — PLAN OF CARE
06/26/20 1759   Discharge Assessment   Assessment Type Discharge Planning Assessment   Confirmed/corrected address and phone number on facesheet? Yes   Assessment information obtained from? Patient   Expected Length of Stay (days) 2   Communicated expected length of stay with patient/caregiver yes   Prior to hospitilization cognitive status: Alert/Oriented   Prior to hospitalization functional status: Independent;Assistive Equipment   Current cognitive status: Alert/Oriented   Current Functional Status: Independent;Assistive Equipment   Lives With spouse   Able to Return to Prior Arrangements yes   Is patient able to care for self after discharge? Yes   Who are your caregiver(s) and their phone number(s)? Sons : jen howellritt [phone] or hannah howellritt [phone]   Patient's perception of discharge disposition home or selfcare   Readmission Within the Last 30 Days no previous admission in last 30 days   Patient currently being followed by outpatient case management? No   Patient currently receives any other outside agency services? No   Equipment Currently Used at Home walker, standard   Do you have any problems affording any of your prescribed medications? TBD   Is the patient taking medications as prescribed? yes   Does the patient have transportation home? Yes   Transportation Anticipated family or friend will provide   Does the patient receive services at the [facility]? No   DME Needed Upon Discharge  none   Patient/Family in Agreement with Plan yes     Patient resting in bed awake and alert with son at bedside.  Patient lives at home with spouse and has family available for support.  Patient states she has a standard walker  and shower bench that she uses at home.  Denies any use of home health services.  Patient may benefit from medication assistance voucher. Case management will continue to follow for further needs.

## 2020-06-26 NOTE — SUBJECTIVE & OBJECTIVE
"Past Medical History:   Diagnosis Date    Arthritis     Diabetes mellitus, type 2     Encounter for blood transfusion     Hypertension     PONV (postoperative nausea and vomiting)     Wears glasses        Past Surgical History:   Procedure Laterality Date    FOOT MASS EXCISION Left 2/7/2020    Procedure: EXCISION, MASS, FOOT;  Surgeon: Osvaldo Arreola DPM;  Location: [facility] OR;  Service: Podiatry;  Laterality: Left;    HIP ARTHROPLASTY Right 1/22/2019    Procedure: ARTHROPLASTY, HIP;  Surgeon: Jose Nicole Jr., MD;  Location: [facility] OR;  Service: Orthopedics;  Laterality: Right;  Synvisc    HIP SURGERY      HYSTERECTOMY      JOINT REPLACEMENT Left     HIP    PARTIAL HYSTERECTOMY      partical toe amputation      REPAIR OF TENDON OF LOWER EXTREMITY Left 2/7/2020    Procedure: REPAIR, TENDON, LOWER EXTREMITY;  Surgeon: Osvaldo Arreola DPM;  Location: [facility] OR;  Service: Podiatry;  Laterality: Left;  tendon repair lt ft big toe       Review of patient's allergies indicates:   Allergen Reactions    Banana Hives    Penicillins Hives       No current facility-administered medications on file prior to encounter.      Current Outpatient Medications on File Prior to Encounter   Medication Sig    aspirin (ECOTRIN) 81 MG EC tablet Take 81 mg by mouth once daily.    atenolol (TENORMIN) 100 MG tablet atenolol 100 mg tablet    BD ULTRA-FINE LILI PEN NEEDLE 32 gauge x 5/32" Ndle     dapagliflozin-metformin (XIGDUO XR) 5-1,000 mg TBph Xigduo XR 5 mg-1,000 mg tablet,extended release   Take 1 tablet twice a day by oral route for 90 days.    diclofenac (VOLTAREN) 75 MG EC tablet 2 (two) times daily.     fenofibrate (TRICOR) 145 MG tablet fenofibrate nanocrystallized 145 mg tablet    gabapentin (NEURONTIN) 100 MG capsule gabapentin 100 mg capsule    gabapentin (NEURONTIN) 400 MG capsule gabapentin 400 mg capsule    insulin aspart protamine-insulin aspart (NOVOLOG MIX 70-30FLEXPEN U-100) 100 unit/mL (70-30) " InPn pen Novolog Mix 70-30 FlexPen U-100 Insulin 100 unit/mL subcutaneous pen    ketoconazole (NIZORAL) 2 % cream 1 application 2 (two) times daily. Apply to affected area    meclizine (ANTIVERT) 25 mg tablet meclizine 25 mg tablet    omega-3 acid ethyl esters (LOVAZA) 1 gram capsule Take 2 g by mouth once daily.     quinapril-hydrochlorothiazide (ACCURETIC) 20-12.5 mg per tablet quinapril 20 mg-hydrochlorothiazide 12.5 mg tablet    simvastatin (ZOCOR) 20 MG tablet every other day. HS    TRUE METRIX GLUCOSE TEST STRIP Strp     VICTOZA 3-NANCY 0.6 mg/0.1 mL (18 mg/3 mL) PnIj 2 (two) times daily. 1.8 MG AM AND 1.2 MG HS     Family History     None        Tobacco Use    Smoking status: Former Smoker     Quit date: 1979     Years since quittin.4    Smokeless tobacco: Never Used   Substance and Sexual Activity    Alcohol use: No     Frequency: Never    Drug use: No    Sexual activity: Not on file     Review of Systems   Constitutional: Positive for fatigue. Negative for activity change, appetite change and fever.   HENT: Negative for congestion, ear discharge, mouth sores, nosebleeds, rhinorrhea, sinus pressure, sinus pain and tinnitus.    Eyes: Negative.  Negative for pain, redness and itching.   Respiratory: Negative for apnea, cough, choking, chest tightness, shortness of breath, wheezing and stridor.    Cardiovascular: Negative for chest pain, palpitations and leg swelling.   Gastrointestinal: Negative for abdominal distention, abdominal pain, anal bleeding, blood in stool, constipation and diarrhea.   Endocrine: Negative.    Genitourinary: Negative for difficulty urinating, flank pain, frequency and urgency.   Musculoskeletal: Positive for arthralgias. Negative for back pain, gait problem and myalgias.   Skin: Positive for color change and wound. Negative for pallor.   Allergic/Immunologic: Negative.    Neurological: Negative for dizziness, facial asymmetry, weakness, light-headedness and  headaches.   Hematological: Negative for adenopathy. Does not bruise/bleed easily.   Psychiatric/Behavioral: Negative for confusion. The patient is nervous/anxious.      Objective:     Vital Signs (Most Recent):  Temp: 97.8 °F (36.6 °C) (06/26/20 1640)  Pulse: 80 (06/26/20 1640)  Resp: 18 (06/26/20 1640)  BP: 137/65 (06/26/20 1640)  SpO2: 97 % (06/26/20 1640) Vital Signs (24h Range):  Temp:  [97.8 °F (36.6 °C)-98.2 °F (36.8 °C)] 97.8 °F (36.6 °C)  Pulse:  [72-83] 80  Resp:  [18-20] 18  SpO2:  [91 %-97 %] 97 %  BP: (136-173)/() 137/65     Weight: 118.3 kg (260 lb 12.9 oz)  Body mass index is 44.77 kg/m².    Physical Exam  Vitals signs and nursing note reviewed.   Constitutional:       Appearance: She is well-developed.   HENT:      Head: Normocephalic and atraumatic.   Eyes:      Pupils: Pupils are equal, round, and reactive to light.   Neck:      Musculoskeletal: Normal range of motion and neck supple.      Thyroid: No thyromegaly.      Trachea: No tracheal deviation.   Cardiovascular:      Rate and Rhythm: Normal rate and regular rhythm.      Heart sounds: Normal heart sounds.   Pulmonary:      Effort: Pulmonary effort is normal.      Breath sounds: Normal breath sounds.   Abdominal:      General: Bowel sounds are normal. There is no distension.      Palpations: Abdomen is soft.      Tenderness: There is no abdominal tenderness. There is no guarding or rebound.   Musculoskeletal: Normal range of motion.         General: Swelling and tenderness present.      Left lower leg: Edema present.      Comments: Erythema and swelling of the left lower extremity to the level of the knee.  Also 1 x 1 cm ulceration on the plantar surface a of the hallux on the left foot.   Lymphadenopathy:      Cervical: No cervical adenopathy.   Skin:     General: Skin is warm and dry.      Capillary Refill: Capillary refill takes less than 2 seconds.   Neurological:      General: No focal deficit present.      Mental Status: She is  alert and oriented to person, place, and time.   Psychiatric:         Behavior: Behavior normal.         Thought Content: Thought content normal.         Judgment: Judgment normal.           CRANIAL NERVES     CN III, IV, VI   Pupils are equal, round, and reactive to light.       Significant Labs:   Recent Lab Results       06/26/20  1710   06/26/20  1549   06/26/20  1204   06/26/20  1139        Albumin     3.7       Alkaline Phosphatase     49       ALT     22       Anion Gap     14       AST     25       Baso #     0.03       Basophil%     0.3       BILIRUBIN TOTAL     0.4  Comment:  For infants and newborns, interpretation of results should be based  on gestational age, weight and in agreement with clinical  observations.  Premature Infant recommended reference ranges:  Up to 24 hours.............<8.0 mg/dL  Up to 48 hours............<12.0 mg/dL  3-5 days..................<15.0 mg/dL  6-29 days.................<15.0 mg/dL         BUN, Bld     27       Calcium     9.4       Chloride     101       CO2     23       Creatinine     0.8       D-Dimer     361  Comment:  The quantitative D-dimer assay should be used as an aid in the   diagnosis of   deep vein thrombosis and pulmonary embolism. The result of the test   should   be evaluated in the context of all the clinical and laboratory data   available. In those instances where the laboratory result does not   agree   with the clinical evaluation, additional tests should be performed   accordingly.         Differential Method     Automated       eGFR if      >60.0       eGFR if non      >60.0  Comment:  Calculation used to obtain the estimated glomerular filtration  rate (eGFR) is the CKD-EPI equation.          Eos #     0.0       Eosinophil%     0.5       Glucose     118       Gran # (ANC)     6.6       Gran%     75.5       Hematocrit     41.4       Hemoglobin     13.0       Immature Grans (Abs)     0.07  Comment:  Mild elevation in  immature granulocytes is non specific and   can be seen in a variety of conditions including stress response,   acute inflammation, trauma and pregnancy. Correlation with other   laboratory and clinical findings is essential.         Immature Granulocytes     0.8       Lactate, Kem     1.1  Comment:  Falsely low lactic acid results can be found in samples   containing >=13.0 mg/dL total bilirubin and/or >=3.5 mg/dL   direct bilirubin.         Lymph #     1.2       Lymph%     14.0       MCH     26.6       MCHC     31.4       MCV     85       Mono #     0.8       Mono%     8.9       MPV     10.0       nRBC     0       Platelets     273       POCT Glucose 124     108     Potassium     3.9       PROTEIN TOTAL     7.9       RBC     4.88       RDW     15.7       SARS-CoV-2 RNA, Amplification, Qual   Negative  Comment:  This test utilizes isothermal nucleic acid amplification   technology to detect the SARS-CoV-2 RdRp nucleic acid segment.   The analytical sensitivity (limit of detection) is 125 genome   equivalents/mL.   A POSITIVE result implies infection with the SARS-CoV-2 virus;  the patient is presumed to be contagious.    A NEGATIVE result means that SARS-CoV-2 nucleic acids are not  present above the limit of detection. A NEGATIVE result should be   treated as presumptive. It does not rule out the possibility of   COVID-19 and should not be the sole basis for treatment decisions.   If COVID-19 is strongly suspected based on clinical and exposure   history, re-testing using an alternate molecular assay should be   considered.   This test is only for use under the Food and Drug   Administration s Emergency Use Authorization (EUA).   Commercial kits are provided by [facility].   Performance characteristics of the EUA have been independently  verified by [facility] Department of  Pathology and Laboratory Medicine.   _________________________________________________________________  The [employer] NOW  COVID-19 Letter of Authorization, along with the   authorized Fact Sheet for Healthcare Providers, the authorized Fact  Sheet for Patients, and authorized labeling are available on the FDA   website:  www.fda.gov/MedicalDevices/Safety/EmergencySituations/ipx236024.htm           Sodium     138       WBC     8.72           All pertinent labs within the past 24 hours have been reviewed.    Significant Imaging: I have reviewed and interpreted all pertinent imaging results/findings within the past 24 hours.

## 2020-06-26 NOTE — NURSING
Patient transported from ER to room 115 via wheelchair, respirations even and unlabored, No distress noted. Vital signs stable. Patient oriented to call light and verbalizes understanding. Call light within reach, bed in lowest position, wheels locked, bed alarm on and audible.

## 2020-06-27 LAB
ALBUMIN SERPL BCP-MCNC: 3 G/DL (ref 3.5–5.2)
ALP SERPL-CCNC: 46 U/L (ref 55–135)
ALT SERPL W/O P-5'-P-CCNC: 19 U/L (ref 10–44)
ANION GAP SERPL CALC-SCNC: 11 MMOL/L (ref 8–16)
AST SERPL-CCNC: 26 U/L (ref 10–40)
BACTERIA #/AREA URNS HPF: ABNORMAL /HPF
BASOPHILS # BLD AUTO: 0.04 K/UL (ref 0–0.2)
BASOPHILS NFR BLD: 0.6 % (ref 0–1.9)
BILIRUB SERPL-MCNC: 0.6 MG/DL (ref 0.1–1)
BILIRUB UR QL STRIP: NEGATIVE
BUN SERPL-MCNC: 32 MG/DL (ref 8–23)
CALCIUM SERPL-MCNC: 8.4 MG/DL (ref 8.7–10.5)
CHLORIDE SERPL-SCNC: 104 MMOL/L (ref 95–110)
CLARITY UR: CLEAR
CO2 SERPL-SCNC: 23 MMOL/L (ref 23–29)
COLOR UR: YELLOW
CREAT SERPL-MCNC: 0.8 MG/DL (ref 0.5–1.4)
DIFFERENTIAL METHOD: ABNORMAL
EOSINOPHIL # BLD AUTO: 0.1 K/UL (ref 0–0.5)
EOSINOPHIL NFR BLD: 1.3 % (ref 0–8)
ERYTHROCYTE [DISTWIDTH] IN BLOOD BY AUTOMATED COUNT: 15.7 % (ref 11.5–14.5)
EST. GFR  (AFRICAN AMERICAN): >60 ML/MIN/1.73 M^2
EST. GFR  (NON AFRICAN AMERICAN): >60 ML/MIN/1.73 M^2
GLUCOSE SERPL-MCNC: 124 MG/DL (ref 70–110)
GLUCOSE UR QL STRIP: ABNORMAL
HCT VFR BLD AUTO: 37.8 % (ref 37–48.5)
HGB BLD-MCNC: 11.6 G/DL (ref 12–16)
HGB UR QL STRIP: NEGATIVE
IMM GRANULOCYTES # BLD AUTO: 0.09 K/UL (ref 0–0.04)
IMM GRANULOCYTES NFR BLD AUTO: 1.3 % (ref 0–0.5)
KETONES UR QL STRIP: ABNORMAL
LEUKOCYTE ESTERASE UR QL STRIP: ABNORMAL
LYMPHOCYTES # BLD AUTO: 1.3 K/UL (ref 1–4.8)
LYMPHOCYTES NFR BLD: 19.1 % (ref 18–48)
MAGNESIUM SERPL-MCNC: 1.7 MG/DL (ref 1.6–2.6)
MCH RBC QN AUTO: 26.2 PG (ref 27–31)
MCHC RBC AUTO-ENTMCNC: 30.7 G/DL (ref 32–36)
MCV RBC AUTO: 86 FL (ref 82–98)
MICROSCOPIC COMMENT: ABNORMAL
MONOCYTES # BLD AUTO: 0.7 K/UL (ref 0.3–1)
MONOCYTES NFR BLD: 9.7 % (ref 4–15)
NEUTROPHILS # BLD AUTO: 4.6 K/UL (ref 1.8–7.7)
NEUTROPHILS NFR BLD: 68 % (ref 38–73)
NITRITE UR QL STRIP: NEGATIVE
NON-SQ EPI CELLS #/AREA URNS HPF: 3 /HPF
NRBC BLD-RTO: 0 /100 WBC
PH UR STRIP: 5 [PH] (ref 5–8)
PHOSPHATE SERPL-MCNC: 4.6 MG/DL (ref 2.7–4.5)
PLATELET # BLD AUTO: 240 K/UL (ref 150–350)
PMV BLD AUTO: 9.6 FL (ref 9.2–12.9)
POCT GLUCOSE: 120 MG/DL (ref 70–110)
POCT GLUCOSE: 132 MG/DL (ref 70–110)
POCT GLUCOSE: 142 MG/DL (ref 70–110)
POCT GLUCOSE: 170 MG/DL (ref 70–110)
POTASSIUM SERPL-SCNC: 3.6 MMOL/L (ref 3.5–5.1)
PROCALCITONIN SERPL IA-MCNC: 0.05 NG/ML
PROT SERPL-MCNC: 6.7 G/DL (ref 6–8.4)
PROT UR QL STRIP: NEGATIVE
RBC # BLD AUTO: 4.42 M/UL (ref 4–5.4)
RBC #/AREA URNS HPF: 3 /HPF (ref 0–4)
SODIUM SERPL-SCNC: 138 MMOL/L (ref 136–145)
SP GR UR STRIP: 1.02 (ref 1–1.03)
SQUAMOUS #/AREA URNS HPF: 10 /HPF
URN SPEC COLLECT METH UR: ABNORMAL
UROBILINOGEN UR STRIP-ACNC: NEGATIVE EU/DL
WBC # BLD AUTO: 6.82 K/UL (ref 3.9–12.7)
WBC #/AREA URNS HPF: 15 /HPF (ref 0–5)
YEAST URNS QL MICRO: ABNORMAL

## 2020-06-27 PROCEDURE — 99223 PR INITIAL HOSPITAL CARE,LEVL III: ICD-10-PCS | Mod: ,,, | Performed by: PODIATRIST

## 2020-06-27 PROCEDURE — 96374 THER/PROPH/DIAG INJ IV PUSH: CPT | Mod: 59 | Performed by: INTERNAL MEDICINE

## 2020-06-27 PROCEDURE — 83735 ASSAY OF MAGNESIUM: CPT

## 2020-06-27 PROCEDURE — 36415 COLL VENOUS BLD VENIPUNCTURE: CPT

## 2020-06-27 PROCEDURE — 81000 URINALYSIS NONAUTO W/SCOPE: CPT

## 2020-06-27 PROCEDURE — 99226 PR SUBSEQUENT OBSERVATION CARE,LEVEL III: CPT | Mod: ,,, | Performed by: INTERNAL MEDICINE

## 2020-06-27 PROCEDURE — 84145 PROCALCITONIN (PCT): CPT

## 2020-06-27 PROCEDURE — 63600175 PHARM REV CODE 636 W HCPCS: Performed by: INTERNAL MEDICINE

## 2020-06-27 PROCEDURE — 80053 COMPREHEN METABOLIC PANEL: CPT

## 2020-06-27 PROCEDURE — 99226 PR SUBSEQUENT OBSERVATION CARE,LEVEL III: ICD-10-PCS | Mod: ,,, | Performed by: INTERNAL MEDICINE

## 2020-06-27 PROCEDURE — 99223 1ST HOSP IP/OBS HIGH 75: CPT | Mod: ,,, | Performed by: PODIATRIST

## 2020-06-27 PROCEDURE — 85025 COMPLETE CBC W/AUTO DIFF WBC: CPT

## 2020-06-27 PROCEDURE — 96376 TX/PRO/DX INJ SAME DRUG ADON: CPT | Performed by: INTERNAL MEDICINE

## 2020-06-27 PROCEDURE — 25000003 PHARM REV CODE 250: Performed by: INTERNAL MEDICINE

## 2020-06-27 PROCEDURE — 25000003 PHARM REV CODE 250: Performed by: HOSPITALIST

## 2020-06-27 PROCEDURE — G0378 HOSPITAL OBSERVATION PER HR: HCPCS

## 2020-06-27 PROCEDURE — 84100 ASSAY OF PHOSPHORUS: CPT

## 2020-06-27 PROCEDURE — 96372 THER/PROPH/DIAG INJ SC/IM: CPT | Mod: 59 | Performed by: INTERNAL MEDICINE

## 2020-06-27 PROCEDURE — 87086 URINE CULTURE/COLONY COUNT: CPT

## 2020-06-27 PROCEDURE — C9113 INJ PANTOPRAZOLE SODIUM, VIA: HCPCS | Performed by: INTERNAL MEDICINE

## 2020-06-27 PROCEDURE — 63600175 PHARM REV CODE 636 W HCPCS: Performed by: HOSPITALIST

## 2020-06-27 PROCEDURE — 96375 TX/PRO/DX INJ NEW DRUG ADDON: CPT | Mod: 59 | Performed by: INTERNAL MEDICINE

## 2020-06-27 RX ORDER — DIPHENHYDRAMINE HCL 25 MG
25 CAPSULE ORAL EVERY 6 HOURS PRN
Status: DISCONTINUED | OUTPATIENT
Start: 2020-06-27 | End: 2020-06-28 | Stop reason: HOSPADM

## 2020-06-27 RX ORDER — LISINOPRIL 2.5 MG/1
5 TABLET ORAL NIGHTLY
Status: DISCONTINUED | OUTPATIENT
Start: 2020-06-28 | End: 2020-06-28 | Stop reason: HOSPADM

## 2020-06-27 RX ORDER — DIPHENHYDRAMINE HCL 25 MG
CAPSULE ORAL
Status: DISPENSED
Start: 2020-06-27 | End: 2020-06-28

## 2020-06-27 RX ADMIN — ATENOLOL 100 MG: 25 TABLET ORAL at 09:06

## 2020-06-27 RX ADMIN — DIPHENHYDRAMINE HYDROCHLORIDE 25 MG: 25 CAPSULE ORAL at 09:06

## 2020-06-27 RX ADMIN — INSULIN ASPART 60 UNITS: 100 INJECTION, SUSPENSION SUBCUTANEOUS at 07:06

## 2020-06-27 RX ADMIN — ENOXAPARIN SODIUM 40 MG: 40 INJECTION SUBCUTANEOUS at 04:06

## 2020-06-27 RX ADMIN — VANCOMYCIN HYDROCHLORIDE 1000 MG: 1 INJECTION, POWDER, LYOPHILIZED, FOR SOLUTION INTRAVENOUS at 04:06

## 2020-06-27 RX ADMIN — GABAPENTIN 400 MG: 100 CAPSULE ORAL at 09:06

## 2020-06-27 RX ADMIN — INSULIN ASPART 70 UNITS: 100 INJECTION, SUSPENSION SUBCUTANEOUS at 04:06

## 2020-06-27 RX ADMIN — FENOFIBRATE 144 MG: 48 TABLET, FILM COATED ORAL at 08:06

## 2020-06-27 RX ADMIN — CIPROFLOXACIN 400 MG: 2 INJECTION, SOLUTION INTRAVENOUS at 06:06

## 2020-06-27 RX ADMIN — OMEGA-3-ACID ETHYL ESTERS 2 G: 1 CAPSULE, LIQUID FILLED ORAL at 08:06

## 2020-06-27 RX ADMIN — PANTOPRAZOLE SODIUM 40 MG: 40 INJECTION, POWDER, LYOPHILIZED, FOR SOLUTION INTRAVENOUS at 08:06

## 2020-06-27 RX ADMIN — ASPIRIN 81 MG: 81 TABLET, COATED ORAL at 08:06

## 2020-06-27 RX ADMIN — GABAPENTIN 100 MG: 100 CAPSULE ORAL at 08:06

## 2020-06-27 NOTE — ASSESSMENT & PLAN NOTE
06/26/2020:  Admit to medical-surgical unit.  Begin on ciprofloxacin 400 mg IV twice daily  Begin on vancomycin dosed by pharmacy  Follow-up CT scan of the left lower extremity to evaluate for bony erosion  Ultrasound of the left lower extremity to evaluate for thrombosis.  Continue other symptomatic medications as needed.  Repeat labs in the a.m.  DVT prophylaxis.    06/27/2020:  Continue current medications  Follow-up results of Doppler ultrasound the lower extremity on the left  Repeat labs in the a.m. CBC and BMP  Possible discharge tomorrow if stable and improved

## 2020-06-27 NOTE — PROGRESS NOTES
[facility] Medicine  Progress Note    Patient Name: Marifer Bernardo  MRN: [id number]  Patient Class: OP- Observation   Admission Date: 6/26/2020  Length of Stay: 0 days  Attending Physician: Randolph Ho MD  Primary Care Provider: Jose Mary MD        Subjective:     Principal Problem:Cellulitis of left lower extremity        HPI:  Patient is a 70-year-old female that presented to the emergency department complaining of a 2 day history left leg swelling and redness.  Patient stated that she noticed this 2 days ago and yesterday became worse.  She denies any fever chills nausea or vomiting but states that she felt that it looked worse and felt that she needed to have it evaluated.  Patient also has a 1 cm x 1 cm ulcer on the plantar surface of her left hallux.  This has been there for some time she states has never healed from a previous surgery.  She has seen Dr. Arreola in the past for this and states that she sees him every 2-3 weeks.  Patient has a past medical history of type 2 diabetes mellitus, hypertension, and osteoarthritis.    Overview/Hospital Course:  Emergency department evaluation:  COVID swab negative.  Chem profile glucose 118 BUN 27 creatinine 0.8 alkaline phosphatase 49 GFR greater than 60 all other values negativeD-dimer of 361  White blood cell count normal at 8.7 hemoglobin 13 hematocrit 41 platelets 273 and differential showing 75% granulocytes 14 facet lymphocytes  Blood cultures are pending    06/27/2020:  Patient has no new complaints this morning.  States she feels that her left leg is less swollen.  Patient was getting ultrasound of the left lower extremity at the time of my visit.  Labs are unremarkable with procalcitonin normal at 0.05  Urinalysis showing trace white blood cells with 15 white blood cells.  Leukocyte trace  White blood cell count normal at 8.7 with normal hemoglobin hematocrit and platelets 273 with normal  differential  Comprehensive metabolic panel normal except for glucose 124 BUN 32 creatinine 0.8 and total bilirubin normal with alkaline phosphatase 46  Vital signs have been stable and patient is improving with therapy.  Continue antibiotics and possible discharge tomorrow.  Consult with Podiatry is pending due to diabetic foot ulcer on the plantar surface of the hallux    Interval History:     Review of Systems   Constitutional: Positive for fatigue. Negative for activity change, appetite change and fever.   HENT: Negative for congestion, ear discharge, mouth sores, nosebleeds, rhinorrhea, sinus pressure, sinus pain and tinnitus.    Eyes: Negative.  Negative for pain, redness and itching.   Respiratory: Negative for apnea, cough, choking, chest tightness, shortness of breath, wheezing and stridor.    Cardiovascular: Negative for chest pain, palpitations and leg swelling.   Gastrointestinal: Negative for abdominal distention, abdominal pain, anal bleeding, blood in stool, constipation and diarrhea.   Endocrine: Negative.    Genitourinary: Negative for difficulty urinating, flank pain, frequency and urgency.   Musculoskeletal: Positive for arthralgias, gait problem and myalgias. Negative for back pain.   Skin: Positive for color change and wound. Negative for pallor.        Left lower extremity erythema with unilateral swelling  Monitor ultrasound of the left lower extremity for thrombosis   Allergic/Immunologic: Negative.    Neurological: Negative for dizziness, facial asymmetry, weakness, light-headedness and headaches.   Hematological: Negative for adenopathy. Does not bruise/bleed easily.   Psychiatric/Behavioral: The patient is nervous/anxious.      Objective:     Vital Signs (Most Recent):  Temp: 97.1 °F (36.2 °C) (06/27/20 0733)  Pulse: 70 (06/27/20 0733)  Resp: 16 (06/27/20 0733)  BP: 131/63 (06/27/20 0733)  SpO2: 95 % (06/27/20 0733) Vital Signs (24h Range):  Temp:  [96.2 °F (35.7 °C)-98.3 °F (36.8 °C)]  97.1 °F (36.2 °C)  Pulse:  [64-80] 70  Resp:  [16-20] 16  SpO2:  [91 %-97 %] 95 %  BP: (109-173)/() 131/63     Weight: 118.3 kg (260 lb 12.9 oz)  Body mass index is 44.77 kg/m².    Intake/Output Summary (Last 24 hours) at 6/27/2020 1206  Last data filed at 6/27/2020 0800  Gross per 24 hour   Intake 490 ml   Output 80 ml   Net 410 ml      Physical Exam  Vitals signs and nursing note reviewed.   Constitutional:       Appearance: She is well-developed.   HENT:      Head: Normocephalic and atraumatic.   Eyes:      Pupils: Pupils are equal, round, and reactive to light.   Neck:      Musculoskeletal: Normal range of motion and neck supple.      Thyroid: No thyromegaly.      Trachea: No tracheal deviation.   Cardiovascular:      Rate and Rhythm: Normal rate and regular rhythm.      Heart sounds: Normal heart sounds.   Pulmonary:      Effort: Pulmonary effort is normal.      Breath sounds: Normal breath sounds.   Abdominal:      General: Bowel sounds are normal. There is no distension.      Palpations: Abdomen is soft.      Tenderness: There is no abdominal tenderness. There is no guarding or rebound.   Musculoskeletal: Normal range of motion.         General: Swelling and tenderness present.      Left lower leg: Edema present.   Lymphadenopathy:      Cervical: No cervical adenopathy.   Skin:     General: Skin is warm and dry.      Capillary Refill: Capillary refill takes less than 2 seconds.   Neurological:      Mental Status: She is alert and oriented to person, place, and time.   Psychiatric:         Behavior: Behavior normal.         Thought Content: Thought content normal.         Judgment: Judgment normal.         Significant Labs:   Recent Lab Results       06/27/20  1144   06/27/20  0758   06/27/20  0719   06/27/20  0612   06/26/20  2100        Procalcitonin       0.05  Comment:  A concentration < 0.25 ng/mL represents a low risk bacterial   infection.  Procalcitonin may not be accurate among patients with  localized   infection, recent trauma or major surgery, immunosuppressed state,   invasive fungal infection, renal dysfunction. Decisions regarding   initiation or continuation of antibiotic therapy should not be based   solely on procalcitonin levels.         Albumin       3.0       Alkaline Phosphatase       46       ALT       19       Anion Gap       11       Appearance, UA   Clear           AST       26       Bacteria, UA   Few           Baso #       0.04       Basophil%       0.6       Bilirubin (UA)   Negative           BILIRUBIN TOTAL       0.6  Comment:  For infants and newborns, interpretation of results should be based  on gestational age, weight and in agreement with clinical  observations.  Premature Infant recommended reference ranges:  Up to 24 hours.............<8.0 mg/dL  Up to 48 hours............<12.0 mg/dL  3-5 days..................<15.0 mg/dL  6-29 days.................<15.0 mg/dL         Blood Culture, Routine               BUN, Bld       32       Calcium       8.4       Chloride       104       CO2       23       Color, UA   Yellow           Creatinine       0.8       Differential Method       Automated       eGFR if        >60.0       eGFR if non        >60.0  Comment:  Calculation used to obtain the estimated glomerular filtration  rate (eGFR) is the CKD-EPI equation.          Eos #       0.1       Eosinophil%       1.3       Estimated Avg Glucose               Glucose       124       Glucose, UA   1+           Gran # (ANC)       4.6       Gran%       68.0       Hematocrit       37.8       Hemoglobin       11.6       Hemoglobin A1C External               Immature Grans (Abs)       0.09  Comment:  Mild elevation in immature granulocytes is non specific and   can be seen in a variety of conditions including stress response,   acute inflammation, trauma and pregnancy. Correlation with other   laboratory and clinical findings is essential.         Immature  Granulocytes       1.3       Ketones, UA   Trace           Leukocytes, UA   Trace           Lymph #       1.3       Lymph%       19.1       Magnesium       1.7       MCH       26.2       MCHC       30.7       MCV       86       Microscopic Comment   SEE COMMENT  Comment:  Other formed elements not mentioned in the report are not   present in the microscopic examination.              Mono #       0.7       Mono%       9.7       MPV       9.6       NITRITE UA   Negative           Non-Squam Epith   3           nRBC       0       Occult Blood UA   Negative           pH, UA   5.0           Phosphorus       4.6       Platelets       240       POCT Glucose 132   142   174     Potassium       3.6       PROTEIN TOTAL       6.7       Protein, UA   Negative  Comment:  Recommend a 24 hour urine protein or a urine   protein/creatinine ratio if globulin induced proteinuria is  clinically suspected.             RBC       4.42       RBC, UA   3           RDW       15.7       SARS-CoV-2 RNA, Amplification, Qual               Sodium       138       Specific [place], UA   1.025           Specimen UA   Urine, Clean Catch           Squam Epithel, UA   10           UROBILINOGEN UA   Negative           WBC, UA   15           WBC       6.82       Yeast, UA   Few                            06/26/20  1710   06/26/20  1704   06/26/20  1549   06/26/20  1232        Procalcitonin             Albumin             Alkaline Phosphatase             ALT             Anion Gap             Appearance, UA             AST             Bacteria, UA             Baso #             Basophil%             Bilirubin (UA)             BILIRUBIN TOTAL             Blood Culture, Routine       No Growth to date[P]     BUN, Bld             Calcium             Chloride             CO2             Color, UA             Creatinine             Differential Method             eGFR if              eGFR if non              Eos #              Eosinophil%             Estimated Avg Glucose   151         Glucose             Glucose, UA             Gran # (ANC)             Gran%             Hematocrit             Hemoglobin             Hemoglobin A1C External   6.9  Comment:  According to ADA guidelines, hemoglobin A1C <7.0% represents  optimal control in non-pregnant diabetic patients.  Different  metrics may apply to specific populations.   Standards of Medical Care in Diabetes - 2016.  For the purpose of screening for the presence of diabetes:  <5.7%     Consistent with the absence of diabetes  5.7-6.4%  Consistent with increasing risk for diabetes   (prediabetes)  >or=6.5%  Consistent with diabetes  Currently no consensus exists for use of hemoglobin A1C  for diagnosis of diabetes for children.           Immature Grans (Abs)             Immature Granulocytes             Ketones, UA             Leukocytes, UA             Lymph #             Lymph%             Magnesium             MCH             MCHC             MCV             Microscopic Comment             Mono #             Mono%             MPV             NITRITE UA             Non-Squam Epith             nRBC             Occult Blood UA             pH, UA             Phosphorus             Platelets             POCT Glucose 124           Potassium             PROTEIN TOTAL             Protein, UA             RBC             RBC, UA             RDW             SARS-CoV-2 RNA, Amplification, Qual     Negative  Comment:  This test utilizes isothermal nucleic acid amplification   technology to detect the SARS-CoV-2 RdRp nucleic acid segment.   The analytical sensitivity (limit of detection) is 125 genome   equivalents/mL.   A POSITIVE result implies infection with the SARS-CoV-2 virus;  the patient is presumed to be contagious.    A NEGATIVE result means that SARS-CoV-2 nucleic acids are not  present above the limit of detection. A NEGATIVE result should be   treated as presumptive. It does not rule  out the possibility of   COVID-19 and should not be the sole basis for treatment decisions.   If COVID-19 is strongly suspected based on clinical and exposure   history, re-testing using an alternate molecular assay should be   considered.   This test is only for use under the Food and Drug   Administration s Emergency Use Authorization (EUA).   Commercial kits are provided by [employer].   Performance characteristics of the EUA have been independently  verified by [facility] Department of  Pathology and Laboratory Medicine.   _________________________________________________________________  The ID NOW COVID-19 Letter of Authorization, along with the   authorized Fact Sheet for Healthcare Providers, the authorized Fact  Sheet for Patients, and authorized labeling are available on the FDA   website:  www.fda.gov/MedicalDevices/Safety/EmergencySituations/alc046747.htm         Sodium             Specific Gravity, UA             Specimen UA             Squam Epithel, UA             UROBILINOGEN UA             WBC, UA             WBC             Yeast, UA                 All pertinent labs within the past 24 hours have been reviewed.    Significant Imaging: I have reviewed and interpreted all pertinent imaging results/findings within the past 24 hours.      Assessment/Plan:      * Cellulitis of left lower extremity  06/26/2020:  Admit to medical-surgical unit.  Begin on ciprofloxacin 400 mg IV twice daily  Begin on vancomycin dosed by pharmacy  Follow-up CT scan of the left lower extremity to evaluate for bony erosion  Ultrasound of the left lower extremity to evaluate for thrombosis.  Continue other symptomatic medications as needed.  Repeat labs in the a.m.  DVT prophylaxis.    06/27/2020:  Continue current medications  Follow-up results of Doppler ultrasound the lower extremity on the left  Repeat labs in the a.m. CBC and BMP  Possible discharge tomorrow if stable and  improved    Hypertension  06/26/2020:  Continue home medications for control of blood pressure.      Diabetes mellitus, type 2  Continue basal insulin  Continue sliding scale insulin for glucose control  Check hemoglobin A1c        VTE Risk Mitigation (From admission, onward)         Ordered     enoxaparin injection 40 mg  Every 24 hours      06/26/20 1653     IP VTE LOW RISK PATIENT  Once      06/26/20 1653     Place sequential compression device  Until discontinued      06/26/20 1653                      Randolph Ho MD  Department of Hospital Medicine   [facility]

## 2020-06-27 NOTE — PLAN OF CARE
Problem: Fall Injury Risk  Goal: Absence of Fall and Fall-Related Injury  Outcome: Ongoing, Progressing     Problem: Adult Inpatient Plan of Care  Goal: Plan of Care Review  Outcome: Ongoing, Progressing     Problem: Adult Inpatient Plan of Care  Goal: Patient-Specific Goal (Individualization)  Outcome: Ongoing, Progressing     Problem: Adult Inpatient Plan of Care  Goal: Absence of Hospital-Acquired Illness or Injury  Outcome: Ongoing, Progressing     Problem: Adult Inpatient Plan of Care  Goal: Optimal Comfort and Wellbeing  Outcome: Ongoing, Progressing     Problem: Adult Inpatient Plan of Care  Goal: Readiness for Transition of Care  Outcome: Ongoing, Progressing     Problem: Adult Inpatient Plan of Care  Goal: Rounds/Family Conference  Outcome: Ongoing, Progressing     Problem: Bariatric Environmental Safety  Goal: Safety Maintained with Care  Outcome: Ongoing, Progressing     Problem: Diabetes Comorbidity  Goal: Blood Glucose Level Within Desired Range  Outcome: Ongoing, Progressing

## 2020-06-27 NOTE — SUBJECTIVE & OBJECTIVE
Interval History:     Review of Systems   Constitutional: Positive for fatigue. Negative for activity change, appetite change and fever.   HENT: Negative for congestion, ear discharge, mouth sores, nosebleeds, rhinorrhea, sinus pressure, sinus pain and tinnitus.    Eyes: Negative.  Negative for pain, redness and itching.   Respiratory: Negative for apnea, cough, choking, chest tightness, shortness of breath, wheezing and stridor.    Cardiovascular: Negative for chest pain, palpitations and leg swelling.   Gastrointestinal: Negative for abdominal distention, abdominal pain, anal bleeding, blood in stool, constipation and diarrhea.   Endocrine: Negative.    Genitourinary: Negative for difficulty urinating, flank pain, frequency and urgency.   Musculoskeletal: Positive for arthralgias, gait problem and myalgias. Negative for back pain.   Skin: Positive for color change and wound. Negative for pallor.        Left lower extremity erythema with unilateral swelling  Monitor ultrasound of the left lower extremity for thrombosis   Allergic/Immunologic: Negative.    Neurological: Negative for dizziness, facial asymmetry, weakness, light-headedness and headaches.   Hematological: Negative for adenopathy. Does not bruise/bleed easily.   Psychiatric/Behavioral: The patient is nervous/anxious.      Objective:     Vital Signs (Most Recent):  Temp: 97.1 °F (36.2 °C) (06/27/20 0733)  Pulse: 70 (06/27/20 0733)  Resp: 16 (06/27/20 0733)  BP: 131/63 (06/27/20 0733)  SpO2: 95 % (06/27/20 0733) Vital Signs (24h Range):  Temp:  [96.2 °F (35.7 °C)-98.3 °F (36.8 °C)] 97.1 °F (36.2 °C)  Pulse:  [64-80] 70  Resp:  [16-20] 16  SpO2:  [91 %-97 %] 95 %  BP: (109-173)/() 131/63     Weight: 118.3 kg (260 lb 12.9 oz)  Body mass index is 44.77 kg/m².    Intake/Output Summary (Last 24 hours) at 6/27/2020 1206  Last data filed at 6/27/2020 0800  Gross per 24 hour   Intake 490 ml   Output 80 ml   Net 410 ml      Physical Exam  Vitals signs and  nursing note reviewed.   Constitutional:       Appearance: She is well-developed.   HENT:      Head: Normocephalic and atraumatic.   Eyes:      Pupils: Pupils are equal, round, and reactive to light.   Neck:      Musculoskeletal: Normal range of motion and neck supple.      Thyroid: No thyromegaly.      Trachea: No tracheal deviation.   Cardiovascular:      Rate and Rhythm: Normal rate and regular rhythm.      Heart sounds: Normal heart sounds.   Pulmonary:      Effort: Pulmonary effort is normal.      Breath sounds: Normal breath sounds.   Abdominal:      General: Bowel sounds are normal. There is no distension.      Palpations: Abdomen is soft.      Tenderness: There is no abdominal tenderness. There is no guarding or rebound.   Musculoskeletal: Normal range of motion.         General: Swelling and tenderness present.      Left lower leg: Edema present.   Lymphadenopathy:      Cervical: No cervical adenopathy.   Skin:     General: Skin is warm and dry.      Capillary Refill: Capillary refill takes less than 2 seconds.   Neurological:      Mental Status: She is alert and oriented to person, place, and time.   Psychiatric:         Behavior: Behavior normal.         Thought Content: Thought content normal.         Judgment: Judgment normal.         Significant Labs:   Recent Lab Results       06/27/20  1144   06/27/20  0758   06/27/20  0719   06/27/20  0612   06/26/20  2100        Procalcitonin       0.05  Comment:  A concentration < 0.25 ng/mL represents a low risk bacterial   infection.  Procalcitonin may not be accurate among patients with localized   infection, recent trauma or major surgery, immunosuppressed state,   invasive fungal infection, renal dysfunction. Decisions regarding   initiation or continuation of antibiotic therapy should not be based   solely on procalcitonin levels.         Albumin       3.0       Alkaline Phosphatase       46       ALT       19       Anion Gap       11       Appearance, UA    Clear           AST       26       Bacteria, UA   Few           Baso #       0.04       Basophil%       0.6       Bilirubin (UA)   Negative           BILIRUBIN TOTAL       0.6  Comment:  For infants and newborns, interpretation of results should be based  on gestational age, weight and in agreement with clinical  observations.  Premature Infant recommended reference ranges:  Up to 24 hours.............<8.0 mg/dL  Up to 48 hours............<12.0 mg/dL  3-5 days..................<15.0 mg/dL  6-29 days.................<15.0 mg/dL         Blood Culture, Routine               BUN, Bld       32       Calcium       8.4       Chloride       104       CO2       23       Color, UA   Yellow           Creatinine       0.8       Differential Method       Automated       eGFR if        >60.0       eGFR if non        >60.0  Comment:  Calculation used to obtain the estimated glomerular filtration  rate (eGFR) is the CKD-EPI equation.          Eos #       0.1       Eosinophil%       1.3       Estimated Avg Glucose               Glucose       124       Glucose, UA   1+           Gran # (ANC)       4.6       Gran%       68.0       Hematocrit       37.8       Hemoglobin       11.6       Hemoglobin A1C External               Immature Grans (Abs)       0.09  Comment:  Mild elevation in immature granulocytes is non specific and   can be seen in a variety of conditions including stress response,   acute inflammation, trauma and pregnancy. Correlation with other   laboratory and clinical findings is essential.         Immature Granulocytes       1.3       Ketones, UA   Trace           Leukocytes, UA   Trace           Lymph #       1.3       Lymph%       19.1       Magnesium       1.7       MCH       26.2       MCHC       30.7       MCV       86       Microscopic Comment   SEE COMMENT  Comment:  Other formed elements not mentioned in the report are not   present in the microscopic examination.               Mono #       0.7       Mono%       9.7       MPV       9.6       NITRITE UA   Negative           Non-Squam Epith   3           nRBC       0       Occult Blood UA   Negative           pH, UA   5.0           Phosphorus       4.6       Platelets       240       POCT Glucose 132   142   174     Potassium       3.6       PROTEIN TOTAL       6.7       Protein, UA   Negative  Comment:  Recommend a 24 hour urine protein or a urine   protein/creatinine ratio if globulin induced proteinuria is  clinically suspected.             RBC       4.42       RBC, UA   3           RDW       15.7       SARS-CoV-2 RNA, Amplification, Qual               Sodium       138       Specific [place], UA   1.025           Specimen UA   Urine, Clean Catch           Squam Epithel, UA   10           UROBILINOGEN UA   Negative           WBC, UA   15           WBC       6.82       Yeast, UA   Few                            06/26/20  1710   06/26/20  1704   06/26/20  1549   06/26/20  1232        Procalcitonin             Albumin             Alkaline Phosphatase             ALT             Anion Gap             Appearance, UA             AST             Bacteria, UA             Baso #             Basophil%             Bilirubin (UA)             BILIRUBIN TOTAL             Blood Culture, Routine       No Growth to date[P]     BUN, Bld             Calcium             Chloride             CO2             Color, UA             Creatinine             Differential Method             eGFR if              eGFR if non              Eos #             Eosinophil%             Estimated Avg Glucose   151         Glucose             Glucose, UA             Gran # (ANC)             Gran%             Hematocrit             Hemoglobin             Hemoglobin A1C External   6.9  Comment:  According to ADA guidelines, hemoglobin A1C <7.0% represents  optimal control in non-pregnant diabetic patients.  Different  metrics may apply to specific  populations.   Standards of Medical Care in Diabetes - 2016.  For the purpose of screening for the presence of diabetes:  <5.7%     Consistent with the absence of diabetes  5.7-6.4%  Consistent with increasing risk for diabetes   (prediabetes)  >or=6.5%  Consistent with diabetes  Currently no consensus exists for use of hemoglobin A1C  for diagnosis of diabetes for children.           Immature Grans (Abs)             Immature Granulocytes             Ketones, UA             Leukocytes, UA             Lymph #             Lymph%             Magnesium             MCH             MCHC             MCV             Microscopic Comment             Mono #             Mono%             MPV             NITRITE UA             Non-Squam Epith             nRBC             Occult Blood UA             pH, UA             Phosphorus             Platelets             POCT Glucose 124           Potassium             PROTEIN TOTAL             Protein, UA             RBC             RBC, UA             RDW             SARS-CoV-2 RNA, Amplification, Qual     Negative  Comment:  This test utilizes isothermal nucleic acid amplification   technology to detect the SARS-CoV-2 RdRp nucleic acid segment.   The analytical sensitivity (limit of detection) is 125 genome   equivalents/mL.   A POSITIVE result implies infection with the SARS-CoV-2 virus;  the patient is presumed to be contagious.    A NEGATIVE result means that SARS-CoV-2 nucleic acids are not  present above the limit of detection. A NEGATIVE result should be   treated as presumptive. It does not rule out the possibility of   COVID-19 and should not be the sole basis for treatment decisions.   If COVID-19 is strongly suspected based on clinical and exposure   history, re-testing using an alternate molecular assay should be   considered.   This test is only for use under the Food and Drug   Administration s Emergency Use Authorization (EUA).   Commercial kits are provided by [employer].   Performance characteristics of the EUA have been independently  verified by [facility] Department of  Pathology and Laboratory Medicine.   _________________________________________________________________  The ID NOW COVID-19 Letter of Authorization, along with the   authorized Fact Sheet for Healthcare Providers, the authorized Fact  Sheet for Patients, and authorized labeling are available on the FDA   website:  www.fda.gov/MedicalDevices/Safety/EmergencySituations/fff821210.htm         Sodium             Specific Gravity, UA             Specimen UA             Squam Epithel, UA             UROBILINOGEN UA             WBC, UA             WBC             Yeast, UA                 All pertinent labs within the past 24 hours have been reviewed.    Significant Imaging: I have reviewed and interpreted all pertinent imaging results/findings within the past 24 hours.

## 2020-06-27 NOTE — CONSULTS
[facility]  Podiatry  Consult Note    Patient Name: Marifer Bernardo  MRN: [id number]  Admission Date: 6/26/2020  Hospital Length of Stay: 0 days  Attending Physician: Randolph Ho MD  Primary Care Provider: Jose Mary MD     Consults  Subjective:     History of Present Illness:  Patient presents for hospital consultation due to cellulitis of the left lower extremity patient does have a chronic wound sub left hallux.  Patient states about 2 nights ago she noticed increased redness swelling increased skin temperature of the left leg by yesterday her foot and leg were both significantly swollen she states by today it is 50% better.    Scheduled Meds:   aspirin  81 mg Oral Daily    atenoloL  100 mg Oral QHS    ciprofloxacin  400 mg Intravenous Q12H    enoxaparin  40 mg Subcutaneous Q24H    fenofibrate  144 mg Oral Daily    gabapentin  100 mg Oral BID    gabapentin  400 mg Oral QHS    insulin aspart protamine-insulin aspart  70 Units Subcutaneous with dinner    insulin aspart protamine-insulin aspart  60 Units Subcutaneous with breakfast    [START ON 6/28/2020] lisinopriL  5 mg Oral QHS    NON FORMULARY MEDICATION 75 mg  75 mg Oral BID    omega-3 acid ethyl esters  2 g Oral Daily    pantoprazole  40 mg Intravenous Daily    simvastatin  20 mg Oral Every Mon, Wed, Fri    vancomycin (VANCOCIN) IVPB  1,000 mg Intravenous Q12H     Continuous Infusions:  PRN Meds:acetaminophen, dextrose 50%, dextrose 50%, glucagon (human recombinant), meclizine, morphine, ondansetron, promethazine (PHENERGAN) IVPB, sodium chloride 0.9%, sodium chloride 0.9%, Pharmacy to dose Vancomycin consult **AND** vancomycin - pharmacy to dose    Review of patient's allergies indicates:   Allergen Reactions    Banana Hives    Penicillins Hives        Past Medical History:   Diagnosis Date    Arthritis     Diabetes mellitus, type 2     Encounter for blood transfusion     Hypertension     PONV  (postoperative nausea and vomiting)     Wears glasses      Past Surgical History:   Procedure Laterality Date    FOOT MASS EXCISION Left 2020    Procedure: EXCISION, MASS, FOOT;  Surgeon: Osvaldo Arreola DPM;  Location: [facility] OR;  Service: Podiatry;  Laterality: Left;    HIP ARTHROPLASTY Right 2019    Procedure: ARTHROPLASTY, HIP;  Surgeon: Jose Nicole Jr., MD;  Location: [facility] OR;  Service: Orthopedics;  Laterality: Right;  Synvisc    HIP SURGERY      HYSTERECTOMY      JOINT REPLACEMENT Left     HIP    PARTIAL HYSTERECTOMY      partical toe amputation      REPAIR OF TENDON OF LOWER EXTREMITY Left 2020    Procedure: REPAIR, TENDON, LOWER EXTREMITY;  Surgeon: Osvaldo Arreola DPM;  Location: [facility] OR;  Service: Podiatry;  Laterality: Left;  tendon repair lt ft big toe       Family History     None        Tobacco Use    Smoking status: Former Smoker     Quit date: 1979     Years since quittin.4    Smokeless tobacco: Never Used   Substance and Sexual Activity    Alcohol use: No     Frequency: Never    Drug use: No    Sexual activity: Not on file     Review of Systems  Objective:     Vital Signs (Most Recent):  Temp: 97.1 °F (36.2 °C) (20)  Pulse: 70 (20)  Resp: 16 (20)  BP: 131/63 (20)  SpO2: 95 % (20) Vital Signs (24h Range):  Temp:  [96.2 °F (35.7 °C)-98.3 °F (36.8 °C)] 97.1 °F (36.2 °C)  Pulse:  [64-83] 70  Resp:  [16-20] 16  SpO2:  [91 %-97 %] 95 %  BP: (109-173)/() 131/63     Weight: 118.3 kg (260 lb 12.9 oz)  Body mass index is 44.77 kg/m².    Physical Exam  Vitals signs and nursing note reviewed.   Constitutional:       Appearance: She is well-developed.   Cardiovascular:      Rate and Rhythm: Normal rate and regular rhythm.      Pulses:           Dorsalis pedis pulses are 1+ on the right side and 1+ on the left side.        Posterior tibial pulses are 1+ on the right side and 1+ on the left side.      Heart  sounds: Normal heart sounds.   Pulmonary:      Effort: Pulmonary effort is normal.      Breath sounds: Normal breath sounds.   Musculoskeletal: Normal range of motion.         General: Tenderness and deformity present.      Left foot: Deformity present.        Feet:    Feet:      Right foot:      Protective Sensation: 4 sites tested. 3 sites sensed.      Left foot:      Protective Sensation: 4 sites tested. 3 sites sensed.      Skin integrity: Ulcer, skin breakdown, erythema and callus present.   Skin:     General: Skin is warm.      Capillary Refill: Capillary refill takes more than 3 seconds.      Findings: Erythema present.   Neurological:      Mental Status: She is alert.   Psychiatric:         Behavior: Behavior normal.         Thought Content: Thought content normal.         Judgment: Judgment normal.      Laboratory:  All pertinent labs reviewed within the last 24 hours.    Diagnostic Results:  I have reviewed all pertinent imaging results/findings within the past 24 hours.    Clinical Findings:  Patient is noted to have cellulitis of the left lower extremity from the level of knee to the level of the ankle the ulceration underlying the plantar aspect of the left hallux appears stable it is dry there is no active drainage tissue appears healthy in nature there is hyperkeratotic tissue around the edges this ulceration itself is approximately 2 cm long by 1 cm wide by 3 mm deep.   time.    Assessment/Plan:     Active Diagnoses:    Diagnosis Date Noted POA    PRINCIPAL PROBLEM:  Cellulitis of left lower extremity [L03.116] 06/26/2020 Yes    Diabetes mellitus, type 2 [E11.9] 01/22/2019 Yes    Hypertension [I10] 01/22/2019 Yes      Problems Resolved During this Admission:     Patient presents for hospital consultation due to cellulitis of the left lower extremity patient does have a chronic wound sub left hallux.  Patient states about 2 nights ago she noticed increased redness swelling increased skin  temperature of the left leg by yesterday her foot and leg were both significantly swollen she states by today it is 50% better.  On evaluation the ulceration site is approximately 2 cm long by 1 cm wide by 3 mm deep there is no active drainage noted no obvious signs of infection from this location there is some hyperkeratotic nonviable tissue that was non excisionally debrided from the edges of the wound site the central aspect of the granular tissue was thoroughly cleaned Dakin solution wet-to-dry dressing was applied as well as a well protected dressing to this area I have advised the patient I am not convinced that this is the source of the infection and in the cellulitis of her left lower extremity early evaluation of venous survey does not show signing signs consistent with DVT.  CT has not been read as of this time this will be further evaluated.  Patient did have skin lesion removed from her right arm she was concerned that this may be having something to do with the infection she was on an oral antibiotic for this I have advised her I do not feel that this is likely especially because all the cellulitis is in her left lower extremity.  Patient did not have any other skin breaks signs of wounds or signs of irritation on the left lower extremity right lower extremity is unremarkable.  Patient's skin temperature is elevated left lower extremity.  Patient is currently on Cipro and vancomycin I have recommended continuing this especially because the patient is showing signs of improvement her WBCs are not elevated I will re-evaluate the patient tomorrow to see how she is progressing I do not anticipate her being in the hospital more than 24-48 hours from my standpoint.  Wound care provided today total face-to-face time including discussion evaluation treatment review of lab work imaging studies equaled 60 min.  Patient was in understanding and agreement with treatment plans.  Will follow-up.  Continue antibiotic  therapy with likely transition to oral antibiotic therapy at discharge.  I do have a scheduled patient to up with her in the office next week I have advised her she will likely go home before then and I do want to follow up with her next week.This note was created using [employer]uka! voice recognition software that occasionally misinterpreted phrases or words.      Thank you for your consult. I will follow-up with patient. Please contact us if you have any additional questions.    Osvaldo Arreola DPM  Podiatry  [facility] - [facility] S[facility]

## 2020-06-28 VITALS
OXYGEN SATURATION: 96 % | RESPIRATION RATE: 17 BRPM | BODY MASS INDEX: 44.53 KG/M2 | DIASTOLIC BLOOD PRESSURE: 63 MMHG | HEART RATE: 65 BPM | WEIGHT: 260.81 LBS | HEIGHT: 64 IN | TEMPERATURE: 97 F | SYSTOLIC BLOOD PRESSURE: 134 MMHG

## 2020-06-28 LAB
ANION GAP SERPL CALC-SCNC: 8 MMOL/L (ref 8–16)
BACTERIA UR CULT: NO GROWTH
BASOPHILS # BLD AUTO: 0.04 K/UL (ref 0–0.2)
BASOPHILS NFR BLD: 0.8 % (ref 0–1.9)
BUN SERPL-MCNC: 28 MG/DL (ref 8–23)
CALCIUM SERPL-MCNC: 8.1 MG/DL (ref 8.7–10.5)
CHLORIDE SERPL-SCNC: 107 MMOL/L (ref 95–110)
CO2 SERPL-SCNC: 25 MMOL/L (ref 23–29)
CREAT SERPL-MCNC: 0.8 MG/DL (ref 0.5–1.4)
DIFFERENTIAL METHOD: ABNORMAL
EOSINOPHIL # BLD AUTO: 0.2 K/UL (ref 0–0.5)
EOSINOPHIL NFR BLD: 3.1 % (ref 0–8)
ERYTHROCYTE [DISTWIDTH] IN BLOOD BY AUTOMATED COUNT: 15.3 % (ref 11.5–14.5)
EST. GFR  (AFRICAN AMERICAN): >60 ML/MIN/1.73 M^2
EST. GFR  (NON AFRICAN AMERICAN): >60 ML/MIN/1.73 M^2
GLUCOSE SERPL-MCNC: 188 MG/DL (ref 70–110)
HCT VFR BLD AUTO: 37.3 % (ref 37–48.5)
HGB BLD-MCNC: 11.7 G/DL (ref 12–16)
IMM GRANULOCYTES # BLD AUTO: 0.15 K/UL (ref 0–0.04)
IMM GRANULOCYTES NFR BLD AUTO: 3.1 % (ref 0–0.5)
LYMPHOCYTES # BLD AUTO: 1.4 K/UL (ref 1–4.8)
LYMPHOCYTES NFR BLD: 28.1 % (ref 18–48)
MCH RBC QN AUTO: 26.7 PG (ref 27–31)
MCHC RBC AUTO-ENTMCNC: 31.4 G/DL (ref 32–36)
MCV RBC AUTO: 85 FL (ref 82–98)
MONOCYTES # BLD AUTO: 0.5 K/UL (ref 0.3–1)
MONOCYTES NFR BLD: 10.4 % (ref 4–15)
NEUTROPHILS # BLD AUTO: 2.6 K/UL (ref 1.8–7.7)
NEUTROPHILS NFR BLD: 54.5 % (ref 38–73)
NRBC BLD-RTO: 0 /100 WBC
PLATELET # BLD AUTO: 257 K/UL (ref 150–350)
PMV BLD AUTO: 9.5 FL (ref 9.2–12.9)
POCT GLUCOSE: 147 MG/DL (ref 70–110)
POTASSIUM SERPL-SCNC: 3.9 MMOL/L (ref 3.5–5.1)
RBC # BLD AUTO: 4.38 M/UL (ref 4–5.4)
SODIUM SERPL-SCNC: 140 MMOL/L (ref 136–145)
VANCOMYCIN TROUGH SERPL-MCNC: 11.8 UG/ML (ref 10–22)
WBC # BLD AUTO: 4.81 K/UL (ref 3.9–12.7)

## 2020-06-28 PROCEDURE — G0378 HOSPITAL OBSERVATION PER HR: HCPCS

## 2020-06-28 PROCEDURE — 36415 COLL VENOUS BLD VENIPUNCTURE: CPT

## 2020-06-28 PROCEDURE — 63600175 PHARM REV CODE 636 W HCPCS: Performed by: INTERNAL MEDICINE

## 2020-06-28 PROCEDURE — 85025 COMPLETE CBC W/AUTO DIFF WBC: CPT

## 2020-06-28 PROCEDURE — 80202 ASSAY OF VANCOMYCIN: CPT

## 2020-06-28 PROCEDURE — 99217 PR OBSERVATION CARE DISCHARGE: CPT | Mod: ,,, | Performed by: INTERNAL MEDICINE

## 2020-06-28 PROCEDURE — 96375 TX/PRO/DX INJ NEW DRUG ADDON: CPT | Mod: 59 | Performed by: INTERNAL MEDICINE

## 2020-06-28 PROCEDURE — 99217 PR OBSERVATION CARE DISCHARGE: ICD-10-PCS | Mod: ,,, | Performed by: INTERNAL MEDICINE

## 2020-06-28 PROCEDURE — C9113 INJ PANTOPRAZOLE SODIUM, VIA: HCPCS | Performed by: INTERNAL MEDICINE

## 2020-06-28 PROCEDURE — 96376 TX/PRO/DX INJ SAME DRUG ADON: CPT | Mod: 59 | Performed by: INTERNAL MEDICINE

## 2020-06-28 PROCEDURE — 25000003 PHARM REV CODE 250: Performed by: HOSPITALIST

## 2020-06-28 PROCEDURE — 80048 BASIC METABOLIC PNL TOTAL CA: CPT

## 2020-06-28 PROCEDURE — 25000003 PHARM REV CODE 250: Performed by: INTERNAL MEDICINE

## 2020-06-28 RX ORDER — CLINDAMYCIN HYDROCHLORIDE 150 MG/1
300 CAPSULE ORAL 4 TIMES DAILY
Qty: 80 CAPSULE | Refills: 0 | Status: SHIPPED | OUTPATIENT
Start: 2020-06-28 | End: 2020-06-30 | Stop reason: ALTCHOICE

## 2020-06-28 RX ORDER — CIPROFLOXACIN 500 MG/1
500 TABLET ORAL 2 TIMES DAILY
Qty: 20 TABLET | Refills: 0 | Status: SHIPPED | OUTPATIENT
Start: 2020-06-28 | End: 2020-07-08

## 2020-06-28 RX ADMIN — PANTOPRAZOLE SODIUM 40 MG: 40 INJECTION, POWDER, LYOPHILIZED, FOR SOLUTION INTRAVENOUS at 09:06

## 2020-06-28 RX ADMIN — VANCOMYCIN HYDROCHLORIDE 1000 MG: 1 INJECTION, POWDER, LYOPHILIZED, FOR SOLUTION INTRAVENOUS at 04:06

## 2020-06-28 RX ADMIN — ASPIRIN 81 MG: 81 TABLET, COATED ORAL at 09:06

## 2020-06-28 RX ADMIN — GABAPENTIN 100 MG: 100 CAPSULE ORAL at 09:06

## 2020-06-28 RX ADMIN — FENOFIBRATE 144 MG: 48 TABLET, FILM COATED ORAL at 09:06

## 2020-06-28 RX ADMIN — CIPROFLOXACIN 400 MG: 2 INJECTION, SOLUTION INTRAVENOUS at 06:06

## 2020-06-28 RX ADMIN — OMEGA-3-ACID ETHYL ESTERS 2 G: 1 CAPSULE, LIQUID FILLED ORAL at 09:06

## 2020-06-28 NOTE — NURSING
PT SITTING UP ON SIDE THE BED. PT ITCHING TO NECK. RASH NOTED TO FACE, NECK AND ABD. NOTIFIED DR LUIS ANTONIO DIEHL. NEW ORDERS NOTED.

## 2020-06-28 NOTE — PLAN OF CARE
Problem: Fall Injury Risk  Goal: Absence of Fall and Fall-Related Injury  Outcome: Ongoing, Progressing  Intervention: Identify and Manage Contributors to Fall Injury Risk  Flowsheets (Taken 6/27/2020 2004)  Self-Care Promotion:   independence encouraged   BADL personal objects within reach  Medication Review/Management: medications reviewed  Intervention: Promote Injury-Free Environment  Flowsheets (Taken 6/27/2020 2004)  Safety Promotion/Fall Prevention:   side rails raised x 2   room near unit station  Environmental Safety Modification: assistive device/personal items within reach     Problem: Adult Inpatient Plan of Care  Goal: Plan of Care Review  Outcome: Ongoing, Progressing  Flowsheets (Taken 6/27/2020 2004)  Plan of Care Reviewed With: patient  Goal: Patient-Specific Goal (Individualization)  Outcome: Ongoing, Progressing  Flowsheets (Taken 6/27/2020 2004)  Individualized Care Needs: none  Anxieties, Fears or Concerns: calm  Goal: Absence of Hospital-Acquired Illness or Injury  Outcome: Ongoing, Progressing  Intervention: Identify and Manage Fall Risk  Flowsheets (Taken 6/27/2020 2004)  Safety Promotion/Fall Prevention:   side rails raised x 2   room near unit station  Intervention: Prevent VTE (venous thromboembolism)  Flowsheets (Taken 6/27/2020 2004)  VTE Prevention/Management: ROM (active) performed  Goal: Optimal Comfort and Wellbeing  Outcome: Ongoing, Progressing  Goal: Readiness for Transition of Care  Outcome: Ongoing, Progressing  Goal: Rounds/Family Conference  Outcome: Ongoing, Progressing  Flowsheets (Taken 6/27/2020 2004)  Participants: patient     Problem: Bariatric Environmental Safety  Goal: Safety Maintained with Care  Outcome: Ongoing, Progressing     Problem: Diabetes Comorbidity  Goal: Blood Glucose Level Within Desired Range  Outcome: Ongoing, Progressing  Intervention: Maintain Glycemic Control  Flowsheets (Taken 6/27/2020 2004)  Glycemic Management: blood glucose monitoring      Problem: Skin Injury Risk Increased  Goal: Skin Health and Integrity  Outcome: Ongoing, Progressing  Intervention: Optimize Skin Protection  Flowsheets (Taken 6/27/2020 2004)  Pressure Reduction Techniques: frequent weight shift encouraged  Skin Protection: skin sealant/moisture barrier applied  Head of Bed (HOB): HOB at 45 degrees  Intervention: Promote and Optimize Oral Intake  Flowsheets (Taken 6/27/2020 2004)  Oral Nutrition Promotion: social interaction promoted   Pt resting quietly. No sign distress noted.

## 2020-06-28 NOTE — PLAN OF CARE
Problem: Fall Injury Risk  Goal: Absence of Fall and Fall-Related Injury  Outcome: Met     Problem: Adult Inpatient Plan of Care  Goal: Plan of Care Review  Outcome: Met  Goal: Patient-Specific Goal (Individualization)  Outcome: Met  Goal: Absence of Hospital-Acquired Illness or Injury  Outcome: Met  Goal: Optimal Comfort and Wellbeing  Outcome: Met  Goal: Readiness for Transition of Care  Outcome: Met  Goal: Rounds/Family Conference  Outcome: Met     Problem: Diabetes Comorbidity  Goal: Blood Glucose Level Within Desired Range  Outcome: Ongoing, Progressing     Problem: Skin Injury Risk Increased  Goal: Skin Health and Integrity  Outcome: Met

## 2020-06-28 NOTE — ASSESSMENT & PLAN NOTE
06/26/2020:  Admit to medical-surgical unit.  Begin on ciprofloxacin 400 mg IV twice daily  Begin on vancomycin dosed by pharmacy  Follow-up CT scan of the left lower extremity to evaluate for bony erosion  Ultrasound of the left lower extremity to evaluate for thrombosis.  Continue other symptomatic medications as needed.  Repeat labs in the a.m.  DVT prophylaxis.    06/27/2020:  Continue current medications  Follow-up results of Doppler ultrasound the lower extremity on the left  Repeat labs in the a.m. CBC and BMP  Possible discharge tomorrow if stable and improved    06/28/2020:  Discharge to home  Follow-up with Dr. Arreola this next week which is already scheduled by Dr. Arreola   Continue current home medications.  Continue ciprofloxacin 500 mg p.o. b.i.d.  Continue clindamycin 300 mg q.6 hours for 10 days.  Keep lower extremity elevated clean and dry

## 2020-06-28 NOTE — NURSING
Pt given discharge instructions and verbalizes understanding.  Pt is A&Ox4, no complaints of SOB, chest pain and shows no obvious signs of distress.  Pt wheeled out to personal vehicle w/o incident.

## 2020-06-28 NOTE — DISCHARGE SUMMARY
[facility] Hospital Medicine  Discharge Summary      Patient Name: Marifer Bernardo  MRN: [id number]  Admission Date: 6/26/2020  Hospital Length of Stay: 0 days  Discharge Date and Time:  06/28/2020 9:25 AM  Attending Physician: Randolph Ho MD   Discharging Provider: Randolph Ho MD  Primary Care Provider: Jose Mary MD      HPI:   Patient is a 70-year-old female that presented to the emergency department complaining of a 2 day history left leg swelling and redness.  Patient stated that she noticed this 2 days ago and yesterday became worse.  She denies any fever chills nausea or vomiting but states that she felt that it looked worse and felt that she needed to have it evaluated.  Patient also has a 1 cm x 1 cm ulcer on the plantar surface of her left hallux.  This has been there for some time she states has never healed from a previous surgery.  She has seen Dr. Arreola in the past for this and states that she sees him every 2-3 weeks.  Patient has a past medical history of type 2 diabetes mellitus, hypertension, and osteoarthritis.    * No surgery found *      Hospital Course:   Emergency department evaluation:  COVID swab negative.  Chem profile glucose 118 BUN 27 creatinine 0.8 alkaline phosphatase 49 GFR greater than 60 all other values negativeD-dimer of 361  White blood cell count normal at 8.7 hemoglobin 13 hematocrit 41 platelets 273 and differential showing 75% granulocytes 14 facet lymphocytes  Blood cultures are pending    06/27/2020:  Patient has no new complaints this morning.  States she feels that her left leg is less swollen.  Patient was getting ultrasound of the left lower extremity at the time of my visit.  Labs are unremarkable with procalcitonin normal at 0.05  Urinalysis showing trace white blood cells with 15 white blood cells.  Leukocyte trace  White blood cell count normal at 8.7 with normal hemoglobin hematocrit and platelets 273 with normal  differential  Comprehensive metabolic panel normal except for glucose 124 BUN 32 creatinine 0.8 and total bilirubin normal with alkaline phosphatase 46  Vital signs have been stable and patient is improving with therapy.  Continue antibiotics and possible discharge tomorrow.  Consult with Podiatry is pending due to diabetic foot ulcer on the plantar surface of the hallux    06/28/2020:  Patient is improving with less erythema to the left lower extremity.  Doppler ultrasound of the left lower quadrant was negative for any thrombosis.  CT of the left lower extremity revealed no evidence of osteomyelitis.  Patient has been on ciprofloxacin and vancomycin.  Has responded well.  Patient will be discharged to home today to continue ciprofloxacin 500 mg b.i.d. for 10 days and clindamycin 300 mg q.6 hours for 10 days.  Patient was seen by Dr. Arreola, podiatry, and will follow-up with Dr. Arreola next week in his office.  Otherwise patient will continue all other medications as previously prescribed.     Consults:   Consults (From admission, onward)        Status Ordering Provider     Pharmacy to dose Vancomycin consult  Once     Provider:  (Not yet assigned)    Acknowledged OLIVER HAMILTON     Podiatry  Once     Provider:  Osvaldo Arreola DPM    Acknowledged OLIVER HAMILTON          * Cellulitis of left lower extremity  06/26/2020:  Admit to medical-surgical unit.  Begin on ciprofloxacin 400 mg IV twice daily  Begin on vancomycin dosed by pharmacy  Follow-up CT scan of the left lower extremity to evaluate for bony erosion  Ultrasound of the left lower extremity to evaluate for thrombosis.  Continue other symptomatic medications as needed.  Repeat labs in the a.m.  DVT prophylaxis.    06/27/2020:  Continue current medications  Follow-up results of Doppler ultrasound the lower extremity on the left  Repeat labs in the a.m. CBC and BMP  Possible discharge tomorrow if stable and improved    06/28/2020:  Discharge to  "home  Follow-up with Dr. Arreola this next week which is already scheduled by Dr. Arreola   Continue current home medications.  Continue ciprofloxacin 500 mg p.o. b.i.d.  Continue clindamycin 300 mg q.6 hours for 10 days.  Keep lower extremity elevated clean and dry      Final Active Diagnoses:    Diagnosis Date Noted POA    PRINCIPAL PROBLEM:  Cellulitis of left lower extremity [L03.116] 06/26/2020 Yes    Diabetes mellitus, type 2 [E11.9] 01/22/2019 Yes    Hypertension [I10] 01/22/2019 Yes      Problems Resolved During this Admission:       Discharged Condition: stable    Disposition:     Follow Up:  Follow-up Information     Jose Mary MD In 3 days.    Specialty: Family Medicine  Contact information:  [address] [facility]  [place] [address] [phone]                 Patient Instructions:   No discharge procedures on file.    Significant Diagnostic Studies: Labs: All labs within the past 24 hours have been reviewed    Pending Diagnostic Studies:     None         Medications:  Reconciled Home Medications:      Medication List      START taking these medications    ciprofloxacin HCl 500 MG tablet  Commonly known as: CIPRO  Take 1 tablet (500 mg total) by mouth 2 (two) times daily. for 10 days     clindamycin 150 MG capsule  Commonly known as: CLEOCIN  Take 2 capsules (300 mg total) by mouth 4 (four) times daily. for 7 days        CONTINUE taking these medications    aspirin 81 MG EC tablet  Commonly known as: ECOTRIN  Take 81 mg by mouth once daily.     atenoloL 100 MG tablet  Commonly known as: TENORMIN  atenolol 100 mg tablet     BD ULTRA-FINE LILI PEN NEEDLE 32 gauge x 5/32" Ndle  Generic drug: pen needle, diabetic     diclofenac 75 MG EC tablet  Commonly known as: VOLTAREN  2 (two) times daily.     fenofibrate 145 MG tablet  Commonly known as: TRICOR  fenofibrate nanocrystallized 145 mg tablet     * gabapentin 100 MG capsule  Commonly known as: NEURONTIN  gabapentin 100 mg capsule     * gabapentin 400 MG " capsule  Commonly known as: NEURONTIN  gabapentin 400 mg capsule     ketoconazole 2 % cream  Commonly known as: NIZORAL  1 application 2 (two) times daily. Apply to affected area     LOVAZA 1 gram capsule  Generic drug: omega-3 acid ethyl esters  Take 2 g by mouth once daily.     meclizine 25 mg tablet  Commonly known as: ANTIVERT  meclizine 25 mg tablet     NovoLOG Mix 70-30FlexPen U-100 100 unit/mL (70-30) Inpn pen  Generic drug: insulin aspart protamine-insulin aspart  Novolog Mix 70-30 FlexPen U-100 Insulin 100 unit/mL subcutaneous pen     quinapriL-hydrochlorothiazide 20-12.5 mg per tablet  Commonly known as: ACCURETIC  quinapril 20 mg-hydrochlorothiazide 12.5 mg tablet     simvastatin 20 MG tablet  Commonly known as: ZOCOR  every other day. HS  Monday, Wednesday, Friday     TRUE METRIX GLUCOSE TEST STRIP Strp  Generic drug: blood sugar diagnostic     VICTOZA 3-NANCY 0.6 mg/0.1 mL (18 mg/3 mL) Pnij  Generic drug: liraglutide 0.6 mg/0.1 mL (18 mg/3 mL) subq PNIJ  once daily. 1.8 MG AM     XIGDUO XR 5-1,000 mg Tbph  Generic drug: dapagliflozin-metformin  Xigduo XR 5 mg-1,000 mg tablet,extended release   Take 1 tablet twice a day by oral route for 90 days.         * This list has 2 medication(s) that are the same as other medications prescribed for you. Read the directions carefully, and ask your doctor or other care provider to review them with you.                Indwelling Lines/Drains at time of discharge:   Lines/Drains/Airways     Airway                 Airway - Non-Surgical 02/07/20 0914 Nasal Cannula 141 days         Oral Airway 02/07/20 0955 Barnes 90 141 days                Time spent on the discharge of patient: 30 minutes  Patient was seen and examined on the date of discharge and determined to be suitable for discharge.         Randolph Ho MD  Department of Hospital Medicine  [facility]

## 2020-06-29 LAB — BACTERIA SPEC AEROBE CULT: ABNORMAL

## 2020-06-29 NOTE — PLAN OF CARE
06/29/20 1019   Final Note   Assessment Type Final Discharge Note       Patient has appointment already scheduled with Dr Arreola's office for Tuesday, June 30th at 3pm.  Patient declined follow up appointment with primary care at this time as she is seeing podiatry and avoiding primary care clinics at this time. No other needs voiced at this time.

## 2020-06-30 ENCOUNTER — OFFICE VISIT (OUTPATIENT)
Dept: PODIATRY | Facility: CLINIC | Age: 70
End: 2020-06-30
Payer: MEDICARE

## 2020-06-30 VITALS
HEART RATE: 81 BPM | BODY MASS INDEX: 44.39 KG/M2 | TEMPERATURE: 99 F | WEIGHT: 260 LBS | HEIGHT: 64 IN | DIASTOLIC BLOOD PRESSURE: 76 MMHG | OXYGEN SATURATION: 98 % | SYSTOLIC BLOOD PRESSURE: 149 MMHG

## 2020-06-30 DIAGNOSIS — L97.522 SKIN ULCER OF LEFT FOOT WITH FAT LAYER EXPOSED: Primary | ICD-10-CM

## 2020-06-30 DIAGNOSIS — E10.40 TYPE 1 DIABETES MELLITUS WITH DIABETIC NEUROPATHY: ICD-10-CM

## 2020-06-30 DIAGNOSIS — L03.116 CELLULITIS OF LEFT LEG: ICD-10-CM

## 2020-06-30 PROCEDURE — 1126F PR PAIN SEVERITY QUANTIFIED, NO PAIN PRESENT: ICD-10-PCS | Mod: S$GLB,,, | Performed by: PODIATRIST

## 2020-06-30 PROCEDURE — 1126F AMNT PAIN NOTED NONE PRSNT: CPT | Mod: S$GLB,,, | Performed by: PODIATRIST

## 2020-06-30 PROCEDURE — 1159F PR MEDICATION LIST DOCUMENTED IN MEDICAL RECORD: ICD-10-PCS | Mod: S$GLB,,, | Performed by: PODIATRIST

## 2020-06-30 PROCEDURE — 3078F DIAST BP <80 MM HG: CPT | Mod: CPTII,S$GLB,, | Performed by: PODIATRIST

## 2020-06-30 PROCEDURE — 3078F PR MOST RECENT DIASTOLIC BLOOD PRESSURE < 80 MM HG: ICD-10-PCS | Mod: CPTII,S$GLB,, | Performed by: PODIATRIST

## 2020-06-30 PROCEDURE — 99999 PR PBB SHADOW E&M-EST. PATIENT-LVL IV: ICD-10-PCS | Mod: PBBFAC,,, | Performed by: PODIATRIST

## 2020-06-30 PROCEDURE — 3008F BODY MASS INDEX DOCD: CPT | Mod: CPTII,S$GLB,, | Performed by: PODIATRIST

## 2020-06-30 PROCEDURE — 99214 OFFICE O/P EST MOD 30 MIN: CPT | Mod: S$GLB,,, | Performed by: PODIATRIST

## 2020-06-30 PROCEDURE — 3044F PR MOST RECENT HEMOGLOBIN A1C LEVEL <7.0%: ICD-10-PCS | Mod: CPTII,S$GLB,, | Performed by: PODIATRIST

## 2020-06-30 PROCEDURE — 1159F MED LIST DOCD IN RCRD: CPT | Mod: S$GLB,,, | Performed by: PODIATRIST

## 2020-06-30 PROCEDURE — 1101F PR PT FALLS ASSESS DOC 0-1 FALLS W/OUT INJ PAST YR: ICD-10-PCS | Mod: CPTII,S$GLB,, | Performed by: PODIATRIST

## 2020-06-30 PROCEDURE — 3077F SYST BP >= 140 MM HG: CPT | Mod: CPTII,S$GLB,, | Performed by: PODIATRIST

## 2020-06-30 PROCEDURE — 99999 PR PBB SHADOW E&M-EST. PATIENT-LVL IV: CPT | Mod: PBBFAC,,, | Performed by: PODIATRIST

## 2020-06-30 PROCEDURE — 3044F HG A1C LEVEL LT 7.0%: CPT | Mod: CPTII,S$GLB,, | Performed by: PODIATRIST

## 2020-06-30 PROCEDURE — 99214 PR OFFICE/OUTPT VISIT, EST, LEVL IV, 30-39 MIN: ICD-10-PCS | Mod: S$GLB,,, | Performed by: PODIATRIST

## 2020-06-30 PROCEDURE — 3008F PR BODY MASS INDEX (BMI) DOCUMENTED: ICD-10-PCS | Mod: CPTII,S$GLB,, | Performed by: PODIATRIST

## 2020-06-30 PROCEDURE — 3077F PR MOST RECENT SYSTOLIC BLOOD PRESSURE >= 140 MM HG: ICD-10-PCS | Mod: CPTII,S$GLB,, | Performed by: PODIATRIST

## 2020-06-30 PROCEDURE — 1101F PT FALLS ASSESS-DOCD LE1/YR: CPT | Mod: CPTII,S$GLB,, | Performed by: PODIATRIST

## 2020-06-30 RX ORDER — DOXYCYCLINE 100 MG/1
CAPSULE ORAL
COMMUNITY
Start: 2020-06-16 | End: 2021-01-11

## 2020-06-30 RX ORDER — SULFAMETHOXAZOLE AND TRIMETHOPRIM 400; 80 MG/1; MG/1
2 TABLET ORAL 2 TIMES DAILY
Qty: 40 TABLET | Refills: 0 | Status: SHIPPED | OUTPATIENT
Start: 2020-06-30 | End: 2020-07-10

## 2020-07-01 LAB
BACTERIA BLD CULT: NORMAL
BACTERIA BLD CULT: NORMAL

## 2020-07-05 NOTE — PROGRESS NOTES
Subjective:       Patient ID: Marifer Bernardo is a 70 y.o. female.    Chief Complaint: Diabetes Mellitus (skin ulcer left foot)   Patient presents follow-up wound care she was recently treated in the hospital for cellulitis of the left lower extremity.  Follow-up  Associated symptoms include arthralgias, joint swelling and numbness.   Foot Ulcer  Associated symptoms include arthralgias, joint swelling and numbness.   Nail Problem  Associated symptoms include arthralgias, joint swelling and numbness.      Review of Systems   Musculoskeletal: Positive for arthralgias, gait problem and joint swelling.   Neurological: Positive for numbness.   All other systems reviewed and are negative.      Objective:      Physical Exam  Vitals signs and nursing note reviewed.   Constitutional:       Appearance: She is well-developed.   Cardiovascular:      Rate and Rhythm: Normal rate and regular rhythm.      Pulses:           Dorsalis pedis pulses are 1+ on the right side and 1+ on the left side.        Posterior tibial pulses are 1+ on the right side and 1+ on the left side.      Heart sounds: Normal heart sounds.   Pulmonary:      Effort: Pulmonary effort is normal.      Breath sounds: Normal breath sounds.   Musculoskeletal: Normal range of motion.         General: Tenderness and deformity present.      Left foot: Deformity present.        Feet:            Feet:      Right foot:      Protective Sensation: 4 sites tested. 3 sites sensed.      Left foot:      Protective Sensation: 4 sites tested. 3 sites sensed.      Skin integrity: Ulcer, skin breakdown, erythema and callus present.   Skin:     General: Skin is warm.      Capillary Refill: Capillary refill takes more than 3 seconds.      Findings: Erythema present.   Neurological:      Mental Status: She is alert.   Psychiatric:         Behavior: Behavior normal.         Thought Content: Thought content normal.         Judgment: Judgment normal.             Assessment:       1. Skin  ulcer of left foot with fat layer exposed    2. Type 1 diabetes mellitus with diabetic neuropathy    3. Cellulitis of left leg        Plan:       Patient presents today for follow-up of an ulceration on the plantar surface of the patient's left hallux.   Non excisional debridement was performed on the area the ulceration is approximately 2 cm long by 1 cm wide by 3 mm deep.  Patient was recently in the hospital where I treated her for cellulitis of her left lower extremity her culture and sensitivity was positive for strep of the left foot.  Patient is currently taking Cipro I am also going to start her on Bactrim she still has some degree of cellulitis in the left lower extremity while it has improved and is not resolved I am going to have the patient discontinue the clindamycin she was given at the time of discharge.  Patient states she really has not stayed off her foot she has been on it quite a bit since discharge.  Patient was advised the wound is very dry there is no drainage I cannot completely conclude that the cellulitis was from this wound site the patient did have an infection of the proximal nail fold of left hallux about a week before she was admitted to the hospital it is possible this may have been the source of the infection ultimately patient needs to stay off of her foot she needs to keep it elevated I did dispense a postoperative shoe today advising her she needs to keep a larger dressing on her foot she cannot try to wear regular shoe with the swelling and redness that she currently has.  Plan follow-up will be about 10 days patient may have to consider surgical excision of the area and primary closure once the infection is resolved.  Patient advised to contact me with any problems questions or concerns prior to her scheduled follow-up.  Face-to-face time equaled 25 min.  This note was created using [employer] voice recognition software that occasionally misinterpreted phrases or words.

## 2020-07-09 ENCOUNTER — OFFICE VISIT (OUTPATIENT)
Dept: PODIATRY | Facility: CLINIC | Age: 70
End: 2020-07-09
Payer: MEDICARE

## 2020-07-09 VITALS
DIASTOLIC BLOOD PRESSURE: 74 MMHG | HEIGHT: 64 IN | BODY MASS INDEX: 44.39 KG/M2 | WEIGHT: 260 LBS | HEART RATE: 84 BPM | TEMPERATURE: 99 F | SYSTOLIC BLOOD PRESSURE: 136 MMHG

## 2020-07-09 DIAGNOSIS — L03.116 CELLULITIS OF LEFT LEG: ICD-10-CM

## 2020-07-09 DIAGNOSIS — L03.116 CELLULITIS OF LEFT LOWER EXTREMITY: ICD-10-CM

## 2020-07-09 DIAGNOSIS — L97.522 SKIN ULCER OF LEFT FOOT WITH FAT LAYER EXPOSED: Primary | ICD-10-CM

## 2020-07-09 DIAGNOSIS — E10.40 TYPE 1 DIABETES MELLITUS WITH DIABETIC NEUROPATHY: ICD-10-CM

## 2020-07-09 PROCEDURE — 3044F PR MOST RECENT HEMOGLOBIN A1C LEVEL <7.0%: ICD-10-PCS | Mod: CPTII,S$GLB,, | Performed by: PODIATRIST

## 2020-07-09 PROCEDURE — 1125F AMNT PAIN NOTED PAIN PRSNT: CPT | Mod: S$GLB,,, | Performed by: PODIATRIST

## 2020-07-09 PROCEDURE — 99214 PR OFFICE/OUTPT VISIT, EST, LEVL IV, 30-39 MIN: ICD-10-PCS | Mod: S$GLB,,, | Performed by: PODIATRIST

## 2020-07-09 PROCEDURE — 3078F DIAST BP <80 MM HG: CPT | Mod: CPTII,S$GLB,, | Performed by: PODIATRIST

## 2020-07-09 PROCEDURE — 3008F BODY MASS INDEX DOCD: CPT | Mod: CPTII,S$GLB,, | Performed by: PODIATRIST

## 2020-07-09 PROCEDURE — 99999 PR PBB SHADOW E&M-EST. PATIENT-LVL IV: CPT | Mod: PBBFAC,,, | Performed by: PODIATRIST

## 2020-07-09 PROCEDURE — 3078F PR MOST RECENT DIASTOLIC BLOOD PRESSURE < 80 MM HG: ICD-10-PCS | Mod: CPTII,S$GLB,, | Performed by: PODIATRIST

## 2020-07-09 PROCEDURE — 1101F PR PT FALLS ASSESS DOC 0-1 FALLS W/OUT INJ PAST YR: ICD-10-PCS | Mod: CPTII,S$GLB,, | Performed by: PODIATRIST

## 2020-07-09 PROCEDURE — 1125F PR PAIN SEVERITY QUANTIFIED, PAIN PRESENT: ICD-10-PCS | Mod: S$GLB,,, | Performed by: PODIATRIST

## 2020-07-09 PROCEDURE — 3008F PR BODY MASS INDEX (BMI) DOCUMENTED: ICD-10-PCS | Mod: CPTII,S$GLB,, | Performed by: PODIATRIST

## 2020-07-09 PROCEDURE — 3044F HG A1C LEVEL LT 7.0%: CPT | Mod: CPTII,S$GLB,, | Performed by: PODIATRIST

## 2020-07-09 PROCEDURE — 99999 PR PBB SHADOW E&M-EST. PATIENT-LVL IV: ICD-10-PCS | Mod: PBBFAC,,, | Performed by: PODIATRIST

## 2020-07-09 PROCEDURE — 1159F PR MEDICATION LIST DOCUMENTED IN MEDICAL RECORD: ICD-10-PCS | Mod: S$GLB,,, | Performed by: PODIATRIST

## 2020-07-09 PROCEDURE — 3075F SYST BP GE 130 - 139MM HG: CPT | Mod: CPTII,S$GLB,, | Performed by: PODIATRIST

## 2020-07-09 PROCEDURE — 1101F PT FALLS ASSESS-DOCD LE1/YR: CPT | Mod: CPTII,S$GLB,, | Performed by: PODIATRIST

## 2020-07-09 PROCEDURE — 3075F PR MOST RECENT SYSTOLIC BLOOD PRESS GE 130-139MM HG: ICD-10-PCS | Mod: CPTII,S$GLB,, | Performed by: PODIATRIST

## 2020-07-09 PROCEDURE — 99214 OFFICE O/P EST MOD 30 MIN: CPT | Mod: S$GLB,,, | Performed by: PODIATRIST

## 2020-07-09 PROCEDURE — 1159F MED LIST DOCD IN RCRD: CPT | Mod: S$GLB,,, | Performed by: PODIATRIST

## 2020-07-09 NOTE — LETTER
July 12, 2020      Jose Mary MD  [address] [facility] [place] [address]           [facility]  [facility] [facility] [address]  Phone: [phone]  Fax: [phone]          Patient: Marifer Bernardo   MR Number: [id number]   YOB: 1950   Date of Visit: 7/9/2020       Dear Dr. Jose Mary:    Thank you for referring Marifer Bernardo to me for evaluation. Attached you will find relevant portions of my assessment and plan of care.    If you have questions, please do not hesitate to call me. I look forward to following Marifer Bernardo along with you.    Sincerely,    Osvaldo Arreola, DPRICK    Enclosure  CC:  No Recipients    If you would like to receive this communication electronically, please contact [email or web address] or [phone] to request more information on [employer] Link access.    For providers and/or their staff who would like to refer a patient to [facility], please contact us through our one-stop-shop provider referral line, [facility], at [phone].    If you feel you have received this communication in error or would no longer like to receive these types of communications, please e-mail [email or web address]

## 2020-07-12 NOTE — PROGRESS NOTES
Subjective:       Patient ID: Marifer Bernardo is a 70 y.o. female.    Chief Complaint: Follow-up, Diabetes Mellitus, and Foot Ulcer   Patient presents follow-up wound care she was recently treated in the hospital for cellulitis of the left lower extremity.  Follow-up  Associated symptoms include arthralgias, joint swelling and numbness.   Foot Ulcer  Associated symptoms include arthralgias, joint swelling and numbness.   Nail Problem  Associated symptoms include arthralgias, joint swelling and numbness.      Review of Systems   Musculoskeletal: Positive for arthralgias, gait problem and joint swelling.   Neurological: Positive for numbness.   All other systems reviewed and are negative.      Objective:      Physical Exam  Vitals signs and nursing note reviewed.   Constitutional:       Appearance: She is well-developed.   Cardiovascular:      Rate and Rhythm: Normal rate and regular rhythm.      Pulses:           Dorsalis pedis pulses are 1+ on the right side and 1+ on the left side.        Posterior tibial pulses are 1+ on the right side and 1+ on the left side.      Heart sounds: Normal heart sounds.   Pulmonary:      Effort: Pulmonary effort is normal.      Breath sounds: Normal breath sounds.   Musculoskeletal: Normal range of motion.         General: Tenderness and deformity present.      Left foot: Deformity present.        Feet:    Feet:      Right foot:      Protective Sensation: 4 sites tested. 3 sites sensed.      Left foot:      Protective Sensation: 4 sites tested. 3 sites sensed.      Skin integrity: Ulcer, skin breakdown, erythema and callus present.   Skin:     General: Skin is warm.      Capillary Refill: Capillary refill takes more than 3 seconds.      Findings: Erythema present.   Neurological:      Mental Status: She is alert.   Psychiatric:         Behavior: Behavior normal.         Thought Content: Thought content normal.         Judgment: Judgment normal.                     Assessment:       1.  Skin ulcer of left foot with fat layer exposed    2. Cellulitis of left leg    3. Cellulitis of left lower extremity    4. Type 1 diabetes mellitus with diabetic neuropathy        Plan:       Patient presents today for follow-up of an ulceration on the plantar surface of the patient's left hallux.   Non excisional debridement was performed on the area the ulceration is approximately 2 cm long by 1 cm wide by 3 mm deep.  Patient was recently in the hospital where I treated her for cellulitis of her left lower extremity her culture and sensitivity was positive for strep of the left foot.  Patient is currently taking Cipro I am also going to start her on Bactrim she still has some degree of cellulitis in the left lower extremity while it has improved and is not resolved I am going to have the patient discontinue the clindamycin she was given at the time of discharge.  Patient states she really has not stayed off her foot she has been on it quite a bit since discharge.  Patient was advised the wound is very dry there is no drainage I cannot completely conclude that the cellulitis was from this wound site the patient did have an infection of the proximal nail fold of left hallux about a week before she was admitted to the hospital it is possible this may have been the source of the infection ultimately patient needs to stay off of her foot she needs to keep it elevated I did dispense a postoperative shoe today advising her she needs to keep a larger dressing on her foot she cannot try to wear regular shoe with the swelling and redness that she currently has.  Plan follow-up will be about 7 days patient may have to consider surgical excision of the area and primary closure once the infection is resolved.  Patient advised to contact me with any problems questions or concerns prior to her scheduled follow-up.  Patient is not wearing her fracture boot as instructed and she has not keeping a good padded dressing on the area.   Patient is to finish up her antibiotics as directed.  I did advised the patient she is to continue to clean the area with Dakin's I dispensed her a wound gel sheet Suprasorb to put over the area I want her to do this daily to help keep the better area better hydrated and to hopefully cut down the pressure on the area so this can heal.  Face-to-face time equaled 25 min.  This note was created using [employer] voice recognition software that occasionally misinterpreted phrases or words.

## 2020-07-16 ENCOUNTER — OFFICE VISIT (OUTPATIENT)
Dept: PODIATRY | Facility: CLINIC | Age: 70
End: 2020-07-16
Payer: MEDICARE

## 2020-07-16 VITALS
HEIGHT: 64 IN | TEMPERATURE: 99 F | SYSTOLIC BLOOD PRESSURE: 172 MMHG | HEART RATE: 76 BPM | WEIGHT: 260 LBS | DIASTOLIC BLOOD PRESSURE: 82 MMHG | BODY MASS INDEX: 44.39 KG/M2

## 2020-07-16 DIAGNOSIS — M19.072 OSTEOARTHRITIS OF LEFT ANKLE AND FOOT: ICD-10-CM

## 2020-07-16 DIAGNOSIS — E10.40 TYPE 1 DIABETES MELLITUS WITH DIABETIC NEUROPATHY: Primary | ICD-10-CM

## 2020-07-16 DIAGNOSIS — L97.522 SKIN ULCER OF LEFT FOOT WITH FAT LAYER EXPOSED: ICD-10-CM

## 2020-07-16 PROCEDURE — 3008F PR BODY MASS INDEX (BMI) DOCUMENTED: ICD-10-PCS | Mod: CPTII,S$GLB,, | Performed by: PODIATRIST

## 2020-07-16 PROCEDURE — 3079F PR MOST RECENT DIASTOLIC BLOOD PRESSURE 80-89 MM HG: ICD-10-PCS | Mod: CPTII,S$GLB,, | Performed by: PODIATRIST

## 2020-07-16 PROCEDURE — 3008F BODY MASS INDEX DOCD: CPT | Mod: CPTII,S$GLB,, | Performed by: PODIATRIST

## 2020-07-16 PROCEDURE — 3077F PR MOST RECENT SYSTOLIC BLOOD PRESSURE >= 140 MM HG: ICD-10-PCS | Mod: CPTII,S$GLB,, | Performed by: PODIATRIST

## 2020-07-16 PROCEDURE — 99999 PR PBB SHADOW E&M-EST. PATIENT-LVL IV: CPT | Mod: PBBFAC,,, | Performed by: PODIATRIST

## 2020-07-16 PROCEDURE — 1159F MED LIST DOCD IN RCRD: CPT | Mod: S$GLB,,, | Performed by: PODIATRIST

## 2020-07-16 PROCEDURE — 1126F PR PAIN SEVERITY QUANTIFIED, NO PAIN PRESENT: ICD-10-PCS | Mod: S$GLB,,, | Performed by: PODIATRIST

## 2020-07-16 PROCEDURE — 1126F AMNT PAIN NOTED NONE PRSNT: CPT | Mod: S$GLB,,, | Performed by: PODIATRIST

## 2020-07-16 PROCEDURE — 1101F PR PT FALLS ASSESS DOC 0-1 FALLS W/OUT INJ PAST YR: ICD-10-PCS | Mod: CPTII,S$GLB,, | Performed by: PODIATRIST

## 2020-07-16 PROCEDURE — 99214 PR OFFICE/OUTPT VISIT, EST, LEVL IV, 30-39 MIN: ICD-10-PCS | Mod: S$GLB,,, | Performed by: PODIATRIST

## 2020-07-16 PROCEDURE — 3079F DIAST BP 80-89 MM HG: CPT | Mod: CPTII,S$GLB,, | Performed by: PODIATRIST

## 2020-07-16 PROCEDURE — 3044F PR MOST RECENT HEMOGLOBIN A1C LEVEL <7.0%: ICD-10-PCS | Mod: CPTII,S$GLB,, | Performed by: PODIATRIST

## 2020-07-16 PROCEDURE — 99214 OFFICE O/P EST MOD 30 MIN: CPT | Mod: S$GLB,,, | Performed by: PODIATRIST

## 2020-07-16 PROCEDURE — 1101F PT FALLS ASSESS-DOCD LE1/YR: CPT | Mod: CPTII,S$GLB,, | Performed by: PODIATRIST

## 2020-07-16 PROCEDURE — 3044F HG A1C LEVEL LT 7.0%: CPT | Mod: CPTII,S$GLB,, | Performed by: PODIATRIST

## 2020-07-16 PROCEDURE — 1159F PR MEDICATION LIST DOCUMENTED IN MEDICAL RECORD: ICD-10-PCS | Mod: S$GLB,,, | Performed by: PODIATRIST

## 2020-07-16 PROCEDURE — 99999 PR PBB SHADOW E&M-EST. PATIENT-LVL IV: ICD-10-PCS | Mod: PBBFAC,,, | Performed by: PODIATRIST

## 2020-07-16 PROCEDURE — 3077F SYST BP >= 140 MM HG: CPT | Mod: CPTII,S$GLB,, | Performed by: PODIATRIST

## 2020-07-16 NOTE — LETTER
July 21, 2020      Jose Mary MD  [address] [facility] [place] [address]           [facility]  [facility] [facility] [address]  Phone: [phone]  Fax: [phone]          Patient: Marifer Bernardo   MR Number: [id number]   YOB: 1950   Date of Visit: 7/16/2020       Dear Dr. Jose Mary:    Thank you for referring Marifer Bernardo to me for evaluation. Attached you will find relevant portions of my assessment and plan of care.    If you have questions, please do not hesitate to call me. I look forward to following Marifer Bernardo along with you.    Sincerely,    Osvaldo Arreola, DPRICK    Enclosure  CC:  No Recipients    If you would like to receive this communication electronically, please contact [email or web address] or [phone] to request more information on [employer] Link access.    For providers and/or their staff who would like to refer a patient to [facility], please contact us through our one-stop-shop provider referral line, [facility], at [phone].    If you feel you have received this communication in error or would no longer like to receive these types of communications, please e-mail [email or web address]

## 2020-07-21 NOTE — PROGRESS NOTES
Subjective:       Patient ID: Marifer Bernardo is a 70 y.o. female.    Chief Complaint: Foot Pain, Foot Problem, and Foot Ulcer   Patient presents follow-up wound care she was recently treated in the hospital for cellulitis of the left lower extremity.  Follow-up  Associated symptoms include arthralgias, joint swelling and numbness.   Foot Ulcer  Associated symptoms include arthralgias, joint swelling and numbness.   Nail Problem  Associated symptoms include arthralgias, joint swelling and numbness.   Foot Pain  Associated symptoms include arthralgias, joint swelling and numbness.      Review of Systems   Musculoskeletal: Positive for arthralgias, gait problem and joint swelling.   Neurological: Positive for numbness.   All other systems reviewed and are negative.      Objective:      Physical Exam  Vitals signs and nursing note reviewed.   Constitutional:       Appearance: She is well-developed.   Cardiovascular:      Rate and Rhythm: Normal rate and regular rhythm.      Pulses:           Dorsalis pedis pulses are 1+ on the right side and 1+ on the left side.        Posterior tibial pulses are 1+ on the right side and 1+ on the left side.      Heart sounds: Normal heart sounds.   Pulmonary:      Effort: Pulmonary effort is normal.      Breath sounds: Normal breath sounds.   Musculoskeletal: Normal range of motion.         General: Tenderness and deformity present.      Left foot: Deformity present.        Feet:    Feet:      Right foot:      Protective Sensation: 4 sites tested. 3 sites sensed.      Left foot:      Protective Sensation: 4 sites tested. 3 sites sensed.      Skin integrity: Ulcer, skin breakdown, erythema and callus present.   Skin:     General: Skin is warm.      Capillary Refill: Capillary refill takes more than 3 seconds.      Findings: Erythema present.   Neurological:      Mental Status: She is alert.   Psychiatric:         Behavior: Behavior normal.         Thought Content: Thought content  normal.         Judgment: Judgment normal.                     Assessment:       1. Type 1 diabetes mellitus with diabetic neuropathy    2. Skin ulcer of left foot with fat layer exposed    3. Osteoarthritis of left ankle and foot        Plan:       Patient presents today for follow-up of an ulceration on the plantar surface of the patient's left hallux.   Non excisional debridement was performed on the area the ulceration is approximately 2 cm long by 1 cm wide by 3 mm deep.  Patient was recently in the hospital where I treated her for cellulitis of her left lower extremity her culture and sensitivity was positive for strep of the left foot.Previously noted cellulitis and edema in the left lower extremity is completely resolved at this time there is been no significant change in the wound itself underlying the plantar left hallux.  I did discuss at length and in detail with the patient treatment options for getting this wound completely closed and resolved I had wanted the patient to wear fracture boot she does not want to wear the fracture boot I did risks discussed with the patient Regranex wound gel to try to get this area healed patient was in agreement with this were going to go ahead and order this and apply this daily with a wet-to-dry dressing I have advised the patient this is very expensive medication she stated that she was okay with that if it was going to help to get the area closed the other option would be surgical closure of the area which we will defer as a last resort as necessary.  Patient was in understanding and agreement with that I will see the patient for follow-up in 1 week the ran neck Regranex has been approved the patient should start to use it as directed.  I do want the patient to use the horseshoe pads over the area to offload pressure while she is using the Regranex.  Face-to-face time equaled 25 min.  This note was created using [employer] voice recognition software that occasionally  misinterpreted phrases or words.

## 2020-07-23 ENCOUNTER — OFFICE VISIT (OUTPATIENT)
Dept: PODIATRY | Facility: CLINIC | Age: 70
End: 2020-07-23
Payer: MEDICARE

## 2020-07-23 VITALS
WEIGHT: 260 LBS | HEART RATE: 81 BPM | SYSTOLIC BLOOD PRESSURE: 177 MMHG | TEMPERATURE: 98 F | DIASTOLIC BLOOD PRESSURE: 72 MMHG | BODY MASS INDEX: 44.39 KG/M2 | HEIGHT: 64 IN

## 2020-07-23 DIAGNOSIS — E10.40 TYPE 1 DIABETES MELLITUS WITH DIABETIC NEUROPATHY: ICD-10-CM

## 2020-07-23 DIAGNOSIS — L97.522 SKIN ULCER OF LEFT FOOT WITH FAT LAYER EXPOSED: Primary | ICD-10-CM

## 2020-07-23 PROCEDURE — 3077F PR MOST RECENT SYSTOLIC BLOOD PRESSURE >= 140 MM HG: ICD-10-PCS | Mod: CPTII,S$GLB,, | Performed by: PODIATRIST

## 2020-07-23 PROCEDURE — 1159F MED LIST DOCD IN RCRD: CPT | Mod: S$GLB,,, | Performed by: PODIATRIST

## 2020-07-23 PROCEDURE — 99213 OFFICE O/P EST LOW 20 MIN: CPT | Mod: S$GLB,,, | Performed by: PODIATRIST

## 2020-07-23 PROCEDURE — 3078F DIAST BP <80 MM HG: CPT | Mod: CPTII,S$GLB,, | Performed by: PODIATRIST

## 2020-07-23 PROCEDURE — 1159F PR MEDICATION LIST DOCUMENTED IN MEDICAL RECORD: ICD-10-PCS | Mod: S$GLB,,, | Performed by: PODIATRIST

## 2020-07-23 PROCEDURE — 1101F PR PT FALLS ASSESS DOC 0-1 FALLS W/OUT INJ PAST YR: ICD-10-PCS | Mod: CPTII,S$GLB,, | Performed by: PODIATRIST

## 2020-07-23 PROCEDURE — 3008F BODY MASS INDEX DOCD: CPT | Mod: CPTII,S$GLB,, | Performed by: PODIATRIST

## 2020-07-23 PROCEDURE — 3044F HG A1C LEVEL LT 7.0%: CPT | Mod: CPTII,S$GLB,, | Performed by: PODIATRIST

## 2020-07-23 PROCEDURE — 3078F PR MOST RECENT DIASTOLIC BLOOD PRESSURE < 80 MM HG: ICD-10-PCS | Mod: CPTII,S$GLB,, | Performed by: PODIATRIST

## 2020-07-23 PROCEDURE — 99213 PR OFFICE/OUTPT VISIT, EST, LEVL III, 20-29 MIN: ICD-10-PCS | Mod: S$GLB,,, | Performed by: PODIATRIST

## 2020-07-23 PROCEDURE — 99999 PR PBB SHADOW E&M-EST. PATIENT-LVL IV: ICD-10-PCS | Mod: PBBFAC,,, | Performed by: PODIATRIST

## 2020-07-23 PROCEDURE — 1126F AMNT PAIN NOTED NONE PRSNT: CPT | Mod: S$GLB,,, | Performed by: PODIATRIST

## 2020-07-23 PROCEDURE — 99999 PR PBB SHADOW E&M-EST. PATIENT-LVL IV: CPT | Mod: PBBFAC,,, | Performed by: PODIATRIST

## 2020-07-23 PROCEDURE — 3044F PR MOST RECENT HEMOGLOBIN A1C LEVEL <7.0%: ICD-10-PCS | Mod: CPTII,S$GLB,, | Performed by: PODIATRIST

## 2020-07-23 PROCEDURE — 3077F SYST BP >= 140 MM HG: CPT | Mod: CPTII,S$GLB,, | Performed by: PODIATRIST

## 2020-07-23 PROCEDURE — 3008F PR BODY MASS INDEX (BMI) DOCUMENTED: ICD-10-PCS | Mod: CPTII,S$GLB,, | Performed by: PODIATRIST

## 2020-07-23 PROCEDURE — 1101F PT FALLS ASSESS-DOCD LE1/YR: CPT | Mod: CPTII,S$GLB,, | Performed by: PODIATRIST

## 2020-07-23 PROCEDURE — 1126F PR PAIN SEVERITY QUANTIFIED, NO PAIN PRESENT: ICD-10-PCS | Mod: S$GLB,,, | Performed by: PODIATRIST

## 2020-07-23 NOTE — LETTER
July 28, 2020      Jose Mary MD  [address] [facility] [place] [address]           [facility] - Podiatry/Wound Care  [facility] [facility] [address]  Phone: [phone]  Fax: [phone]          Patient: Marifer Bernardo   MR Number: [id number]   YOB: 1950   Date of Visit: 7/23/2020       Dear Dr. Jose Mary:    Thank you for referring Marifer Bernardo to me for evaluation. Attached you will find relevant portions of my assessment and plan of care.    If you have questions, please do not hesitate to call me. I look forward to following Marifer Bernardo along with you.    Sincerely,    Osvaldo Arreola, DPRICK    Enclosure  CC:  No Recipients    If you would like to receive this communication electronically, please contact [email or web address] or [phone] to request more information on [employer] Link access.    For providers and/or their staff who would like to refer a patient to [facility], please contact us through our one-stop-shop provider referral line, [facility], at [phone].    If you feel you have received this communication in error or would no longer like to receive these types of communications, please e-mail [email or web address]

## 2020-07-28 NOTE — PROGRESS NOTES
Subjective:       Patient ID: Marifer Bernardo is a 70 y.o. female.    Chief Complaint: Follow-up, Foot Problem, Diabetes Mellitus, and Foot Ulcer   Patient presents follow-up wound care she was recently treated in the hospital for cellulitis of the left lower extremity.  Foot Pain  Associated symptoms include arthralgias, joint swelling and numbness.   Foot Ulcer  Associated symptoms include arthralgias, joint swelling and numbness.   Follow-up  Associated symptoms include arthralgias, joint swelling and numbness.   Nail Problem  Associated symptoms include arthralgias, joint swelling and numbness.      Review of Systems   Musculoskeletal: Positive for arthralgias, gait problem and joint swelling.   Neurological: Positive for numbness.   All other systems reviewed and are negative.      Objective:      Physical Exam  Vitals signs and nursing note reviewed.   Constitutional:       Appearance: She is well-developed.   Cardiovascular:      Rate and Rhythm: Normal rate and regular rhythm.      Pulses:           Dorsalis pedis pulses are 1+ on the right side and 1+ on the left side.        Posterior tibial pulses are 1+ on the right side and 1+ on the left side.      Heart sounds: Normal heart sounds.   Pulmonary:      Effort: Pulmonary effort is normal.      Breath sounds: Normal breath sounds.   Musculoskeletal: Normal range of motion.         General: Tenderness and deformity present.      Left foot: Deformity present.        Feet:    Feet:      Right foot:      Protective Sensation: 4 sites tested. 3 sites sensed.      Left foot:      Protective Sensation: 4 sites tested. 3 sites sensed.      Skin integrity: Ulcer, skin breakdown, erythema and callus present.   Skin:     General: Skin is warm.      Capillary Refill: Capillary refill takes more than 3 seconds.      Findings: Erythema present.   Neurological:      Mental Status: She is alert.   Psychiatric:         Behavior: Behavior normal.         Thought Content:  Thought content normal.         Judgment: Judgment normal.                     Assessment:       1. Skin ulcer of left foot with fat layer exposed    2. Type 1 diabetes mellitus with diabetic neuropathy        Plan:       Patient presents today for follow-up of an ulceration on the plantar surface of the patient's left hallux.   Non excisional debridement was performed on the area the ulceration is approximately 2 cm long by 1 cm wide by 3 mm deep.  Patient was recently in the hospital where I treated her for cellulitis of her left lower extremity her culture and sensitivity was positive for strep of the left foot.Previously noted cellulitis and edema in the left lower extremity is completely resolved at this time there is been no significant change in the wound itself underlying the plantar left hallux.  I did discuss at length and in detail with the patient treatment options for getting this wound completely closed and resolved I had wanted the patient to wear fracture boot she does not want to wear the fracture boot.  I patient is currently using Regranex as ordered and has only used it for 2 days in a row but the wound already looks better it is slightly smaller and there is decreased depth to the area itself.  Patient will continue to apply this as directed plan follow-up will be 2 weeks.  Non excisional debridement was performed today.  Patient advised it is very important to keep a well-padded dressing on the area keep it clean dry and change the dressing daily.  This note was created using [employer] voice recognition software that occasionally misinterpreted phrases or words.

## 2020-08-06 ENCOUNTER — OFFICE VISIT (OUTPATIENT)
Dept: PODIATRY | Facility: CLINIC | Age: 70
End: 2020-08-06
Payer: MEDICARE

## 2020-08-06 VITALS
WEIGHT: 260 LBS | HEIGHT: 64 IN | HEART RATE: 75 BPM | TEMPERATURE: 98 F | SYSTOLIC BLOOD PRESSURE: 150 MMHG | BODY MASS INDEX: 44.39 KG/M2 | DIASTOLIC BLOOD PRESSURE: 80 MMHG

## 2020-08-06 DIAGNOSIS — E10.40 TYPE 1 DIABETES MELLITUS WITH DIABETIC NEUROPATHY: Primary | ICD-10-CM

## 2020-08-06 DIAGNOSIS — L97.522 SKIN ULCER OF LEFT FOOT WITH FAT LAYER EXPOSED: ICD-10-CM

## 2020-08-06 PROCEDURE — 3044F PR MOST RECENT HEMOGLOBIN A1C LEVEL <7.0%: ICD-10-PCS | Mod: CPTII,S$GLB,, | Performed by: PODIATRIST

## 2020-08-06 PROCEDURE — 3079F PR MOST RECENT DIASTOLIC BLOOD PRESSURE 80-89 MM HG: ICD-10-PCS | Mod: CPTII,S$GLB,, | Performed by: PODIATRIST

## 2020-08-06 PROCEDURE — 1125F AMNT PAIN NOTED PAIN PRSNT: CPT | Mod: S$GLB,,, | Performed by: PODIATRIST

## 2020-08-06 PROCEDURE — 3079F DIAST BP 80-89 MM HG: CPT | Mod: CPTII,S$GLB,, | Performed by: PODIATRIST

## 2020-08-06 PROCEDURE — 3044F HG A1C LEVEL LT 7.0%: CPT | Mod: CPTII,S$GLB,, | Performed by: PODIATRIST

## 2020-08-06 PROCEDURE — 99999 PR PBB SHADOW E&M-EST. PATIENT-LVL IV: CPT | Mod: PBBFAC,,, | Performed by: PODIATRIST

## 2020-08-06 PROCEDURE — 1159F MED LIST DOCD IN RCRD: CPT | Mod: S$GLB,,, | Performed by: PODIATRIST

## 2020-08-06 PROCEDURE — 1101F PT FALLS ASSESS-DOCD LE1/YR: CPT | Mod: CPTII,S$GLB,, | Performed by: PODIATRIST

## 2020-08-06 PROCEDURE — 3008F BODY MASS INDEX DOCD: CPT | Mod: CPTII,S$GLB,, | Performed by: PODIATRIST

## 2020-08-06 PROCEDURE — 99999 PR PBB SHADOW E&M-EST. PATIENT-LVL IV: ICD-10-PCS | Mod: PBBFAC,,, | Performed by: PODIATRIST

## 2020-08-06 PROCEDURE — 3077F SYST BP >= 140 MM HG: CPT | Mod: CPTII,S$GLB,, | Performed by: PODIATRIST

## 2020-08-06 PROCEDURE — 1159F PR MEDICATION LIST DOCUMENTED IN MEDICAL RECORD: ICD-10-PCS | Mod: S$GLB,,, | Performed by: PODIATRIST

## 2020-08-06 PROCEDURE — 99213 OFFICE O/P EST LOW 20 MIN: CPT | Mod: S$GLB,,, | Performed by: PODIATRIST

## 2020-08-06 PROCEDURE — 1101F PR PT FALLS ASSESS DOC 0-1 FALLS W/OUT INJ PAST YR: ICD-10-PCS | Mod: CPTII,S$GLB,, | Performed by: PODIATRIST

## 2020-08-06 PROCEDURE — 99213 PR OFFICE/OUTPT VISIT, EST, LEVL III, 20-29 MIN: ICD-10-PCS | Mod: S$GLB,,, | Performed by: PODIATRIST

## 2020-08-06 PROCEDURE — 3008F PR BODY MASS INDEX (BMI) DOCUMENTED: ICD-10-PCS | Mod: CPTII,S$GLB,, | Performed by: PODIATRIST

## 2020-08-06 PROCEDURE — 3077F PR MOST RECENT SYSTOLIC BLOOD PRESSURE >= 140 MM HG: ICD-10-PCS | Mod: CPTII,S$GLB,, | Performed by: PODIATRIST

## 2020-08-06 PROCEDURE — 1125F PR PAIN SEVERITY QUANTIFIED, PAIN PRESENT: ICD-10-PCS | Mod: S$GLB,,, | Performed by: PODIATRIST

## 2020-08-06 RX ORDER — BECAPLERMIN 100 UG/G
GEL TOPICAL
COMMUNITY
End: 2023-07-11

## 2020-08-06 NOTE — LETTER
August 9, 2020      Jose Mary MD  [address] [facility] [place] [address]           [facility] - Podiatry/Wound Care  [facility] [facility] [address]  Phone: [phone]  Fax: [phone]          Patient: Marifer Bernardo   MR Number: [id number]   YOB: 1950   Date of Visit: 8/6/2020       Dear Dr. Jose Mary:    Thank you for referring Marifer Bernardo to me for evaluation. Attached you will find relevant portions of my assessment and plan of care.    If you have questions, please do not hesitate to call me. I look forward to following Mairfer Bernardo along with you.    Sincerely,    Osvaldo Arreola, DPRICK    Enclosure  CC:  No Recipients    If you would like to receive this communication electronically, please contact [email or web address] or [phone] to request more information on [employer] Link access.    For providers and/or their staff who would like to refer a patient to [facility], please contact us through our one-stop-shop provider referral line, [facility], at [phone].    If you feel you have received this communication in error or would no longer like to receive these types of communications, please e-mail [email or web address]

## 2020-08-09 NOTE — PROGRESS NOTES
Subjective:       Patient ID: Marifer Bernardo is a 70 y.o. female.    Chief Complaint: Follow-up, Diabetes Mellitus, Foot Ulcer, and Foot Problem   Patient presents follow-up wound care she was recently treated in the hospital for cellulitis of the left lower extremity.  Follow-up  Associated symptoms include arthralgias, joint swelling and numbness.   Foot Ulcer  Associated symptoms include arthralgias, joint swelling and numbness.   Foot Pain  Associated symptoms include arthralgias, joint swelling and numbness.   Nail Problem  Associated symptoms include arthralgias, joint swelling and numbness.      Review of Systems   Musculoskeletal: Positive for arthralgias, gait problem and joint swelling.   Neurological: Positive for numbness.   All other systems reviewed and are negative.      Objective:      Physical Exam  Vitals signs and nursing note reviewed.   Constitutional:       Appearance: She is well-developed.   Cardiovascular:      Rate and Rhythm: Normal rate and regular rhythm.      Pulses:           Dorsalis pedis pulses are 1+ on the right side and 1+ on the left side.        Posterior tibial pulses are 1+ on the right side and 1+ on the left side.      Heart sounds: Normal heart sounds.   Pulmonary:      Effort: Pulmonary effort is normal.      Breath sounds: Normal breath sounds.   Musculoskeletal: Normal range of motion.         General: Tenderness and deformity present.      Left foot: Deformity present.        Feet:    Feet:      Right foot:      Protective Sensation: 4 sites tested. 3 sites sensed.      Left foot:      Protective Sensation: 4 sites tested. 3 sites sensed.      Skin integrity: Ulcer, skin breakdown, erythema and callus present.   Skin:     General: Skin is warm.      Capillary Refill: Capillary refill takes more than 3 seconds.      Findings: Erythema present.   Neurological:      Mental Status: She is alert.   Psychiatric:         Behavior: Behavior normal.         Thought Content:  Thought content normal.         Judgment: Judgment normal.                     Assessment:       1. Type 1 diabetes mellitus with diabetic neuropathy    2. Skin ulcer of left foot with fat layer exposed        Plan:       Patient presents today for follow-up of an ulceration on the plantar surface of the patient's left hallux.   Non excisional debridement was performed on the area the ulceration is approximately 1.5 cm long by 0.4 cm wide by 3 mm deep.  Patient was recently in the hospital where I treated her for cellulitis of her left lower extremity her culture and sensitivity was positive for strep of the left foot.Previously noted cellulitis and edema in the left lower extremity is completely resolved at this time there is been no significant change in the wound itself underlying the plantar left hallux.  I did discuss at length and in detail with the patient treatment options for getting this wound completely closed and resolved I had wanted the patient to wear fracture boot she does not want to wear the fracture boot.  Patient will continue to apply this as directed plan follow-up will be 3 weeks.  Non excisional debridement was performed today.  Patient related that several days ago she thought that she had a callus on the top of her right great toe she states she started to pull at it and the tendon anchor that had been placed at the time of surgery had migrated to the dorsal aspect of the patient's left great toe she subsequently removed this from the area and since that time the area where she appeared to have an ingrown toenail has completely healed.  Patient had the anchor and is it block bag today and showed to me.  Patient was advised obviously the flexor tendon has reattached to the bone at its normal insertion point and the anchor was not functioning since she is still able to dorsiflex and plantar flex her left great toe.  Patient continues to show signs of improvement and will continue current  wound care plan I encouraged the patient to get a refill on her Regranex.  Patient advised it is very important to keep a well-padded dressing on the area keep it clean dry and change the dressing daily.  This note was created using [employer] voice recognition software that occasionally misinterpreted phrases or words.

## 2020-08-27 ENCOUNTER — OFFICE VISIT (OUTPATIENT)
Dept: PODIATRY | Facility: CLINIC | Age: 70
End: 2020-08-27
Payer: MEDICARE

## 2020-08-27 VITALS
DIASTOLIC BLOOD PRESSURE: 75 MMHG | OXYGEN SATURATION: 96 % | HEIGHT: 64 IN | SYSTOLIC BLOOD PRESSURE: 167 MMHG | TEMPERATURE: 98 F | WEIGHT: 266 LBS | RESPIRATION RATE: 14 BRPM | HEART RATE: 72 BPM | BODY MASS INDEX: 45.41 KG/M2

## 2020-08-27 DIAGNOSIS — L97.522 SKIN ULCER OF LEFT FOOT WITH FAT LAYER EXPOSED: Primary | ICD-10-CM

## 2020-08-27 DIAGNOSIS — E10.40 TYPE 1 DIABETES MELLITUS WITH DIABETIC NEUROPATHY: ICD-10-CM

## 2020-08-27 DIAGNOSIS — R26.81 UNSTEADY GAIT: ICD-10-CM

## 2020-08-27 PROCEDURE — 99213 PR OFFICE/OUTPT VISIT, EST, LEVL III, 20-29 MIN: ICD-10-PCS | Mod: S$GLB,,, | Performed by: PODIATRIST

## 2020-08-27 PROCEDURE — 99999 PR PBB SHADOW E&M-EST. PATIENT-LVL V: CPT | Mod: PBBFAC,,, | Performed by: PODIATRIST

## 2020-08-27 PROCEDURE — 3044F HG A1C LEVEL LT 7.0%: CPT | Mod: CPTII,S$GLB,, | Performed by: PODIATRIST

## 2020-08-27 PROCEDURE — 1159F PR MEDICATION LIST DOCUMENTED IN MEDICAL RECORD: ICD-10-PCS | Mod: S$GLB,,, | Performed by: PODIATRIST

## 2020-08-27 PROCEDURE — 1126F PR PAIN SEVERITY QUANTIFIED, NO PAIN PRESENT: ICD-10-PCS | Mod: S$GLB,,, | Performed by: PODIATRIST

## 2020-08-27 PROCEDURE — 99213 OFFICE O/P EST LOW 20 MIN: CPT | Mod: S$GLB,,, | Performed by: PODIATRIST

## 2020-08-27 PROCEDURE — 1101F PT FALLS ASSESS-DOCD LE1/YR: CPT | Mod: CPTII,S$GLB,, | Performed by: PODIATRIST

## 2020-08-27 PROCEDURE — 1159F MED LIST DOCD IN RCRD: CPT | Mod: S$GLB,,, | Performed by: PODIATRIST

## 2020-08-27 PROCEDURE — 3077F SYST BP >= 140 MM HG: CPT | Mod: CPTII,S$GLB,, | Performed by: PODIATRIST

## 2020-08-27 PROCEDURE — 3044F PR MOST RECENT HEMOGLOBIN A1C LEVEL <7.0%: ICD-10-PCS | Mod: CPTII,S$GLB,, | Performed by: PODIATRIST

## 2020-08-27 PROCEDURE — 3008F PR BODY MASS INDEX (BMI) DOCUMENTED: ICD-10-PCS | Mod: CPTII,S$GLB,, | Performed by: PODIATRIST

## 2020-08-27 PROCEDURE — 1126F AMNT PAIN NOTED NONE PRSNT: CPT | Mod: S$GLB,,, | Performed by: PODIATRIST

## 2020-08-27 PROCEDURE — 1101F PR PT FALLS ASSESS DOC 0-1 FALLS W/OUT INJ PAST YR: ICD-10-PCS | Mod: CPTII,S$GLB,, | Performed by: PODIATRIST

## 2020-08-27 PROCEDURE — 3078F DIAST BP <80 MM HG: CPT | Mod: CPTII,S$GLB,, | Performed by: PODIATRIST

## 2020-08-27 PROCEDURE — 3077F PR MOST RECENT SYSTOLIC BLOOD PRESSURE >= 140 MM HG: ICD-10-PCS | Mod: CPTII,S$GLB,, | Performed by: PODIATRIST

## 2020-08-27 PROCEDURE — 3078F PR MOST RECENT DIASTOLIC BLOOD PRESSURE < 80 MM HG: ICD-10-PCS | Mod: CPTII,S$GLB,, | Performed by: PODIATRIST

## 2020-08-27 PROCEDURE — 99999 PR PBB SHADOW E&M-EST. PATIENT-LVL V: ICD-10-PCS | Mod: PBBFAC,,, | Performed by: PODIATRIST

## 2020-08-27 PROCEDURE — 3008F BODY MASS INDEX DOCD: CPT | Mod: CPTII,S$GLB,, | Performed by: PODIATRIST

## 2020-08-30 NOTE — PROGRESS NOTES
Subjective:       Patient ID: Marifer Bernardo is a 70 y.o. female.    Chief Complaint: Follow-up   Patient presents follow-up wound care she was recently treated in the hospital for cellulitis of the left lower extremity.  Follow-up  Associated symptoms include arthralgias, joint swelling and numbness.   Foot Ulcer  Associated symptoms include arthralgias, joint swelling and numbness.   Foot Pain  Associated symptoms include arthralgias, joint swelling and numbness.   Nail Problem  Associated symptoms include arthralgias, joint swelling and numbness.      Review of Systems   Musculoskeletal: Positive for arthralgias, gait problem and joint swelling.   Neurological: Positive for numbness.   All other systems reviewed and are negative.      Objective:      Physical Exam  Vitals signs and nursing note reviewed.   Constitutional:       Appearance: She is well-developed.   Cardiovascular:      Rate and Rhythm: Normal rate and regular rhythm.      Pulses:           Dorsalis pedis pulses are 1+ on the right side and 1+ on the left side.        Posterior tibial pulses are 1+ on the right side and 1+ on the left side.      Heart sounds: Normal heart sounds.   Pulmonary:      Effort: Pulmonary effort is normal.      Breath sounds: Normal breath sounds.   Musculoskeletal: Normal range of motion.         General: Tenderness and deformity present.      Left foot: Deformity present.        Feet:    Feet:      Right foot:      Protective Sensation: 4 sites tested. 3 sites sensed.      Left foot:      Protective Sensation: 4 sites tested. 3 sites sensed.      Skin integrity: Ulcer, skin breakdown, erythema and callus present.   Skin:     General: Skin is warm.      Capillary Refill: Capillary refill takes more than 3 seconds.      Findings: Erythema present.   Neurological:      Mental Status: She is alert.   Psychiatric:         Behavior: Behavior normal.         Thought Content: Thought content normal.         Judgment: Judgment  normal.                             Assessment:       1. Skin ulcer of left foot with fat layer exposed    2. Type 1 diabetes mellitus with diabetic neuropathy    3. Unsteady gait        Plan:       Patient presents today for follow-up of an ulceration on the plantar surface of the patient's left hallux.   Non excisional debridement was performed on the area the ulceration is approximately 0.3 cm long by 0.2 cm wide by 1 mm deep.  Patient was recently in the hospital where I treated her for cellulitis of her left lower extremity her culture and sensitivity was positive for strep of the left foot.Previously noted cellulitis and edema in the left lower extremity is completely resolved at this time there is been no significant change in the wound itself underlying the plantar left hallux. Patient will continue to apply this as directed plan follow-up will be 2 weeks.  Non excisional debridement was performed today.  Patient is doing very well the wound is almost completely closed and resolved at this time I will allow the patient to get the area wet showering she has not been able to get her foot for an extended period of time however she must clean it immediately with Dakin solution rinse with saline and then reapplied the Regranex before she goes to bed.  Patient was in understanding and agreement with this follow-up 2 weeks any changes to the area patient is to contact us immediately.  Patient advised it is very important to keep a well-padded dressing on the area keep it clean dry and change the dressing daily.  This note was created using [employer] voice recognition software that occasionally misinterpreted phrases or words.

## 2020-09-10 ENCOUNTER — OFFICE VISIT (OUTPATIENT)
Dept: PODIATRY | Facility: CLINIC | Age: 70
End: 2020-09-10
Payer: MEDICARE

## 2020-09-10 VITALS
HEIGHT: 64 IN | SYSTOLIC BLOOD PRESSURE: 143 MMHG | HEART RATE: 81 BPM | WEIGHT: 260 LBS | BODY MASS INDEX: 44.39 KG/M2 | DIASTOLIC BLOOD PRESSURE: 78 MMHG | TEMPERATURE: 98 F

## 2020-09-10 DIAGNOSIS — L97.522 SKIN ULCER OF LEFT FOOT WITH FAT LAYER EXPOSED: ICD-10-CM

## 2020-09-10 DIAGNOSIS — E10.40 TYPE 1 DIABETES MELLITUS WITH DIABETIC NEUROPATHY: Primary | ICD-10-CM

## 2020-09-10 PROCEDURE — 1159F MED LIST DOCD IN RCRD: CPT | Mod: S$GLB,,, | Performed by: PODIATRIST

## 2020-09-10 PROCEDURE — 3078F DIAST BP <80 MM HG: CPT | Mod: CPTII,S$GLB,, | Performed by: PODIATRIST

## 2020-09-10 PROCEDURE — 3008F PR BODY MASS INDEX (BMI) DOCUMENTED: ICD-10-PCS | Mod: CPTII,S$GLB,, | Performed by: PODIATRIST

## 2020-09-10 PROCEDURE — 1125F PR PAIN SEVERITY QUANTIFIED, PAIN PRESENT: ICD-10-PCS | Mod: S$GLB,,, | Performed by: PODIATRIST

## 2020-09-10 PROCEDURE — 3044F HG A1C LEVEL LT 7.0%: CPT | Mod: CPTII,S$GLB,, | Performed by: PODIATRIST

## 2020-09-10 PROCEDURE — 3008F BODY MASS INDEX DOCD: CPT | Mod: CPTII,S$GLB,, | Performed by: PODIATRIST

## 2020-09-10 PROCEDURE — 1159F PR MEDICATION LIST DOCUMENTED IN MEDICAL RECORD: ICD-10-PCS | Mod: S$GLB,,, | Performed by: PODIATRIST

## 2020-09-10 PROCEDURE — 3078F PR MOST RECENT DIASTOLIC BLOOD PRESSURE < 80 MM HG: ICD-10-PCS | Mod: CPTII,S$GLB,, | Performed by: PODIATRIST

## 2020-09-10 PROCEDURE — 99213 PR OFFICE/OUTPT VISIT, EST, LEVL III, 20-29 MIN: ICD-10-PCS | Mod: S$GLB,,, | Performed by: PODIATRIST

## 2020-09-10 PROCEDURE — 1101F PT FALLS ASSESS-DOCD LE1/YR: CPT | Mod: CPTII,S$GLB,, | Performed by: PODIATRIST

## 2020-09-10 PROCEDURE — 3077F SYST BP >= 140 MM HG: CPT | Mod: CPTII,S$GLB,, | Performed by: PODIATRIST

## 2020-09-10 PROCEDURE — 99999 PR PBB SHADOW E&M-EST. PATIENT-LVL V: CPT | Mod: PBBFAC,,, | Performed by: PODIATRIST

## 2020-09-10 PROCEDURE — 3077F PR MOST RECENT SYSTOLIC BLOOD PRESSURE >= 140 MM HG: ICD-10-PCS | Mod: CPTII,S$GLB,, | Performed by: PODIATRIST

## 2020-09-10 PROCEDURE — 99999 PR PBB SHADOW E&M-EST. PATIENT-LVL V: ICD-10-PCS | Mod: PBBFAC,,, | Performed by: PODIATRIST

## 2020-09-10 PROCEDURE — 99213 OFFICE O/P EST LOW 20 MIN: CPT | Mod: S$GLB,,, | Performed by: PODIATRIST

## 2020-09-10 PROCEDURE — 3044F PR MOST RECENT HEMOGLOBIN A1C LEVEL <7.0%: ICD-10-PCS | Mod: CPTII,S$GLB,, | Performed by: PODIATRIST

## 2020-09-10 PROCEDURE — 1125F AMNT PAIN NOTED PAIN PRSNT: CPT | Mod: S$GLB,,, | Performed by: PODIATRIST

## 2020-09-10 PROCEDURE — 1101F PR PT FALLS ASSESS DOC 0-1 FALLS W/OUT INJ PAST YR: ICD-10-PCS | Mod: CPTII,S$GLB,, | Performed by: PODIATRIST

## 2020-09-10 RX ORDER — INSULIN ASPART 100 [IU]/ML
INJECTION, SUSPENSION SUBCUTANEOUS
COMMUNITY
End: 2021-03-05 | Stop reason: SDUPTHER

## 2020-09-13 NOTE — PROGRESS NOTES
Subjective:       Patient ID: Marifer Bernardo is a 70 y.o. female.    Chief Complaint: Follow-up, Diabetes Mellitus, Foot Ulcer, and Foot Problem   Patient presents follow-up wound care she was recently treated in the hospital for cellulitis of the left lower extremity.  Follow-up  Associated symptoms include arthralgias, joint swelling and numbness.   Foot Ulcer  Associated symptoms include arthralgias, joint swelling and numbness.   Foot Pain  Associated symptoms include arthralgias, joint swelling and numbness.   Nail Problem  Associated symptoms include arthralgias, joint swelling and numbness.      Review of Systems   Musculoskeletal: Positive for arthralgias, gait problem and joint swelling.   Neurological: Positive for numbness.   All other systems reviewed and are negative.      Objective:      Physical Exam  Vitals signs and nursing note reviewed.   Constitutional:       Appearance: She is well-developed.   Cardiovascular:      Rate and Rhythm: Normal rate and regular rhythm.      Pulses:           Dorsalis pedis pulses are 1+ on the right side and 1+ on the left side.        Posterior tibial pulses are 1+ on the right side and 1+ on the left side.      Heart sounds: Normal heart sounds.   Pulmonary:      Effort: Pulmonary effort is normal.      Breath sounds: Normal breath sounds.   Musculoskeletal: Normal range of motion.         General: Tenderness and deformity present.      Left foot: Deformity present.        Feet:    Feet:      Right foot:      Protective Sensation: 4 sites tested. 3 sites sensed.      Left foot:      Protective Sensation: 4 sites tested. 3 sites sensed.      Skin integrity: Ulcer, skin breakdown, erythema and callus present.   Skin:     General: Skin is warm.      Capillary Refill: Capillary refill takes more than 3 seconds.      Findings: Erythema present.   Neurological:      Mental Status: She is alert.   Psychiatric:         Behavior: Behavior normal.         Thought Content:  Thought content normal.         Judgment: Judgment normal.                             Assessment:       1. Type 1 diabetes mellitus with diabetic neuropathy    2. Skin ulcer of left foot with fat layer exposed        Plan:       Patient presents today for follow-up of an ulceration on the plantar surface of the patient's left hallux.   Non excisional debridement was performed on the area the ulceration is approximately 0.2 cm long by 0.1 cm wide by 1 mm deep.  Patient was advised that the area continues to look much better I anticipate this will be completely closed in the next 7-10 days patient has about 3-5 days of her grand X left she is going to go ahead and use this up I do want her to continue to clean the area with Dakin solution and keep a light dressing on the area especially with the new healthy pink skin and tissue that is formed we need to protect this area as it finishes healing.  Patient can go ahead and wear normal shoe but needs to monitor this area carefully I plan to follow up with her in 4 weeks if she has any problems questions or concerns prior to that time he is to contact me.  Face-to-face time equaled 15 min.  This note was created using [employer] voice recognition software that occasionally misinterpreted phrases or words.

## 2020-10-13 ENCOUNTER — OFFICE VISIT (OUTPATIENT)
Dept: PODIATRY | Facility: CLINIC | Age: 70
End: 2020-10-13
Payer: MEDICARE

## 2020-10-13 VITALS
SYSTOLIC BLOOD PRESSURE: 157 MMHG | BODY MASS INDEX: 44.39 KG/M2 | HEART RATE: 77 BPM | HEIGHT: 64 IN | WEIGHT: 260 LBS | DIASTOLIC BLOOD PRESSURE: 73 MMHG | TEMPERATURE: 97 F

## 2020-10-13 DIAGNOSIS — L97.522 SKIN ULCER OF LEFT FOOT WITH FAT LAYER EXPOSED: ICD-10-CM

## 2020-10-13 DIAGNOSIS — E10.40 TYPE 1 DIABETES MELLITUS WITH DIABETIC NEUROPATHY: Primary | ICD-10-CM

## 2020-10-13 DIAGNOSIS — R26.81 UNSTEADY GAIT: ICD-10-CM

## 2020-10-13 PROCEDURE — 3078F DIAST BP <80 MM HG: CPT | Mod: CPTII,S$GLB,, | Performed by: PODIATRIST

## 2020-10-13 PROCEDURE — 1126F PR PAIN SEVERITY QUANTIFIED, NO PAIN PRESENT: ICD-10-PCS | Mod: S$GLB,,, | Performed by: PODIATRIST

## 2020-10-13 PROCEDURE — 1159F PR MEDICATION LIST DOCUMENTED IN MEDICAL RECORD: ICD-10-PCS | Mod: S$GLB,,, | Performed by: PODIATRIST

## 2020-10-13 PROCEDURE — 3008F BODY MASS INDEX DOCD: CPT | Mod: CPTII,S$GLB,, | Performed by: PODIATRIST

## 2020-10-13 PROCEDURE — 3078F PR MOST RECENT DIASTOLIC BLOOD PRESSURE < 80 MM HG: ICD-10-PCS | Mod: CPTII,S$GLB,, | Performed by: PODIATRIST

## 2020-10-13 PROCEDURE — 3044F PR MOST RECENT HEMOGLOBIN A1C LEVEL <7.0%: ICD-10-PCS | Mod: CPTII,S$GLB,, | Performed by: PODIATRIST

## 2020-10-13 PROCEDURE — 3044F HG A1C LEVEL LT 7.0%: CPT | Mod: CPTII,S$GLB,, | Performed by: PODIATRIST

## 2020-10-13 PROCEDURE — 1159F MED LIST DOCD IN RCRD: CPT | Mod: S$GLB,,, | Performed by: PODIATRIST

## 2020-10-13 PROCEDURE — 3077F PR MOST RECENT SYSTOLIC BLOOD PRESSURE >= 140 MM HG: ICD-10-PCS | Mod: CPTII,S$GLB,, | Performed by: PODIATRIST

## 2020-10-13 PROCEDURE — 99999 PR PBB SHADOW E&M-EST. PATIENT-LVL IV: CPT | Mod: PBBFAC,,, | Performed by: PODIATRIST

## 2020-10-13 PROCEDURE — 1101F PT FALLS ASSESS-DOCD LE1/YR: CPT | Mod: CPTII,S$GLB,, | Performed by: PODIATRIST

## 2020-10-13 PROCEDURE — 3077F SYST BP >= 140 MM HG: CPT | Mod: CPTII,S$GLB,, | Performed by: PODIATRIST

## 2020-10-13 PROCEDURE — 1126F AMNT PAIN NOTED NONE PRSNT: CPT | Mod: S$GLB,,, | Performed by: PODIATRIST

## 2020-10-13 PROCEDURE — 3008F PR BODY MASS INDEX (BMI) DOCUMENTED: ICD-10-PCS | Mod: CPTII,S$GLB,, | Performed by: PODIATRIST

## 2020-10-13 PROCEDURE — 1101F PR PT FALLS ASSESS DOC 0-1 FALLS W/OUT INJ PAST YR: ICD-10-PCS | Mod: CPTII,S$GLB,, | Performed by: PODIATRIST

## 2020-10-13 PROCEDURE — 99213 PR OFFICE/OUTPT VISIT, EST, LEVL III, 20-29 MIN: ICD-10-PCS | Mod: S$GLB,,, | Performed by: PODIATRIST

## 2020-10-13 PROCEDURE — 99213 OFFICE O/P EST LOW 20 MIN: CPT | Mod: S$GLB,,, | Performed by: PODIATRIST

## 2020-10-13 PROCEDURE — 99999 PR PBB SHADOW E&M-EST. PATIENT-LVL IV: ICD-10-PCS | Mod: PBBFAC,,, | Performed by: PODIATRIST

## 2020-10-13 RX ORDER — DICLOFENAC SODIUM 75 MG/1
TABLET, DELAYED RELEASE ORAL
COMMUNITY
End: 2021-02-17

## 2020-10-13 RX ORDER — GABAPENTIN 400 MG/1
CAPSULE ORAL
COMMUNITY
End: 2021-06-28 | Stop reason: SDUPTHER

## 2020-10-13 RX ORDER — QUINAPRIL HYDROCHLORIDE AND HYDROCHLOROTHIAZIDE 20; 12.5 MG/1; MG/1
TABLET, FILM COATED ORAL
COMMUNITY
End: 2021-02-22 | Stop reason: SDUPTHER

## 2020-10-13 RX ORDER — GABAPENTIN 100 MG/1
CAPSULE ORAL
COMMUNITY
End: 2021-06-28 | Stop reason: SDUPTHER

## 2020-10-17 NOTE — PROGRESS NOTES
Subjective:       Patient ID: Marifer Bernardo is a 70 y.o. female.    Chief Complaint: Follow-up, Foot Ulcer, and Diabetes Mellitus   Patient presents follow-up wound care and diabetic evaluation.  Follow-up  Associated symptoms include arthralgias, joint swelling and numbness.   Foot Ulcer  Associated symptoms include arthralgias, joint swelling and numbness.   Foot Pain  Associated symptoms include arthralgias, joint swelling and numbness.   Nail Problem  Associated symptoms include arthralgias, joint swelling and numbness.      Review of Systems   Musculoskeletal: Positive for arthralgias, gait problem and joint swelling.   Neurological: Positive for numbness.   All other systems reviewed and are negative.      Objective:      Physical Exam  Vitals signs and nursing note reviewed.   Constitutional:       Appearance: She is well-developed.   Cardiovascular:      Rate and Rhythm: Normal rate and regular rhythm.      Pulses:           Dorsalis pedis pulses are 1+ on the right side and 1+ on the left side.        Posterior tibial pulses are 1+ on the right side and 1+ on the left side.      Heart sounds: Normal heart sounds.   Pulmonary:      Effort: Pulmonary effort is normal.      Breath sounds: Normal breath sounds.   Musculoskeletal: Normal range of motion.         General: Tenderness and deformity present.      Left foot: Deformity present.        Feet:    Feet:      Right foot:      Protective Sensation: 4 sites tested. 3 sites sensed.      Left foot:      Protective Sensation: 4 sites tested. 3 sites sensed.      Skin integrity: Ulcer, skin breakdown, erythema and callus present.   Skin:     General: Skin is warm.      Capillary Refill: Capillary refill takes more than 3 seconds.      Findings: Erythema present.   Neurological:      Mental Status: She is alert.   Psychiatric:         Behavior: Behavior normal.         Thought Content: Thought content normal.         Judgment: Judgment normal.                              Assessment:       1. Type 1 diabetes mellitus with diabetic neuropathy    2. Unsteady gait    3. Skin ulcer of left foot with fat layer exposed        Plan:       Patient presents today for follow-up of an ulceration on the plantar surface of the patient's left hallux.   Non excisional debridement was performed on the area the ulceration.  The ulceration site remains completely closed and healed with no open area or wound currently noted.  Diabetic evaluation performed.  Recommended follow-up every 4-6 months unless the patient has any problems questions or concerns I did recommend application of cocoa butter with vitamin E to the area to keep it well hydrated I do recommend she just keeps a light Band-Aid over the area to prevent rubbing irritation and breakdown certainly because she has had a history of chronic ulcer here it is at risk for breakdown.  Face-to-face time equaled 15 min.  This note was created using [employer] voice recognition software that occasionally misinterpreted phrases or words.

## 2020-12-28 ENCOUNTER — TELEPHONE (OUTPATIENT)
Dept: FAMILY MEDICINE | Facility: CLINIC | Age: 70
End: 2020-12-28

## 2020-12-28 DIAGNOSIS — Z79.4 TYPE 2 DIABETES MELLITUS WITHOUT COMPLICATION, WITH LONG-TERM CURRENT USE OF INSULIN: Primary | ICD-10-CM

## 2020-12-28 DIAGNOSIS — E11.9 TYPE 2 DIABETES MELLITUS WITHOUT COMPLICATION, WITH LONG-TERM CURRENT USE OF INSULIN: Primary | ICD-10-CM

## 2020-12-28 RX ORDER — INSULIN ASPART 100 [IU]/ML
INJECTION, SUSPENSION SUBCUTANEOUS
Qty: 120 ML | Refills: 0 | Status: SHIPPED | OUTPATIENT
Start: 2020-12-28 | End: 2021-03-05 | Stop reason: SDUPTHER

## 2020-12-28 RX ORDER — INSULIN ASPART 100 [IU]/ML
INJECTION, SUSPENSION SUBCUTANEOUS
Qty: 120 ML | Refills: 0 | Status: SHIPPED | OUTPATIENT
Start: 2020-12-28 | End: 2020-12-28

## 2020-12-28 NOTE — TELEPHONE ENCOUNTER
----- Message from Jannet Canada sent at 12/28/2020  9:38 AM CST -----  Contact: Patient @ [phone]  Requesting an RX refill or new RX.  Is this a refill or new RX:   RX name and strength: insulin aspart protamine-insulin aspart (NOVOLOG MIX 70-30FLEXPEN U-100) 100 unit/mL (70-30) InPn pen  Is this a 30 day or 90 day RX:   Pharmacy name and phone # [facility] - [place], [place] - [address]  Comments:

## 2021-01-11 ENCOUNTER — OFFICE VISIT (OUTPATIENT)
Dept: PODIATRY | Facility: CLINIC | Age: 71
End: 2021-01-11
Payer: MEDICARE

## 2021-01-11 ENCOUNTER — TELEPHONE (OUTPATIENT)
Dept: PODIATRY | Facility: CLINIC | Age: 71
End: 2021-01-11

## 2021-01-11 VITALS
HEART RATE: 77 BPM | WEIGHT: 265 LBS | DIASTOLIC BLOOD PRESSURE: 86 MMHG | SYSTOLIC BLOOD PRESSURE: 170 MMHG | TEMPERATURE: 98 F | BODY MASS INDEX: 45.49 KG/M2

## 2021-01-11 DIAGNOSIS — E10.40 TYPE 1 DIABETES MELLITUS WITH DIABETIC NEUROPATHY: Primary | ICD-10-CM

## 2021-01-11 DIAGNOSIS — L97.522 SKIN ULCER OF LEFT FOOT WITH FAT LAYER EXPOSED: ICD-10-CM

## 2021-01-11 PROCEDURE — 3077F SYST BP >= 140 MM HG: CPT | Mod: CPTII,S$GLB,, | Performed by: PODIATRIST

## 2021-01-11 PROCEDURE — 3079F PR MOST RECENT DIASTOLIC BLOOD PRESSURE 80-89 MM HG: ICD-10-PCS | Mod: CPTII,S$GLB,, | Performed by: PODIATRIST

## 2021-01-11 PROCEDURE — 1159F PR MEDICATION LIST DOCUMENTED IN MEDICAL RECORD: ICD-10-PCS | Mod: S$GLB,,, | Performed by: PODIATRIST

## 2021-01-11 PROCEDURE — 3008F PR BODY MASS INDEX (BMI) DOCUMENTED: ICD-10-PCS | Mod: CPTII,S$GLB,, | Performed by: PODIATRIST

## 2021-01-11 PROCEDURE — 99213 OFFICE O/P EST LOW 20 MIN: CPT | Mod: 25,S$GLB,, | Performed by: PODIATRIST

## 2021-01-11 PROCEDURE — 1159F MED LIST DOCD IN RCRD: CPT | Mod: S$GLB,,, | Performed by: PODIATRIST

## 2021-01-11 PROCEDURE — 3008F BODY MASS INDEX DOCD: CPT | Mod: CPTII,S$GLB,, | Performed by: PODIATRIST

## 2021-01-11 PROCEDURE — 3044F HG A1C LEVEL LT 7.0%: CPT | Mod: CPTII,S$GLB,, | Performed by: PODIATRIST

## 2021-01-11 PROCEDURE — 99999 PR PBB SHADOW E&M-EST. PATIENT-LVL V: ICD-10-PCS | Mod: PBBFAC,,, | Performed by: PODIATRIST

## 2021-01-11 PROCEDURE — 97597 DBRDMT OPN WND 1ST 20 CM/<: CPT | Mod: S$GLB,,, | Performed by: PODIATRIST

## 2021-01-11 PROCEDURE — 99999 PR PBB SHADOW E&M-EST. PATIENT-LVL V: CPT | Mod: PBBFAC,,, | Performed by: PODIATRIST

## 2021-01-11 PROCEDURE — 3044F PR MOST RECENT HEMOGLOBIN A1C LEVEL <7.0%: ICD-10-PCS | Mod: CPTII,S$GLB,, | Performed by: PODIATRIST

## 2021-01-11 PROCEDURE — 99213 PR OFFICE/OUTPT VISIT, EST, LEVL III, 20-29 MIN: ICD-10-PCS | Mod: 25,S$GLB,, | Performed by: PODIATRIST

## 2021-01-11 PROCEDURE — 97597 WOUND DEBRIDEMENT: ICD-10-PCS | Mod: S$GLB,,, | Performed by: PODIATRIST

## 2021-01-11 PROCEDURE — 3079F DIAST BP 80-89 MM HG: CPT | Mod: CPTII,S$GLB,, | Performed by: PODIATRIST

## 2021-01-11 PROCEDURE — 1126F PR PAIN SEVERITY QUANTIFIED, NO PAIN PRESENT: ICD-10-PCS | Mod: S$GLB,,, | Performed by: PODIATRIST

## 2021-01-11 PROCEDURE — 1126F AMNT PAIN NOTED NONE PRSNT: CPT | Mod: S$GLB,,, | Performed by: PODIATRIST

## 2021-01-11 PROCEDURE — 3077F PR MOST RECENT SYSTOLIC BLOOD PRESSURE >= 140 MM HG: ICD-10-PCS | Mod: CPTII,S$GLB,, | Performed by: PODIATRIST

## 2021-01-11 RX ORDER — INFLUENZA A VIRUS A/MICHIGAN/45/2015 X-275 (H1N1) ANTIGEN (FORMALDEHYDE INACTIVATED), INFLUENZA A VIRUS A/SINGAPORE/INFIMH-16-0019/2016 IVR-186 (H3N2) ANTIGEN (FORMALDEHYDE INACTIVATED), INFLUENZA B VIRUS B/PHUKET/3073/2013 ANTIGEN (FORMALDEHYDE INACTIVATED), AND INFLUENZA B VIRUS B/MARYLAND/15/2016 BX-69A ANTIGEN (FORMALDEHYDE INACTIVATED) 60; 60; 60; 60 UG/.7ML; UG/.7ML; UG/.7ML; UG/.7ML
INJECTION, SUSPENSION INTRAMUSCULAR
COMMUNITY
Start: 2021-01-11 | End: 2021-01-26

## 2021-01-11 RX ORDER — TRIAMCINOLONE ACETONIDE 1 MG/G
CREAM TOPICAL
COMMUNITY
Start: 2020-12-01 | End: 2022-10-24

## 2021-01-11 RX ORDER — SULFAMETHOXAZOLE AND TRIMETHOPRIM 400; 80 MG/1; MG/1
2 TABLET ORAL 2 TIMES DAILY
Qty: 56 TABLET | Refills: 0 | Status: SHIPPED | OUTPATIENT
Start: 2021-01-11 | End: 2021-01-25

## 2021-01-26 ENCOUNTER — OFFICE VISIT (OUTPATIENT)
Dept: PODIATRY | Facility: CLINIC | Age: 71
End: 2021-01-26
Payer: MEDICARE

## 2021-01-26 VITALS
BODY MASS INDEX: 44.05 KG/M2 | HEART RATE: 77 BPM | TEMPERATURE: 98 F | DIASTOLIC BLOOD PRESSURE: 78 MMHG | WEIGHT: 258 LBS | SYSTOLIC BLOOD PRESSURE: 147 MMHG | HEIGHT: 64 IN

## 2021-01-26 DIAGNOSIS — M19.072 OSTEOARTHRITIS OF LEFT ANKLE AND FOOT: ICD-10-CM

## 2021-01-26 DIAGNOSIS — R26.81 UNSTEADY GAIT: ICD-10-CM

## 2021-01-26 DIAGNOSIS — E10.40 TYPE 1 DIABETES MELLITUS WITH DIABETIC NEUROPATHY: Primary | ICD-10-CM

## 2021-01-26 DIAGNOSIS — L97.522 SKIN ULCER OF LEFT FOOT WITH FAT LAYER EXPOSED: ICD-10-CM

## 2021-01-26 PROCEDURE — 1126F AMNT PAIN NOTED NONE PRSNT: CPT | Mod: S$GLB,,, | Performed by: PODIATRIST

## 2021-01-26 PROCEDURE — 1159F MED LIST DOCD IN RCRD: CPT | Mod: S$GLB,,, | Performed by: PODIATRIST

## 2021-01-26 PROCEDURE — 99999 PR PBB SHADOW E&M-EST. PATIENT-LVL IV: ICD-10-PCS | Mod: PBBFAC,,, | Performed by: PODIATRIST

## 2021-01-26 PROCEDURE — 3044F PR MOST RECENT HEMOGLOBIN A1C LEVEL <7.0%: ICD-10-PCS | Mod: CPTII,S$GLB,, | Performed by: PODIATRIST

## 2021-01-26 PROCEDURE — 3008F PR BODY MASS INDEX (BMI) DOCUMENTED: ICD-10-PCS | Mod: CPTII,S$GLB,, | Performed by: PODIATRIST

## 2021-01-26 PROCEDURE — 3008F BODY MASS INDEX DOCD: CPT | Mod: CPTII,S$GLB,, | Performed by: PODIATRIST

## 2021-01-26 PROCEDURE — 3077F SYST BP >= 140 MM HG: CPT | Mod: CPTII,S$GLB,, | Performed by: PODIATRIST

## 2021-01-26 PROCEDURE — 3078F PR MOST RECENT DIASTOLIC BLOOD PRESSURE < 80 MM HG: ICD-10-PCS | Mod: CPTII,S$GLB,, | Performed by: PODIATRIST

## 2021-01-26 PROCEDURE — 3044F HG A1C LEVEL LT 7.0%: CPT | Mod: CPTII,S$GLB,, | Performed by: PODIATRIST

## 2021-01-26 PROCEDURE — 1159F PR MEDICATION LIST DOCUMENTED IN MEDICAL RECORD: ICD-10-PCS | Mod: S$GLB,,, | Performed by: PODIATRIST

## 2021-01-26 PROCEDURE — 1126F PR PAIN SEVERITY QUANTIFIED, NO PAIN PRESENT: ICD-10-PCS | Mod: S$GLB,,, | Performed by: PODIATRIST

## 2021-01-26 PROCEDURE — 99213 OFFICE O/P EST LOW 20 MIN: CPT | Mod: 25,S$GLB,, | Performed by: PODIATRIST

## 2021-01-26 PROCEDURE — 99999 PR PBB SHADOW E&M-EST. PATIENT-LVL IV: CPT | Mod: PBBFAC,,, | Performed by: PODIATRIST

## 2021-01-26 PROCEDURE — 3078F DIAST BP <80 MM HG: CPT | Mod: CPTII,S$GLB,, | Performed by: PODIATRIST

## 2021-01-26 PROCEDURE — 11042 WOUND DEBRIDEMENT: ICD-10-PCS | Mod: S$GLB,,, | Performed by: PODIATRIST

## 2021-01-26 PROCEDURE — 3077F PR MOST RECENT SYSTOLIC BLOOD PRESSURE >= 140 MM HG: ICD-10-PCS | Mod: CPTII,S$GLB,, | Performed by: PODIATRIST

## 2021-01-26 PROCEDURE — 99213 PR OFFICE/OUTPT VISIT, EST, LEVL III, 20-29 MIN: ICD-10-PCS | Mod: 25,S$GLB,, | Performed by: PODIATRIST

## 2021-01-26 PROCEDURE — 11042 DBRDMT SUBQ TIS 1ST 20SQCM/<: CPT | Mod: S$GLB,,, | Performed by: PODIATRIST

## 2021-01-26 RX ORDER — LIRAGLUTIDE 6 MG/ML
INJECTION SUBCUTANEOUS
COMMUNITY
End: 2021-03-05 | Stop reason: SDUPTHER

## 2021-01-30 PROBLEM — R22.42 MASS OF LEFT FOOT: Status: RESOLVED | Noted: 2020-02-04 | Resolved: 2021-01-30

## 2021-02-09 ENCOUNTER — OFFICE VISIT (OUTPATIENT)
Dept: PODIATRY | Facility: CLINIC | Age: 71
End: 2021-02-09
Payer: MEDICARE

## 2021-02-09 VITALS
SYSTOLIC BLOOD PRESSURE: 145 MMHG | HEART RATE: 83 BPM | WEIGHT: 255 LBS | BODY MASS INDEX: 43.54 KG/M2 | TEMPERATURE: 98 F | HEIGHT: 64 IN | DIASTOLIC BLOOD PRESSURE: 72 MMHG | RESPIRATION RATE: 18 BRPM

## 2021-02-09 DIAGNOSIS — L97.522 SKIN ULCER OF LEFT FOOT WITH FAT LAYER EXPOSED: Primary | ICD-10-CM

## 2021-02-09 DIAGNOSIS — R26.81 UNSTEADY GAIT: ICD-10-CM

## 2021-02-09 DIAGNOSIS — E10.40 TYPE 1 DIABETES MELLITUS WITH DIABETIC NEUROPATHY: ICD-10-CM

## 2021-02-09 PROCEDURE — 3078F DIAST BP <80 MM HG: CPT | Mod: CPTII,S$GLB,, | Performed by: PODIATRIST

## 2021-02-09 PROCEDURE — 99213 PR OFFICE/OUTPT VISIT, EST, LEVL III, 20-29 MIN: ICD-10-PCS | Mod: S$GLB,,, | Performed by: PODIATRIST

## 2021-02-09 PROCEDURE — 3044F PR MOST RECENT HEMOGLOBIN A1C LEVEL <7.0%: ICD-10-PCS | Mod: CPTII,S$GLB,, | Performed by: PODIATRIST

## 2021-02-09 PROCEDURE — 3078F PR MOST RECENT DIASTOLIC BLOOD PRESSURE < 80 MM HG: ICD-10-PCS | Mod: CPTII,S$GLB,, | Performed by: PODIATRIST

## 2021-02-09 PROCEDURE — 99999 PR PBB SHADOW E&M-EST. PATIENT-LVL IV: CPT | Mod: PBBFAC,,, | Performed by: PODIATRIST

## 2021-02-09 PROCEDURE — 1159F MED LIST DOCD IN RCRD: CPT | Mod: S$GLB,,, | Performed by: PODIATRIST

## 2021-02-09 PROCEDURE — 3044F HG A1C LEVEL LT 7.0%: CPT | Mod: CPTII,S$GLB,, | Performed by: PODIATRIST

## 2021-02-09 PROCEDURE — 1126F AMNT PAIN NOTED NONE PRSNT: CPT | Mod: S$GLB,,, | Performed by: PODIATRIST

## 2021-02-09 PROCEDURE — 3077F SYST BP >= 140 MM HG: CPT | Mod: CPTII,S$GLB,, | Performed by: PODIATRIST

## 2021-02-09 PROCEDURE — 3008F PR BODY MASS INDEX (BMI) DOCUMENTED: ICD-10-PCS | Mod: CPTII,S$GLB,, | Performed by: PODIATRIST

## 2021-02-09 PROCEDURE — 3077F PR MOST RECENT SYSTOLIC BLOOD PRESSURE >= 140 MM HG: ICD-10-PCS | Mod: CPTII,S$GLB,, | Performed by: PODIATRIST

## 2021-02-09 PROCEDURE — 1126F PR PAIN SEVERITY QUANTIFIED, NO PAIN PRESENT: ICD-10-PCS | Mod: S$GLB,,, | Performed by: PODIATRIST

## 2021-02-09 PROCEDURE — 1159F PR MEDICATION LIST DOCUMENTED IN MEDICAL RECORD: ICD-10-PCS | Mod: S$GLB,,, | Performed by: PODIATRIST

## 2021-02-09 PROCEDURE — 99213 OFFICE O/P EST LOW 20 MIN: CPT | Mod: S$GLB,,, | Performed by: PODIATRIST

## 2021-02-09 PROCEDURE — 3008F BODY MASS INDEX DOCD: CPT | Mod: CPTII,S$GLB,, | Performed by: PODIATRIST

## 2021-02-09 PROCEDURE — 99999 PR PBB SHADOW E&M-EST. PATIENT-LVL IV: ICD-10-PCS | Mod: PBBFAC,,, | Performed by: PODIATRIST

## 2021-02-12 PROBLEM — S96.912S: Status: RESOLVED | Noted: 2020-02-04 | Resolved: 2021-02-12

## 2021-02-22 DIAGNOSIS — I10 ESSENTIAL HYPERTENSION: Primary | ICD-10-CM

## 2021-02-22 RX ORDER — QUINAPRIL HYDROCHLORIDE AND HYDROCHLOROTHIAZIDE 20; 12.5 MG/1; MG/1
1 TABLET, FILM COATED ORAL DAILY
Qty: 90 TABLET | Refills: 3 | OUTPATIENT
Start: 2021-02-22

## 2021-02-22 RX ORDER — QUINAPRIL HYDROCHLORIDE AND HYDROCHLOROTHIAZIDE 20; 12.5 MG/1; MG/1
1 TABLET, FILM COATED ORAL DAILY
Qty: 90 TABLET | Refills: 0 | Status: SHIPPED | OUTPATIENT
Start: 2021-02-22 | End: 2021-03-05 | Stop reason: SDUPTHER

## 2021-03-02 ENCOUNTER — OFFICE VISIT (OUTPATIENT)
Dept: PODIATRY | Facility: CLINIC | Age: 71
End: 2021-03-02
Payer: MEDICARE

## 2021-03-02 VITALS
TEMPERATURE: 98 F | WEIGHT: 255 LBS | DIASTOLIC BLOOD PRESSURE: 82 MMHG | HEIGHT: 64 IN | HEART RATE: 88 BPM | SYSTOLIC BLOOD PRESSURE: 148 MMHG | BODY MASS INDEX: 43.54 KG/M2

## 2021-03-02 DIAGNOSIS — L97.529 ULCER OF LEFT FOOT DUE TO TYPE 2 DIABETES MELLITUS: ICD-10-CM

## 2021-03-02 DIAGNOSIS — L97.522 SKIN ULCER OF LEFT GREAT TOE WITH FAT LAYER EXPOSED: ICD-10-CM

## 2021-03-02 DIAGNOSIS — L97.522 SKIN ULCER OF LEFT FOOT WITH FAT LAYER EXPOSED: Primary | ICD-10-CM

## 2021-03-02 DIAGNOSIS — E11.621 ULCER OF LEFT FOOT DUE TO TYPE 2 DIABETES MELLITUS: ICD-10-CM

## 2021-03-02 DIAGNOSIS — E11.42 DIABETIC POLYNEUROPATHY ASSOCIATED WITH TYPE 2 DIABETES MELLITUS: ICD-10-CM

## 2021-03-02 PROCEDURE — 99999 PR PBB SHADOW E&M-EST. PATIENT-LVL IV: ICD-10-PCS | Mod: PBBFAC,,, | Performed by: PODIATRIST

## 2021-03-02 PROCEDURE — 3077F SYST BP >= 140 MM HG: CPT | Mod: CPTII,S$GLB,, | Performed by: PODIATRIST

## 2021-03-02 PROCEDURE — 3044F HG A1C LEVEL LT 7.0%: CPT | Mod: CPTII,S$GLB,, | Performed by: PODIATRIST

## 2021-03-02 PROCEDURE — 99214 PR OFFICE/OUTPT VISIT, EST, LEVL IV, 30-39 MIN: ICD-10-PCS | Mod: S$GLB,,, | Performed by: PODIATRIST

## 2021-03-02 PROCEDURE — 1159F MED LIST DOCD IN RCRD: CPT | Mod: S$GLB,,, | Performed by: PODIATRIST

## 2021-03-02 PROCEDURE — 99999 PR PBB SHADOW E&M-EST. PATIENT-LVL IV: CPT | Mod: PBBFAC,,, | Performed by: PODIATRIST

## 2021-03-02 PROCEDURE — 3008F BODY MASS INDEX DOCD: CPT | Mod: CPTII,S$GLB,, | Performed by: PODIATRIST

## 2021-03-02 PROCEDURE — 3044F PR MOST RECENT HEMOGLOBIN A1C LEVEL <7.0%: ICD-10-PCS | Mod: CPTII,S$GLB,, | Performed by: PODIATRIST

## 2021-03-02 PROCEDURE — 99214 OFFICE O/P EST MOD 30 MIN: CPT | Mod: S$GLB,,, | Performed by: PODIATRIST

## 2021-03-02 PROCEDURE — 3079F PR MOST RECENT DIASTOLIC BLOOD PRESSURE 80-89 MM HG: ICD-10-PCS | Mod: CPTII,S$GLB,, | Performed by: PODIATRIST

## 2021-03-02 PROCEDURE — 1125F AMNT PAIN NOTED PAIN PRSNT: CPT | Mod: S$GLB,,, | Performed by: PODIATRIST

## 2021-03-02 PROCEDURE — 3008F PR BODY MASS INDEX (BMI) DOCUMENTED: ICD-10-PCS | Mod: CPTII,S$GLB,, | Performed by: PODIATRIST

## 2021-03-02 PROCEDURE — 3077F PR MOST RECENT SYSTOLIC BLOOD PRESSURE >= 140 MM HG: ICD-10-PCS | Mod: CPTII,S$GLB,, | Performed by: PODIATRIST

## 2021-03-02 PROCEDURE — 1125F PR PAIN SEVERITY QUANTIFIED, PAIN PRESENT: ICD-10-PCS | Mod: S$GLB,,, | Performed by: PODIATRIST

## 2021-03-02 PROCEDURE — 3079F DIAST BP 80-89 MM HG: CPT | Mod: CPTII,S$GLB,, | Performed by: PODIATRIST

## 2021-03-02 PROCEDURE — 1159F PR MEDICATION LIST DOCUMENTED IN MEDICAL RECORD: ICD-10-PCS | Mod: S$GLB,,, | Performed by: PODIATRIST

## 2021-03-05 ENCOUNTER — OFFICE VISIT (OUTPATIENT)
Dept: FAMILY MEDICINE | Facility: CLINIC | Age: 71
End: 2021-03-05
Payer: MEDICARE

## 2021-03-05 ENCOUNTER — LAB VISIT (OUTPATIENT)
Dept: LAB | Facility: CLINIC | Age: 71
End: 2021-03-05
Payer: MEDICARE

## 2021-03-05 VITALS
TEMPERATURE: 99 F | SYSTOLIC BLOOD PRESSURE: 132 MMHG | BODY MASS INDEX: 44.15 KG/M2 | HEART RATE: 75 BPM | HEIGHT: 64 IN | DIASTOLIC BLOOD PRESSURE: 68 MMHG | OXYGEN SATURATION: 97 % | WEIGHT: 258.63 LBS

## 2021-03-05 DIAGNOSIS — E10.40 TYPE 1 DIABETES MELLITUS WITH DIABETIC NEUROPATHY: Primary | ICD-10-CM

## 2021-03-05 DIAGNOSIS — L97.522 SKIN ULCER OF LEFT FOOT WITH FAT LAYER EXPOSED: ICD-10-CM

## 2021-03-05 DIAGNOSIS — E11.9 TYPE 2 DIABETES MELLITUS WITHOUT COMPLICATION, WITH LONG-TERM CURRENT USE OF INSULIN: ICD-10-CM

## 2021-03-05 DIAGNOSIS — E78.2 MIXED HYPERLIPIDEMIA: ICD-10-CM

## 2021-03-05 DIAGNOSIS — E66.01 MORBID OBESITY: ICD-10-CM

## 2021-03-05 DIAGNOSIS — Z79.4 LONG TERM CURRENT USE OF INSULIN: ICD-10-CM

## 2021-03-05 DIAGNOSIS — R26.81 UNSTEADY GAIT: ICD-10-CM

## 2021-03-05 DIAGNOSIS — E10.40 TYPE 1 DIABETES MELLITUS WITH DIABETIC NEUROPATHY: ICD-10-CM

## 2021-03-05 DIAGNOSIS — I10 ESSENTIAL HYPERTENSION: ICD-10-CM

## 2021-03-05 DIAGNOSIS — Z79.4 TYPE 2 DIABETES MELLITUS WITHOUT COMPLICATION, WITH LONG-TERM CURRENT USE OF INSULIN: ICD-10-CM

## 2021-03-05 DIAGNOSIS — L03.116 CELLULITIS OF LEFT LOWER EXTREMITY: ICD-10-CM

## 2021-03-05 PROBLEM — L97.509 ULCER OF GREAT TOE: Status: ACTIVE | Noted: 2020-06-05

## 2021-03-05 PROBLEM — E11.621 ULCER OF LEFT FOOT DUE TO TYPE 2 DIABETES MELLITUS: Status: ACTIVE | Noted: 2020-07-17

## 2021-03-05 PROBLEM — E11.42 DIABETIC POLYNEUROPATHY: Status: ACTIVE | Noted: 2018-07-16

## 2021-03-05 PROBLEM — M54.16 LUMBAR RADICULOPATHY: Status: ACTIVE | Noted: 2017-10-26

## 2021-03-05 PROBLEM — L97.529 ULCER OF LEFT FOOT DUE TO TYPE 2 DIABETES MELLITUS: Status: ACTIVE | Noted: 2020-07-17

## 2021-03-05 LAB — GLUCOSE SERPL-MCNC: 95 MG/DL (ref 70–110)

## 2021-03-05 PROCEDURE — 3078F DIAST BP <80 MM HG: CPT | Mod: CPTII,S$GLB,, | Performed by: NURSE PRACTITIONER

## 2021-03-05 PROCEDURE — 3008F PR BODY MASS INDEX (BMI) DOCUMENTED: ICD-10-PCS | Mod: CPTII,S$GLB,, | Performed by: NURSE PRACTITIONER

## 2021-03-05 PROCEDURE — 99213 OFFICE O/P EST LOW 20 MIN: CPT | Mod: S$GLB,,, | Performed by: NURSE PRACTITIONER

## 2021-03-05 PROCEDURE — 82962 POCT GLUCOSE, HAND-HELD DEVICE: ICD-10-PCS | Mod: ,,, | Performed by: NURSE PRACTITIONER

## 2021-03-05 PROCEDURE — 83036 HEMOGLOBIN GLYCOSYLATED A1C: CPT | Performed by: NURSE PRACTITIONER

## 2021-03-05 PROCEDURE — 3075F PR MOST RECENT SYSTOLIC BLOOD PRESS GE 130-139MM HG: ICD-10-PCS | Mod: CPTII,S$GLB,, | Performed by: NURSE PRACTITIONER

## 2021-03-05 PROCEDURE — 1101F PR PT FALLS ASSESS DOC 0-1 FALLS W/OUT INJ PAST YR: ICD-10-PCS | Mod: CPTII,S$GLB,, | Performed by: NURSE PRACTITIONER

## 2021-03-05 PROCEDURE — 99213 PR OFFICE/OUTPT VISIT, EST, LEVL III, 20-29 MIN: ICD-10-PCS | Mod: S$GLB,,, | Performed by: NURSE PRACTITIONER

## 2021-03-05 PROCEDURE — 36415 PR COLLECTION VENOUS BLOOD,VENIPUNCTURE: ICD-10-PCS | Mod: ,,, | Performed by: NURSE PRACTITIONER

## 2021-03-05 PROCEDURE — 82962 GLUCOSE BLOOD TEST: CPT | Mod: ,,, | Performed by: NURSE PRACTITIONER

## 2021-03-05 PROCEDURE — 3075F SYST BP GE 130 - 139MM HG: CPT | Mod: CPTII,S$GLB,, | Performed by: NURSE PRACTITIONER

## 2021-03-05 PROCEDURE — 1159F PR MEDICATION LIST DOCUMENTED IN MEDICAL RECORD: ICD-10-PCS | Mod: S$GLB,,, | Performed by: NURSE PRACTITIONER

## 2021-03-05 PROCEDURE — 1101F PT FALLS ASSESS-DOCD LE1/YR: CPT | Mod: CPTII,S$GLB,, | Performed by: NURSE PRACTITIONER

## 2021-03-05 PROCEDURE — 3078F PR MOST RECENT DIASTOLIC BLOOD PRESSURE < 80 MM HG: ICD-10-PCS | Mod: CPTII,S$GLB,, | Performed by: NURSE PRACTITIONER

## 2021-03-05 PROCEDURE — 3288F FALL RISK ASSESSMENT DOCD: CPT | Mod: CPTII,S$GLB,, | Performed by: NURSE PRACTITIONER

## 2021-03-05 PROCEDURE — 3044F HG A1C LEVEL LT 7.0%: CPT | Mod: CPTII,S$GLB,, | Performed by: NURSE PRACTITIONER

## 2021-03-05 PROCEDURE — 3288F PR FALLS RISK ASSESSMENT DOCUMENTED: ICD-10-PCS | Mod: CPTII,S$GLB,, | Performed by: NURSE PRACTITIONER

## 2021-03-05 PROCEDURE — 36415 COLL VENOUS BLD VENIPUNCTURE: CPT | Mod: ,,, | Performed by: NURSE PRACTITIONER

## 2021-03-05 PROCEDURE — 1159F MED LIST DOCD IN RCRD: CPT | Mod: S$GLB,,, | Performed by: NURSE PRACTITIONER

## 2021-03-05 PROCEDURE — 3008F BODY MASS INDEX DOCD: CPT | Mod: CPTII,S$GLB,, | Performed by: NURSE PRACTITIONER

## 2021-03-05 PROCEDURE — 3044F PR MOST RECENT HEMOGLOBIN A1C LEVEL <7.0%: ICD-10-PCS | Mod: CPTII,S$GLB,, | Performed by: NURSE PRACTITIONER

## 2021-03-05 RX ORDER — SULFAMETHOXAZOLE AND TRIMETHOPRIM 800; 160 MG/1; MG/1
1 TABLET ORAL 2 TIMES DAILY
Qty: 20 TABLET | Refills: 0 | Status: SHIPPED | OUTPATIENT
Start: 2021-03-05 | End: 2021-03-15

## 2021-03-05 RX ORDER — SIMVASTATIN 20 MG/1
20 TABLET, FILM COATED ORAL NIGHTLY
Qty: 90 TABLET | Refills: 3 | Status: SHIPPED | OUTPATIENT
Start: 2021-03-05 | End: 2023-04-10 | Stop reason: SDUPTHER

## 2021-03-05 RX ORDER — INSULIN ASPART 100 [IU]/ML
INJECTION, SUSPENSION SUBCUTANEOUS
Qty: 120 ML | Refills: 5 | Status: SHIPPED | OUTPATIENT
Start: 2021-03-05 | End: 2021-09-17 | Stop reason: SDUPTHER

## 2021-03-05 RX ORDER — OMEGA-3-ACID ETHYL ESTERS 1 G/1
2 CAPSULE, LIQUID FILLED ORAL DAILY
Qty: 180 CAPSULE | Refills: 3 | Status: SHIPPED | OUTPATIENT
Start: 2021-03-05 | End: 2021-12-16

## 2021-03-05 RX ORDER — QUINAPRIL HYDROCHLORIDE AND HYDROCHLOROTHIAZIDE 20; 12.5 MG/1; MG/1
1 TABLET, FILM COATED ORAL DAILY
Qty: 90 TABLET | Refills: 3 | Status: SHIPPED | OUTPATIENT
Start: 2021-03-05 | End: 2022-02-25

## 2021-03-05 RX ORDER — DAPAGLIFLOZIN AND METFORMIN HYDROCHLORIDE 5; 1000 MG/1; MG/1
1 TABLET, FILM COATED, EXTENDED RELEASE ORAL 2 TIMES DAILY
Qty: 180 TABLET | Refills: 3 | Status: SHIPPED | OUTPATIENT
Start: 2021-03-05 | End: 2021-06-03

## 2021-03-05 RX ORDER — FENOFIBRATE 145 MG/1
145 TABLET, FILM COATED ORAL DAILY
Qty: 90 TABLET | Refills: 3 | Status: SHIPPED | OUTPATIENT
Start: 2021-03-05 | End: 2021-12-16

## 2021-03-05 RX ORDER — LIRAGLUTIDE 6 MG/ML
0.6 INJECTION SUBCUTANEOUS DAILY
Qty: 9 ML | Refills: 3 | Status: SHIPPED | OUTPATIENT
Start: 2021-03-05 | End: 2021-03-15 | Stop reason: CLARIF

## 2021-03-06 LAB
ESTIMATED AVG GLUCOSE: 126 MG/DL (ref 68–131)
HBA1C MFR BLD: 6 % (ref 4–5.6)

## 2021-03-08 DIAGNOSIS — E11.9 TYPE 2 DIABETES MELLITUS WITHOUT COMPLICATION, WITH LONG-TERM CURRENT USE OF INSULIN: ICD-10-CM

## 2021-03-08 DIAGNOSIS — Z79.4 TYPE 2 DIABETES MELLITUS WITHOUT COMPLICATION, WITH LONG-TERM CURRENT USE OF INSULIN: ICD-10-CM

## 2021-03-08 RX ORDER — INSULIN ASPART 100 [IU]/ML
INJECTION, SUSPENSION SUBCUTANEOUS
Qty: 120 ML | Refills: 5 | OUTPATIENT
Start: 2021-03-08

## 2021-03-15 ENCOUNTER — TELEPHONE (OUTPATIENT)
Dept: FAMILY MEDICINE | Facility: CLINIC | Age: 71
End: 2021-03-15

## 2021-03-15 DIAGNOSIS — E11.42 DIABETIC POLYNEUROPATHY ASSOCIATED WITH TYPE 2 DIABETES MELLITUS: Primary | ICD-10-CM

## 2021-03-15 RX ORDER — LIRAGLUTIDE 6 MG/ML
1.8 INJECTION SUBCUTANEOUS DAILY
Qty: 27 ML | Refills: 3 | Status: SHIPPED | OUTPATIENT
Start: 2021-03-15 | End: 2021-03-24 | Stop reason: SDUPTHER

## 2021-03-23 ENCOUNTER — OFFICE VISIT (OUTPATIENT)
Dept: PODIATRY | Facility: CLINIC | Age: 71
End: 2021-03-23
Payer: MEDICARE

## 2021-03-23 VITALS
HEIGHT: 64 IN | BODY MASS INDEX: 43.54 KG/M2 | RESPIRATION RATE: 18 BRPM | SYSTOLIC BLOOD PRESSURE: 147 MMHG | TEMPERATURE: 97 F | DIASTOLIC BLOOD PRESSURE: 77 MMHG | HEART RATE: 78 BPM | WEIGHT: 255 LBS

## 2021-03-23 DIAGNOSIS — R26.81 UNSTEADY GAIT: ICD-10-CM

## 2021-03-23 DIAGNOSIS — E11.621 ULCER OF LEFT FOOT DUE TO TYPE 2 DIABETES MELLITUS: Primary | ICD-10-CM

## 2021-03-23 DIAGNOSIS — L03.116 CELLULITIS OF LEFT LEG: ICD-10-CM

## 2021-03-23 DIAGNOSIS — L97.522 SKIN ULCER OF LEFT GREAT TOE WITH FAT LAYER EXPOSED: ICD-10-CM

## 2021-03-23 DIAGNOSIS — L97.529 ULCER OF LEFT FOOT DUE TO TYPE 2 DIABETES MELLITUS: Primary | ICD-10-CM

## 2021-03-23 PROCEDURE — 99999 PR PBB SHADOW E&M-EST. PATIENT-LVL IV: ICD-10-PCS | Mod: PBBFAC,,, | Performed by: PODIATRIST

## 2021-03-23 PROCEDURE — 99999 PR PBB SHADOW E&M-EST. PATIENT-LVL IV: CPT | Mod: PBBFAC,,, | Performed by: PODIATRIST

## 2021-03-23 PROCEDURE — 99214 OFFICE O/P EST MOD 30 MIN: CPT | Mod: S$GLB,,, | Performed by: PODIATRIST

## 2021-03-23 PROCEDURE — 1125F AMNT PAIN NOTED PAIN PRSNT: CPT | Mod: S$GLB,,, | Performed by: PODIATRIST

## 2021-03-23 PROCEDURE — 3078F DIAST BP <80 MM HG: CPT | Mod: CPTII,S$GLB,, | Performed by: PODIATRIST

## 2021-03-23 PROCEDURE — 1125F PR PAIN SEVERITY QUANTIFIED, PAIN PRESENT: ICD-10-PCS | Mod: S$GLB,,, | Performed by: PODIATRIST

## 2021-03-23 PROCEDURE — 1159F PR MEDICATION LIST DOCUMENTED IN MEDICAL RECORD: ICD-10-PCS | Mod: S$GLB,,, | Performed by: PODIATRIST

## 2021-03-23 PROCEDURE — 3077F SYST BP >= 140 MM HG: CPT | Mod: CPTII,S$GLB,, | Performed by: PODIATRIST

## 2021-03-23 PROCEDURE — 3008F PR BODY MASS INDEX (BMI) DOCUMENTED: ICD-10-PCS | Mod: CPTII,S$GLB,, | Performed by: PODIATRIST

## 2021-03-23 PROCEDURE — 3044F PR MOST RECENT HEMOGLOBIN A1C LEVEL <7.0%: ICD-10-PCS | Mod: CPTII,S$GLB,, | Performed by: PODIATRIST

## 2021-03-23 PROCEDURE — 3077F PR MOST RECENT SYSTOLIC BLOOD PRESSURE >= 140 MM HG: ICD-10-PCS | Mod: CPTII,S$GLB,, | Performed by: PODIATRIST

## 2021-03-23 PROCEDURE — 99214 PR OFFICE/OUTPT VISIT, EST, LEVL IV, 30-39 MIN: ICD-10-PCS | Mod: S$GLB,,, | Performed by: PODIATRIST

## 2021-03-23 PROCEDURE — 3044F HG A1C LEVEL LT 7.0%: CPT | Mod: CPTII,S$GLB,, | Performed by: PODIATRIST

## 2021-03-23 PROCEDURE — 3008F BODY MASS INDEX DOCD: CPT | Mod: CPTII,S$GLB,, | Performed by: PODIATRIST

## 2021-03-23 PROCEDURE — 3078F PR MOST RECENT DIASTOLIC BLOOD PRESSURE < 80 MM HG: ICD-10-PCS | Mod: CPTII,S$GLB,, | Performed by: PODIATRIST

## 2021-03-23 PROCEDURE — 1159F MED LIST DOCD IN RCRD: CPT | Mod: S$GLB,,, | Performed by: PODIATRIST

## 2021-03-24 DIAGNOSIS — E11.42 DIABETIC POLYNEUROPATHY ASSOCIATED WITH TYPE 2 DIABETES MELLITUS: ICD-10-CM

## 2021-03-24 RX ORDER — LIRAGLUTIDE 6 MG/ML
1.8 INJECTION SUBCUTANEOUS DAILY
Qty: 27 ML | Refills: 3 | Status: SHIPPED | OUTPATIENT
Start: 2021-03-24 | End: 2021-09-17 | Stop reason: SDUPTHER

## 2021-03-25 ENCOUNTER — TELEPHONE (OUTPATIENT)
Dept: PODIATRY | Facility: CLINIC | Age: 71
End: 2021-03-25

## 2021-03-25 RX ORDER — SULFAMETHOXAZOLE AND TRIMETHOPRIM 400; 80 MG/1; MG/1
2 TABLET ORAL 2 TIMES DAILY
Qty: 56 TABLET | Refills: 0 | Status: SHIPPED | OUTPATIENT
Start: 2021-03-25 | End: 2021-04-08

## 2021-04-20 ENCOUNTER — OFFICE VISIT (OUTPATIENT)
Dept: PODIATRY | Facility: CLINIC | Age: 71
End: 2021-04-20
Payer: MEDICARE

## 2021-04-20 VITALS
BODY MASS INDEX: 43.54 KG/M2 | HEART RATE: 74 BPM | SYSTOLIC BLOOD PRESSURE: 152 MMHG | TEMPERATURE: 98 F | WEIGHT: 255 LBS | DIASTOLIC BLOOD PRESSURE: 70 MMHG | HEIGHT: 64 IN

## 2021-04-20 DIAGNOSIS — E11.49 TYPE II DIABETES MELLITUS WITH NEUROLOGICAL MANIFESTATIONS: ICD-10-CM

## 2021-04-20 DIAGNOSIS — L97.522 SKIN ULCER OF LEFT GREAT TOE WITH FAT LAYER EXPOSED: ICD-10-CM

## 2021-04-20 DIAGNOSIS — L97.522 SKIN ULCER OF LEFT FOOT WITH FAT LAYER EXPOSED: Primary | ICD-10-CM

## 2021-04-20 PROCEDURE — 1159F MED LIST DOCD IN RCRD: CPT | Mod: S$GLB,,, | Performed by: PODIATRIST

## 2021-04-20 PROCEDURE — 3044F HG A1C LEVEL LT 7.0%: CPT | Mod: CPTII,S$GLB,, | Performed by: PODIATRIST

## 2021-04-20 PROCEDURE — 99213 OFFICE O/P EST LOW 20 MIN: CPT | Mod: S$GLB,,, | Performed by: PODIATRIST

## 2021-04-20 PROCEDURE — 99999 PR PBB SHADOW E&M-EST. PATIENT-LVL IV: ICD-10-PCS | Mod: PBBFAC,,, | Performed by: PODIATRIST

## 2021-04-20 PROCEDURE — 1125F AMNT PAIN NOTED PAIN PRSNT: CPT | Mod: S$GLB,,, | Performed by: PODIATRIST

## 2021-04-20 PROCEDURE — 1159F PR MEDICATION LIST DOCUMENTED IN MEDICAL RECORD: ICD-10-PCS | Mod: S$GLB,,, | Performed by: PODIATRIST

## 2021-04-20 PROCEDURE — 99213 PR OFFICE/OUTPT VISIT, EST, LEVL III, 20-29 MIN: ICD-10-PCS | Mod: S$GLB,,, | Performed by: PODIATRIST

## 2021-04-20 PROCEDURE — 3044F PR MOST RECENT HEMOGLOBIN A1C LEVEL <7.0%: ICD-10-PCS | Mod: CPTII,S$GLB,, | Performed by: PODIATRIST

## 2021-04-20 PROCEDURE — 3008F PR BODY MASS INDEX (BMI) DOCUMENTED: ICD-10-PCS | Mod: CPTII,S$GLB,, | Performed by: PODIATRIST

## 2021-04-20 PROCEDURE — 1125F PR PAIN SEVERITY QUANTIFIED, PAIN PRESENT: ICD-10-PCS | Mod: S$GLB,,, | Performed by: PODIATRIST

## 2021-04-20 PROCEDURE — 3008F BODY MASS INDEX DOCD: CPT | Mod: CPTII,S$GLB,, | Performed by: PODIATRIST

## 2021-04-20 PROCEDURE — 99999 PR PBB SHADOW E&M-EST. PATIENT-LVL IV: CPT | Mod: PBBFAC,,, | Performed by: PODIATRIST

## 2021-04-24 PROBLEM — E11.49 TYPE II DIABETES MELLITUS WITH NEUROLOGICAL MANIFESTATIONS: Status: ACTIVE | Noted: 2021-04-24

## 2021-05-20 ENCOUNTER — OFFICE VISIT (OUTPATIENT)
Dept: PODIATRY | Facility: CLINIC | Age: 71
End: 2021-05-20
Payer: MEDICARE

## 2021-05-20 VITALS
DIASTOLIC BLOOD PRESSURE: 79 MMHG | SYSTOLIC BLOOD PRESSURE: 162 MMHG | BODY MASS INDEX: 44.05 KG/M2 | HEIGHT: 64 IN | WEIGHT: 258 LBS | HEART RATE: 84 BPM | RESPIRATION RATE: 18 BRPM

## 2021-05-20 DIAGNOSIS — E11.621 ULCER OF LEFT FOOT DUE TO TYPE 2 DIABETES MELLITUS: ICD-10-CM

## 2021-05-20 DIAGNOSIS — R26.81 UNSTEADY GAIT: ICD-10-CM

## 2021-05-20 DIAGNOSIS — L97.529 ULCER OF LEFT FOOT DUE TO TYPE 2 DIABETES MELLITUS: ICD-10-CM

## 2021-05-20 DIAGNOSIS — L97.522 SKIN ULCER OF LEFT FOOT WITH FAT LAYER EXPOSED: ICD-10-CM

## 2021-05-20 DIAGNOSIS — E11.49 TYPE II DIABETES MELLITUS WITH NEUROLOGICAL MANIFESTATIONS: Primary | ICD-10-CM

## 2021-05-20 PROCEDURE — 3044F PR MOST RECENT HEMOGLOBIN A1C LEVEL <7.0%: ICD-10-PCS | Mod: CPTII,S$GLB,, | Performed by: PODIATRIST

## 2021-05-20 PROCEDURE — 99999 PR PBB SHADOW E&M-EST. PATIENT-LVL IV: ICD-10-PCS | Mod: PBBFAC,,, | Performed by: PODIATRIST

## 2021-05-20 PROCEDURE — 1159F PR MEDICATION LIST DOCUMENTED IN MEDICAL RECORD: ICD-10-PCS | Mod: S$GLB,,, | Performed by: PODIATRIST

## 2021-05-20 PROCEDURE — 3044F HG A1C LEVEL LT 7.0%: CPT | Mod: CPTII,S$GLB,, | Performed by: PODIATRIST

## 2021-05-20 PROCEDURE — 99999 PR PBB SHADOW E&M-EST. PATIENT-LVL IV: CPT | Mod: PBBFAC,,, | Performed by: PODIATRIST

## 2021-05-20 PROCEDURE — 1126F PR PAIN SEVERITY QUANTIFIED, NO PAIN PRESENT: ICD-10-PCS | Mod: S$GLB,,, | Performed by: PODIATRIST

## 2021-05-20 PROCEDURE — 1126F AMNT PAIN NOTED NONE PRSNT: CPT | Mod: S$GLB,,, | Performed by: PODIATRIST

## 2021-05-20 PROCEDURE — 3008F PR BODY MASS INDEX (BMI) DOCUMENTED: ICD-10-PCS | Mod: CPTII,S$GLB,, | Performed by: PODIATRIST

## 2021-05-20 PROCEDURE — 99213 PR OFFICE/OUTPT VISIT, EST, LEVL III, 20-29 MIN: ICD-10-PCS | Mod: S$GLB,,, | Performed by: PODIATRIST

## 2021-05-20 PROCEDURE — 3008F BODY MASS INDEX DOCD: CPT | Mod: CPTII,S$GLB,, | Performed by: PODIATRIST

## 2021-05-20 PROCEDURE — 1159F MED LIST DOCD IN RCRD: CPT | Mod: S$GLB,,, | Performed by: PODIATRIST

## 2021-05-20 PROCEDURE — 99213 OFFICE O/P EST LOW 20 MIN: CPT | Mod: S$GLB,,, | Performed by: PODIATRIST

## 2021-06-28 ENCOUNTER — TELEPHONE (OUTPATIENT)
Dept: PODIATRY | Facility: CLINIC | Age: 71
End: 2021-06-28

## 2021-06-28 RX ORDER — GABAPENTIN 100 MG/1
100 CAPSULE ORAL DAILY
Qty: 90 CAPSULE | Refills: 3 | Status: SHIPPED | OUTPATIENT
Start: 2021-06-28 | End: 2022-05-23 | Stop reason: SDUPTHER

## 2021-06-28 RX ORDER — GABAPENTIN 400 MG/1
400 CAPSULE ORAL NIGHTLY
Qty: 90 CAPSULE | Refills: 3 | Status: SHIPPED | OUTPATIENT
Start: 2021-06-28 | End: 2021-09-26

## 2021-07-06 ENCOUNTER — TELEPHONE (OUTPATIENT)
Dept: FAMILY MEDICINE | Facility: CLINIC | Age: 71
End: 2021-07-06

## 2021-07-06 DIAGNOSIS — E11.49 TYPE II DIABETES MELLITUS WITH NEUROLOGICAL MANIFESTATIONS: Primary | ICD-10-CM

## 2021-07-06 RX ORDER — PEN NEEDLE, DIABETIC 30 GX3/16"
1 NEEDLE, DISPOSABLE MISCELLANEOUS 2 TIMES DAILY
Qty: 100 EACH | Refills: 3 | Status: SHIPPED | OUTPATIENT
Start: 2021-07-06 | End: 2021-11-19

## 2021-07-21 ENCOUNTER — OFFICE VISIT (OUTPATIENT)
Dept: PODIATRY | Facility: CLINIC | Age: 71
End: 2021-07-21
Payer: MEDICARE

## 2021-07-21 VITALS
HEART RATE: 85 BPM | DIASTOLIC BLOOD PRESSURE: 85 MMHG | HEIGHT: 64 IN | BODY MASS INDEX: 44.05 KG/M2 | WEIGHT: 258 LBS | TEMPERATURE: 98 F | SYSTOLIC BLOOD PRESSURE: 145 MMHG

## 2021-07-21 DIAGNOSIS — L60.0 INGROWN NAIL: Primary | ICD-10-CM

## 2021-07-21 DIAGNOSIS — L97.521 SKIN ULCER OF LEFT GREAT TOE, LIMITED TO BREAKDOWN OF SKIN: ICD-10-CM

## 2021-07-21 DIAGNOSIS — L97.529 ULCER OF LEFT FOOT DUE TO TYPE 2 DIABETES MELLITUS: ICD-10-CM

## 2021-07-21 DIAGNOSIS — E11.49 TYPE II DIABETES MELLITUS WITH NEUROLOGICAL MANIFESTATIONS: ICD-10-CM

## 2021-07-21 DIAGNOSIS — E11.621 ULCER OF LEFT FOOT DUE TO TYPE 2 DIABETES MELLITUS: ICD-10-CM

## 2021-07-21 PROCEDURE — 99214 PR OFFICE/OUTPT VISIT, EST, LEVL IV, 30-39 MIN: ICD-10-PCS | Mod: S$GLB,,, | Performed by: PODIATRIST

## 2021-07-21 PROCEDURE — 1125F PR PAIN SEVERITY QUANTIFIED, PAIN PRESENT: ICD-10-PCS | Mod: CPTII,S$GLB,, | Performed by: PODIATRIST

## 2021-07-21 PROCEDURE — 3077F SYST BP >= 140 MM HG: CPT | Mod: CPTII,S$GLB,, | Performed by: PODIATRIST

## 2021-07-21 PROCEDURE — 1159F PR MEDICATION LIST DOCUMENTED IN MEDICAL RECORD: ICD-10-PCS | Mod: CPTII,S$GLB,, | Performed by: PODIATRIST

## 2021-07-21 PROCEDURE — 3079F PR MOST RECENT DIASTOLIC BLOOD PRESSURE 80-89 MM HG: ICD-10-PCS | Mod: CPTII,S$GLB,, | Performed by: PODIATRIST

## 2021-07-21 PROCEDURE — 99999 PR PBB SHADOW E&M-EST. PATIENT-LVL V: ICD-10-PCS | Mod: PBBFAC,,, | Performed by: PODIATRIST

## 2021-07-21 PROCEDURE — 99214 OFFICE O/P EST MOD 30 MIN: CPT | Mod: S$GLB,,, | Performed by: PODIATRIST

## 2021-07-21 PROCEDURE — 3008F BODY MASS INDEX DOCD: CPT | Mod: CPTII,S$GLB,, | Performed by: PODIATRIST

## 2021-07-21 PROCEDURE — 99999 PR PBB SHADOW E&M-EST. PATIENT-LVL V: CPT | Mod: PBBFAC,,, | Performed by: PODIATRIST

## 2021-07-21 PROCEDURE — 3077F PR MOST RECENT SYSTOLIC BLOOD PRESSURE >= 140 MM HG: ICD-10-PCS | Mod: CPTII,S$GLB,, | Performed by: PODIATRIST

## 2021-07-21 PROCEDURE — 1125F AMNT PAIN NOTED PAIN PRSNT: CPT | Mod: CPTII,S$GLB,, | Performed by: PODIATRIST

## 2021-07-21 PROCEDURE — 3044F HG A1C LEVEL LT 7.0%: CPT | Mod: CPTII,S$GLB,, | Performed by: PODIATRIST

## 2021-07-21 PROCEDURE — 3044F PR MOST RECENT HEMOGLOBIN A1C LEVEL <7.0%: ICD-10-PCS | Mod: CPTII,S$GLB,, | Performed by: PODIATRIST

## 2021-07-21 PROCEDURE — 1159F MED LIST DOCD IN RCRD: CPT | Mod: CPTII,S$GLB,, | Performed by: PODIATRIST

## 2021-07-21 PROCEDURE — 3008F PR BODY MASS INDEX (BMI) DOCUMENTED: ICD-10-PCS | Mod: CPTII,S$GLB,, | Performed by: PODIATRIST

## 2021-07-21 PROCEDURE — 3079F DIAST BP 80-89 MM HG: CPT | Mod: CPTII,S$GLB,, | Performed by: PODIATRIST

## 2021-07-21 RX ORDER — SULFAMETHOXAZOLE AND TRIMETHOPRIM 800; 160 MG/1; MG/1
1 TABLET ORAL 2 TIMES DAILY
Qty: 28 TABLET | Refills: 0 | Status: SHIPPED | OUTPATIENT
Start: 2021-07-21 | End: 2021-08-04

## 2021-08-03 ENCOUNTER — OFFICE VISIT (OUTPATIENT)
Dept: PODIATRY | Facility: CLINIC | Age: 71
End: 2021-08-03
Payer: MEDICARE

## 2021-08-03 VITALS
BODY MASS INDEX: 43.02 KG/M2 | WEIGHT: 252 LBS | SYSTOLIC BLOOD PRESSURE: 132 MMHG | HEART RATE: 77 BPM | TEMPERATURE: 98 F | HEIGHT: 64 IN | DIASTOLIC BLOOD PRESSURE: 75 MMHG

## 2021-08-03 DIAGNOSIS — L60.0 INGROWN NAIL: ICD-10-CM

## 2021-08-03 DIAGNOSIS — L97.521 SKIN ULCER OF LEFT GREAT TOE, LIMITED TO BREAKDOWN OF SKIN: ICD-10-CM

## 2021-08-03 DIAGNOSIS — E11.49 TYPE II DIABETES MELLITUS WITH NEUROLOGICAL MANIFESTATIONS: Primary | ICD-10-CM

## 2021-08-03 PROCEDURE — 99213 OFFICE O/P EST LOW 20 MIN: CPT | Mod: S$GLB,,, | Performed by: PODIATRIST

## 2021-08-03 PROCEDURE — 1125F PR PAIN SEVERITY QUANTIFIED, PAIN PRESENT: ICD-10-PCS | Mod: CPTII,S$GLB,, | Performed by: PODIATRIST

## 2021-08-03 PROCEDURE — 3078F DIAST BP <80 MM HG: CPT | Mod: CPTII,S$GLB,, | Performed by: PODIATRIST

## 2021-08-03 PROCEDURE — 1159F MED LIST DOCD IN RCRD: CPT | Mod: CPTII,S$GLB,, | Performed by: PODIATRIST

## 2021-08-03 PROCEDURE — 3075F SYST BP GE 130 - 139MM HG: CPT | Mod: CPTII,S$GLB,, | Performed by: PODIATRIST

## 2021-08-03 PROCEDURE — 3044F PR MOST RECENT HEMOGLOBIN A1C LEVEL <7.0%: ICD-10-PCS | Mod: CPTII,S$GLB,, | Performed by: PODIATRIST

## 2021-08-03 PROCEDURE — 3044F HG A1C LEVEL LT 7.0%: CPT | Mod: CPTII,S$GLB,, | Performed by: PODIATRIST

## 2021-08-03 PROCEDURE — 1160F RVW MEDS BY RX/DR IN RCRD: CPT | Mod: CPTII,S$GLB,, | Performed by: PODIATRIST

## 2021-08-03 PROCEDURE — 3008F PR BODY MASS INDEX (BMI) DOCUMENTED: ICD-10-PCS | Mod: CPTII,S$GLB,, | Performed by: PODIATRIST

## 2021-08-03 PROCEDURE — 3078F PR MOST RECENT DIASTOLIC BLOOD PRESSURE < 80 MM HG: ICD-10-PCS | Mod: CPTII,S$GLB,, | Performed by: PODIATRIST

## 2021-08-03 PROCEDURE — 1159F PR MEDICATION LIST DOCUMENTED IN MEDICAL RECORD: ICD-10-PCS | Mod: CPTII,S$GLB,, | Performed by: PODIATRIST

## 2021-08-03 PROCEDURE — 99999 PR PBB SHADOW E&M-EST. PATIENT-LVL V: ICD-10-PCS | Mod: PBBFAC,,, | Performed by: PODIATRIST

## 2021-08-03 PROCEDURE — 1125F AMNT PAIN NOTED PAIN PRSNT: CPT | Mod: CPTII,S$GLB,, | Performed by: PODIATRIST

## 2021-08-03 PROCEDURE — 99213 PR OFFICE/OUTPT VISIT, EST, LEVL III, 20-29 MIN: ICD-10-PCS | Mod: S$GLB,,, | Performed by: PODIATRIST

## 2021-08-03 PROCEDURE — 3008F BODY MASS INDEX DOCD: CPT | Mod: CPTII,S$GLB,, | Performed by: PODIATRIST

## 2021-08-03 PROCEDURE — 1160F PR REVIEW ALL MEDS BY PRESCRIBER/CLIN PHARMACIST DOCUMENTED: ICD-10-PCS | Mod: CPTII,S$GLB,, | Performed by: PODIATRIST

## 2021-08-03 PROCEDURE — 99999 PR PBB SHADOW E&M-EST. PATIENT-LVL V: CPT | Mod: PBBFAC,,, | Performed by: PODIATRIST

## 2021-08-03 PROCEDURE — 3075F PR MOST RECENT SYSTOLIC BLOOD PRESS GE 130-139MM HG: ICD-10-PCS | Mod: CPTII,S$GLB,, | Performed by: PODIATRIST

## 2021-08-03 RX ORDER — DAPAGLIFLOZIN AND METFORMIN HYDROCHLORIDE 5; 1000 MG/1; MG/1
1 TABLET, FILM COATED, EXTENDED RELEASE ORAL 2 TIMES DAILY
COMMUNITY
Start: 2021-07-20 | End: 2021-09-20 | Stop reason: SDUPTHER

## 2021-09-01 DIAGNOSIS — E11.42 DIABETIC POLYNEUROPATHY ASSOCIATED WITH TYPE 2 DIABETES MELLITUS: Primary | ICD-10-CM

## 2021-09-01 RX ORDER — DICLOFENAC SODIUM 75 MG/1
75 TABLET, DELAYED RELEASE ORAL 2 TIMES DAILY
OUTPATIENT
Start: 2021-09-01

## 2021-09-03 ENCOUNTER — TELEPHONE (OUTPATIENT)
Dept: FAMILY MEDICINE | Facility: CLINIC | Age: 71
End: 2021-09-03

## 2021-09-08 ENCOUNTER — PATIENT OUTREACH (OUTPATIENT)
Dept: ADMINISTRATIVE | Facility: HOSPITAL | Age: 71
End: 2021-09-08

## 2021-09-17 ENCOUNTER — OFFICE VISIT (OUTPATIENT)
Dept: FAMILY MEDICINE | Facility: CLINIC | Age: 71
End: 2021-09-17
Payer: MEDICARE

## 2021-09-17 ENCOUNTER — LAB VISIT (OUTPATIENT)
Dept: LAB | Facility: CLINIC | Age: 71
End: 2021-09-17
Payer: MEDICARE

## 2021-09-17 VITALS
HEART RATE: 72 BPM | TEMPERATURE: 98 F | HEIGHT: 64 IN | SYSTOLIC BLOOD PRESSURE: 126 MMHG | WEIGHT: 256.38 LBS | DIASTOLIC BLOOD PRESSURE: 88 MMHG | RESPIRATION RATE: 16 BRPM | BODY MASS INDEX: 43.77 KG/M2 | OXYGEN SATURATION: 95 %

## 2021-09-17 DIAGNOSIS — R30.0 DYSURIA: ICD-10-CM

## 2021-09-17 DIAGNOSIS — Z23 ENCOUNTER FOR IMMUNIZATION: ICD-10-CM

## 2021-09-17 DIAGNOSIS — E11.42 DIABETIC POLYNEUROPATHY ASSOCIATED WITH TYPE 2 DIABETES MELLITUS: ICD-10-CM

## 2021-09-17 DIAGNOSIS — N30.01 ACUTE HEMORRHAGIC CYSTITIS: ICD-10-CM

## 2021-09-17 DIAGNOSIS — F32.A DEPRESSIVE DISORDER: ICD-10-CM

## 2021-09-17 DIAGNOSIS — Z79.4 TYPE 2 DIABETES MELLITUS WITH COMPLICATION, WITH LONG-TERM CURRENT USE OF INSULIN: ICD-10-CM

## 2021-09-17 DIAGNOSIS — M17.11 PRIMARY OSTEOARTHRITIS OF RIGHT KNEE: ICD-10-CM

## 2021-09-17 DIAGNOSIS — E11.8 TYPE 2 DIABETES MELLITUS WITH COMPLICATION, WITH LONG-TERM CURRENT USE OF INSULIN: ICD-10-CM

## 2021-09-17 DIAGNOSIS — Z13.31 POSITIVE DEPRESSION SCREENING: Primary | ICD-10-CM

## 2021-09-17 LAB
BILIRUB SERPL-MCNC: NEGATIVE MG/DL
BLOOD URINE, POC: ABNORMAL
COLOR, POC UA: YELLOW
GLUCOSE UR QL STRIP: 500
KETONES UR QL STRIP: ABNORMAL
LEUKOCYTE ESTERASE URINE, POC: ABNORMAL
NITRITE, POC UA: POSITIVE
PH, POC UA: 5.5
PROTEIN, POC: NEGATIVE
SPECIFIC GRAVITY, POC UA: 1.02
UROBILINOGEN, POC UA: 0.2

## 2021-09-17 PROCEDURE — 4010F ACE/ARB THERAPY RXD/TAKEN: CPT | Mod: CPTII,S$GLB,, | Performed by: NURSE PRACTITIONER

## 2021-09-17 PROCEDURE — 1126F AMNT PAIN NOTED NONE PRSNT: CPT | Mod: CPTII,S$GLB,, | Performed by: NURSE PRACTITIONER

## 2021-09-17 PROCEDURE — 36415 PR COLLECTION VENOUS BLOOD,VENIPUNCTURE: ICD-10-PCS | Mod: ,,, | Performed by: NURSE PRACTITIONER

## 2021-09-17 PROCEDURE — 83036 HEMOGLOBIN GLYCOSYLATED A1C: CPT | Performed by: NURSE PRACTITIONER

## 2021-09-17 PROCEDURE — 3079F PR MOST RECENT DIASTOLIC BLOOD PRESSURE 80-89 MM HG: ICD-10-PCS | Mod: CPTII,S$GLB,, | Performed by: NURSE PRACTITIONER

## 2021-09-17 PROCEDURE — 1101F PR PT FALLS ASSESS DOC 0-1 FALLS W/OUT INJ PAST YR: ICD-10-PCS | Mod: CPTII,S$GLB,, | Performed by: NURSE PRACTITIONER

## 2021-09-17 PROCEDURE — 81001 URINALYSIS AUTO W/SCOPE: CPT | Mod: S$GLB,,, | Performed by: NURSE PRACTITIONER

## 2021-09-17 PROCEDURE — 3079F DIAST BP 80-89 MM HG: CPT | Mod: CPTII,S$GLB,, | Performed by: NURSE PRACTITIONER

## 2021-09-17 PROCEDURE — 3044F PR MOST RECENT HEMOGLOBIN A1C LEVEL <7.0%: ICD-10-PCS | Mod: CPTII,S$GLB,, | Performed by: NURSE PRACTITIONER

## 2021-09-17 PROCEDURE — 99214 OFFICE O/P EST MOD 30 MIN: CPT | Mod: 25,S$GLB,, | Performed by: NURSE PRACTITIONER

## 2021-09-17 PROCEDURE — 4010F PR ACE/ARB THEARPY RXD/TAKEN: ICD-10-PCS | Mod: CPTII,S$GLB,, | Performed by: NURSE PRACTITIONER

## 2021-09-17 PROCEDURE — 1159F MED LIST DOCD IN RCRD: CPT | Mod: CPTII,S$GLB,, | Performed by: NURSE PRACTITIONER

## 2021-09-17 PROCEDURE — 3288F PR FALLS RISK ASSESSMENT DOCUMENTED: ICD-10-PCS | Mod: CPTII,S$GLB,, | Performed by: NURSE PRACTITIONER

## 2021-09-17 PROCEDURE — 1101F PT FALLS ASSESS-DOCD LE1/YR: CPT | Mod: CPTII,S$GLB,, | Performed by: NURSE PRACTITIONER

## 2021-09-17 PROCEDURE — 3008F BODY MASS INDEX DOCD: CPT | Mod: CPTII,S$GLB,, | Performed by: NURSE PRACTITIONER

## 2021-09-17 PROCEDURE — 3074F SYST BP LT 130 MM HG: CPT | Mod: CPTII,S$GLB,, | Performed by: NURSE PRACTITIONER

## 2021-09-17 PROCEDURE — 1126F PR PAIN SEVERITY QUANTIFIED, NO PAIN PRESENT: ICD-10-PCS | Mod: CPTII,S$GLB,, | Performed by: NURSE PRACTITIONER

## 2021-09-17 PROCEDURE — 3288F FALL RISK ASSESSMENT DOCD: CPT | Mod: CPTII,S$GLB,, | Performed by: NURSE PRACTITIONER

## 2021-09-17 PROCEDURE — 3008F PR BODY MASS INDEX (BMI) DOCUMENTED: ICD-10-PCS | Mod: CPTII,S$GLB,, | Performed by: NURSE PRACTITIONER

## 2021-09-17 PROCEDURE — 1159F PR MEDICATION LIST DOCUMENTED IN MEDICAL RECORD: ICD-10-PCS | Mod: CPTII,S$GLB,, | Performed by: NURSE PRACTITIONER

## 2021-09-17 PROCEDURE — 81001 POCT URINALYSIS, DIPSTICK OR TABLET REAGENT, AUTOMATED, WITH MICROSCOP: ICD-10-PCS | Mod: S$GLB,,, | Performed by: NURSE PRACTITIONER

## 2021-09-17 PROCEDURE — 3074F PR MOST RECENT SYSTOLIC BLOOD PRESSURE < 130 MM HG: ICD-10-PCS | Mod: CPTII,S$GLB,, | Performed by: NURSE PRACTITIONER

## 2021-09-17 PROCEDURE — 3044F HG A1C LEVEL LT 7.0%: CPT | Mod: CPTII,S$GLB,, | Performed by: NURSE PRACTITIONER

## 2021-09-17 PROCEDURE — 36415 COLL VENOUS BLD VENIPUNCTURE: CPT | Mod: ,,, | Performed by: NURSE PRACTITIONER

## 2021-09-17 PROCEDURE — 99214 PR OFFICE/OUTPT VISIT, EST, LEVL IV, 30-39 MIN: ICD-10-PCS | Mod: 25,S$GLB,, | Performed by: NURSE PRACTITIONER

## 2021-09-17 RX ORDER — DICLOFENAC SODIUM 75 MG/1
75 TABLET, DELAYED RELEASE ORAL 2 TIMES DAILY
Qty: 180 TABLET | Refills: 1 | Status: SHIPPED | OUTPATIENT
Start: 2021-09-17 | End: 2022-02-01

## 2021-09-17 RX ORDER — DICLOFENAC SODIUM 75 MG/1
75 TABLET, DELAYED RELEASE ORAL 2 TIMES DAILY
Qty: 60 TABLET | Refills: 0 | Status: CANCELLED | OUTPATIENT
Start: 2021-09-17

## 2021-09-17 RX ORDER — DICLOFENAC SODIUM 75 MG/1
75 TABLET, DELAYED RELEASE ORAL 2 TIMES DAILY
Qty: 20 TABLET | Refills: 0 | Status: SHIPPED | OUTPATIENT
Start: 2021-09-17 | End: 2021-09-27

## 2021-09-17 RX ORDER — SERTRALINE HYDROCHLORIDE 25 MG/1
25 TABLET, FILM COATED ORAL DAILY
Qty: 90 TABLET | Refills: 1 | Status: SHIPPED | OUTPATIENT
Start: 2021-09-17 | End: 2022-07-22

## 2021-09-17 RX ORDER — LIRAGLUTIDE 6 MG/ML
1.8 INJECTION SUBCUTANEOUS DAILY
Qty: 27 ML | Refills: 3 | Status: SHIPPED | OUTPATIENT
Start: 2021-09-17 | End: 2022-08-22

## 2021-09-17 RX ORDER — INSULIN ASPART 100 [IU]/ML
INJECTION, SUSPENSION SUBCUTANEOUS
Qty: 120 ML | Refills: 5 | Status: SHIPPED | OUTPATIENT
Start: 2021-09-17 | End: 2022-10-04

## 2021-09-17 RX ORDER — INSULIN PUMP SYRINGE, 3 ML
EACH MISCELLANEOUS
Qty: 1 EACH | Refills: 0 | Status: SHIPPED | OUTPATIENT
Start: 2021-09-17 | End: 2021-11-15

## 2021-09-17 RX ORDER — DAPAGLIFLOZIN AND METFORMIN HYDROCHLORIDE 5; 1000 MG/1; MG/1
1 TABLET, FILM COATED, EXTENDED RELEASE ORAL 2 TIMES DAILY
Qty: 180 TABLET | Refills: 1 | Status: CANCELLED | OUTPATIENT
Start: 2021-09-17

## 2021-09-17 RX ORDER — SULFAMETHOXAZOLE AND TRIMETHOPRIM 800; 160 MG/1; MG/1
1 TABLET ORAL 2 TIMES DAILY
Qty: 14 TABLET | Refills: 0 | Status: SHIPPED | OUTPATIENT
Start: 2021-09-17 | End: 2021-09-24

## 2021-09-17 RX ORDER — DICLOFENAC SODIUM 75 MG/1
75 TABLET, DELAYED RELEASE ORAL
COMMUNITY
End: 2021-09-17 | Stop reason: SDUPTHER

## 2021-09-18 LAB
ESTIMATED AVG GLUCOSE: 120 MG/DL (ref 68–131)
HBA1C MFR BLD: 5.8 % (ref 4–5.6)

## 2021-09-20 ENCOUNTER — TELEPHONE (OUTPATIENT)
Dept: FAMILY MEDICINE | Facility: CLINIC | Age: 71
End: 2021-09-20

## 2021-09-20 DIAGNOSIS — E11.49 TYPE II DIABETES MELLITUS WITH NEUROLOGICAL MANIFESTATIONS: Primary | ICD-10-CM

## 2021-09-20 RX ORDER — DAPAGLIFLOZIN AND METFORMIN HYDROCHLORIDE 5; 1000 MG/1; MG/1
1 TABLET, FILM COATED, EXTENDED RELEASE ORAL DAILY
Qty: 90 TABLET | Refills: 1 | Status: SHIPPED | OUTPATIENT
Start: 2021-09-20 | End: 2022-02-25

## 2021-10-14 ENCOUNTER — PATIENT OUTREACH (OUTPATIENT)
Dept: FAMILY MEDICINE | Facility: CLINIC | Age: 71
End: 2021-10-14

## 2021-10-19 ENCOUNTER — CLINICAL SUPPORT (OUTPATIENT)
Dept: FAMILY MEDICINE | Facility: CLINIC | Age: 71
End: 2021-10-19
Payer: MEDICARE

## 2021-10-19 PROCEDURE — 90694 VACC AIIV4 NO PRSRV 0.5ML IM: CPT | Mod: S$GLB,,, | Performed by: NURSE PRACTITIONER

## 2021-10-19 PROCEDURE — G0008 FLU VACCINE - QUADRIVALENT - ADJUVANTED: ICD-10-PCS | Mod: S$GLB,,, | Performed by: NURSE PRACTITIONER

## 2021-10-19 PROCEDURE — 90694 FLU VACCINE - QUADRIVALENT - ADJUVANTED: ICD-10-PCS | Mod: S$GLB,,, | Performed by: NURSE PRACTITIONER

## 2021-10-19 PROCEDURE — G0008 ADMIN INFLUENZA VIRUS VAC: HCPCS | Mod: S$GLB,,, | Performed by: NURSE PRACTITIONER

## 2021-11-08 ENCOUNTER — PATIENT OUTREACH (OUTPATIENT)
Dept: ADMINISTRATIVE | Facility: HOSPITAL | Age: 71
End: 2021-11-08
Payer: MEDICARE

## 2021-12-21 DIAGNOSIS — E11.9 TYPE 2 DIABETES MELLITUS WITHOUT COMPLICATION: ICD-10-CM

## 2021-12-22 DIAGNOSIS — E11.9 TYPE 2 DIABETES MELLITUS WITHOUT COMPLICATION: ICD-10-CM

## 2021-12-22 DIAGNOSIS — Z12.11 COLON CANCER SCREENING: ICD-10-CM

## 2022-01-01 NOTE — PROGRESS NOTES
[facility] ORTHOPEDICS POST-OP NOTE    Subjective:           Chief Complaint:   Chief Complaint   Patient presents with    Right Hip - Post-op Evaluation     Right FABIANA 1.22.19. States that her hip is doing better. States that her foot does  Swell.        Past Medical History:   Diagnosis Date    Arthritis     Diabetes mellitus, type 2     Encounter for blood transfusion     Hypertension     PONV (postoperative nausea and vomiting)     Wears glasses        Past Surgical History:   Procedure Laterality Date    ARTHROPLASTY, HIP Right 1/22/2019    Performed by Jose Nicole Jr., [facility] at [facility] OR    HIP SURGERY      HYSTERECTOMY      JOINT REPLACEMENT Left     HIP    PARTIAL HYSTERECTOMY      partical toe amputation         Current Outpatient Medications   Medication Sig    aspirin 325 MG tablet Take 1 tablet (325 mg total) by mouth 2 (two) times daily.    atenolol (TENORMIN) 100 MG tablet atenolol 100 mg tablet    dapagliflozin-metformin (XIGDUO XR) 5-1,000 mg TBph Xigduo XR 5 mg-1,000 mg tablet,extended release   Take 1 tablet twice a day by oral route for 90 days.    fenofibrate (TRICOR) 145 MG tablet fenofibrate nanocrystallized 145 mg tablet    insulin aspart protamine-insulin aspart (NOVOLOG MIX 70-30FLEXPEN U-100) 100 unit/mL (70-30) InPn pen Novolog Mix 70-30 FlexPen U-100 Insulin 100 unit/mL subcutaneous pen    omega-3 acid ethyl esters (LOVAZA) 1 gram capsule Take 2 g by mouth once daily.     quinapril-hydrochlorothiazide (ACCURETIC) 20-12.5 mg per tablet quinapril 20 mg-hydrochlorothiazide 12.5 mg tablet    simvastatin (ZOCOR) 20 MG tablet every other day. HS    VICTOZA 3-NANCY 0.6 mg/0.1 mL (18 mg/3 mL) PnIj 2 (two) times daily. 1.8 MG AM AND 1.2 MG HS    diclofenac (VOLTAREN) 75 MG EC tablet 2 (two) times daily.     oxyCODONE-acetaminophen (PERCOCET)  mg per tablet Take 1 tablet by mouth every 6 (six) hours as needed for Pain.     No current facility-administered medications for  1390 this visit.        Review of patient's allergies indicates:   Allergen Reactions    Banana Hives    Penicillins Hives       History reviewed. No pertinent family history.    Social History     Socioeconomic History    Marital status:      Spouse name: Not on file    Number of children: Not on file    Years of education: Not on file    Highest education level: Not on file   Social Needs    Financial resource strain: Not on file    Food insecurity - worry: Not on file    Food insecurity - inability: Not on file    Transportation needs - medical: Not on file    Transportation needs - non-medical: Not on file   Occupational History    Not on file   Tobacco Use    Smoking status: Former Smoker     Last attempt to quit: 1979     Years since quittin.0    Smokeless tobacco: Never Used   Substance and Sexual Activity    Alcohol use: No     Frequency: Never    Drug use: No    Sexual activity: Not on file   Other Topics Concern    Not on file   Social History Narrative    Not on file       History of present illness: Follow-up total hip arthroplasty right. 2 weeks. Doing well with her walker. There was some question about her pain medicines and she shows me the prescription which was not honored to pharmacies for unknown reason. Therefore she been taking no pain medicine      Review of Systems:    Musculoskeletal:  See HPI      Objective:        Physical Examination:    Vital Signs:    Vitals:    19 1259   BP: 138/78   Pulse: 76       Body mass index is 42.91 kg/m².    This a well-developed, well nourished patient in no acute distress.  They are alert and oriented and cooperative to examination.        So looking at her she's walking well with her walker. She does have significant pitting edema 4+ right leg 3+ left leg. She is on a blood pressure pill with thiazide. Looking at the right hip she scat some blisters on the skin from tape that's just mild superficial tape issues I don't  see any hematoma or any other issue underneath took sutures out the wound looks good. Leg length looks very appropriate. Rescue intact the foot no calf tenderness no Homans  Pertinent New Results:    XRAY Report / Interpretation:   AP x-ray right hip shows a total hip arthroplasty in good position,,, comparing the position of that implant to the preoperative left hip total hip implant I think the lengths will be very close.Electronically Signed By Jose Nicole JR, MD    Assessment/Plan:      Plan no tape on the skin no dressing no therapy graduate to cane. I wrote her a new prescription for Norco 10 was Zofran because she gets nauseated. See her back in 4 weeks. I think she's doing well I would like AP pelvis on the next visit    This note was created using Dragon voice recognition software that occasionally misinterpreted phrases or words.

## 2022-05-23 ENCOUNTER — TELEPHONE (OUTPATIENT)
Dept: PODIATRY | Facility: CLINIC | Age: 72
End: 2022-05-23
Payer: MEDICARE

## 2022-05-23 RX ORDER — GABAPENTIN 100 MG/1
100 CAPSULE ORAL DAILY
Qty: 90 CAPSULE | Refills: 0 | Status: SHIPPED | OUTPATIENT
Start: 2022-05-23 | End: 2022-07-22 | Stop reason: SDUPTHER

## 2022-05-23 RX ORDER — GABAPENTIN 400 MG/1
400 CAPSULE ORAL NIGHTLY
Qty: 90 CAPSULE | Refills: 0 | Status: SHIPPED | OUTPATIENT
Start: 2022-05-23 | End: 2022-07-22 | Stop reason: SDUPTHER

## 2022-05-23 NOTE — TELEPHONE ENCOUNTER
----- Message from Aura Ethan sent at 5/23/2022  9:23 AM CDT -----  Regarding: refill  Type: Needs Medical Advice    Who Called:      Best Call Back Number:   Additional Information: Requesting a call back from Nurse, regarding pt needs a refill for Rx gabapentin (NEURONTIN) 100 MG capsule sent not mail order not local ,please advise and call back     [facility] - [place], [place] - [address]   Phone:  [phone]  Fax:  [phone]

## 2022-05-23 NOTE — TELEPHONE ENCOUNTER
Patient requesting refills of gabapentin 100mg and 400mg sent through mail order. Please advise.    [facility] - [place], [place] - [address]   Phone: [phone]   Fax: [phone]

## 2022-05-23 NOTE — TELEPHONE ENCOUNTER
Patient informed of prescription and verbalized she will need an office visit for further refills.

## 2022-06-23 DIAGNOSIS — E11.9 TYPE 2 DIABETES MELLITUS WITHOUT COMPLICATION, UNSPECIFIED WHETHER LONG TERM INSULIN USE: ICD-10-CM

## 2022-06-23 DIAGNOSIS — Z12.31 OTHER SCREENING MAMMOGRAM: ICD-10-CM

## 2022-07-01 ENCOUNTER — TELEPHONE (OUTPATIENT)
Dept: FAMILY MEDICINE | Facility: CLINIC | Age: 72
End: 2022-07-01
Payer: MEDICARE

## 2022-07-07 ENCOUNTER — PATIENT OUTREACH (OUTPATIENT)
Dept: ADMINISTRATIVE | Facility: HOSPITAL | Age: 72
End: 2022-07-07
Payer: MEDICARE

## 2022-07-07 NOTE — LETTER
July 7, 2022    Marifer Bernardo  [address] [place] [address] Rd  [place] [address]             [facility]  [address]  [facility] [address]  Phone: [phone] Dear MsAshely Az,    Good morning!!    I am reaching out to you because you are over due for a Diabetic eye exam. Have you had an eye exam in the last year,  if so where did you have it? If not,  would you like me to schedule you an eye exam with one of our Physician's at [facility]?    Thanks so much and have a great day!    Ly HOLT LPN [facility]  [facility]  [address] [address]  P- [phone]  F- [phone]

## 2022-07-13 ENCOUNTER — PATIENT OUTREACH (OUTPATIENT)
Dept: ADMINISTRATIVE | Facility: HOSPITAL | Age: 72
End: 2022-07-13
Payer: MEDICARE

## 2022-07-13 NOTE — LETTER
July 13, 2022    Marifer Bernardo  [address] [place] [address] Rd  [place] [address]             [facility]  [address]  [facility] [address]  Phone: [phone] Dear Ms. Bernardo,    Good evening!!     I am reaching out to you because you are over due for your Mammogram. Have you had one in the last year, if so where did you have it done? If not, would you like me to schedule a Mammogram for you?    Thanks so much!    Ly HOLT LPN [facility]  [address] [facility]  [place] [address]  P- [phone]  F- [phone]

## 2022-07-22 ENCOUNTER — LAB VISIT (OUTPATIENT)
Dept: LAB | Facility: CLINIC | Age: 72
End: 2022-07-22
Payer: MEDICARE

## 2022-07-22 ENCOUNTER — OFFICE VISIT (OUTPATIENT)
Dept: FAMILY MEDICINE | Facility: CLINIC | Age: 72
End: 2022-07-22
Payer: MEDICARE

## 2022-07-22 VITALS
TEMPERATURE: 98 F | HEIGHT: 64 IN | OXYGEN SATURATION: 96 % | HEART RATE: 67 BPM | SYSTOLIC BLOOD PRESSURE: 138 MMHG | WEIGHT: 256.81 LBS | BODY MASS INDEX: 43.84 KG/M2 | DIASTOLIC BLOOD PRESSURE: 80 MMHG

## 2022-07-22 DIAGNOSIS — Z53.20 MAMMOGRAM DECLINED: ICD-10-CM

## 2022-07-22 DIAGNOSIS — Z79.4 TYPE 2 DIABETES MELLITUS WITH COMPLICATION, WITH LONG-TERM CURRENT USE OF INSULIN: Primary | ICD-10-CM

## 2022-07-22 DIAGNOSIS — E78.2 MIXED HYPERLIPIDEMIA: ICD-10-CM

## 2022-07-22 DIAGNOSIS — Z11.4 SCREENING FOR HIV WITHOUT PRESENCE OF RISK FACTORS: ICD-10-CM

## 2022-07-22 DIAGNOSIS — E11.8 TYPE 2 DIABETES MELLITUS WITH COMPLICATION, WITH LONG-TERM CURRENT USE OF INSULIN: ICD-10-CM

## 2022-07-22 DIAGNOSIS — M17.11 PRIMARY OSTEOARTHRITIS OF RIGHT KNEE: ICD-10-CM

## 2022-07-22 DIAGNOSIS — Z11.59 ENCOUNTER FOR HEPATITIS C SCREENING TEST FOR LOW RISK PATIENT: ICD-10-CM

## 2022-07-22 DIAGNOSIS — E11.9 TYPE 2 DIABETES MELLITUS WITHOUT COMPLICATION: ICD-10-CM

## 2022-07-22 DIAGNOSIS — I10 ESSENTIAL HYPERTENSION: ICD-10-CM

## 2022-07-22 DIAGNOSIS — E11.42 DIABETIC POLYNEUROPATHY ASSOCIATED WITH TYPE 2 DIABETES MELLITUS: ICD-10-CM

## 2022-07-22 DIAGNOSIS — E11.8 TYPE 2 DIABETES MELLITUS WITH COMPLICATION, WITH LONG-TERM CURRENT USE OF INSULIN: Primary | ICD-10-CM

## 2022-07-22 DIAGNOSIS — Z79.4 TYPE 2 DIABETES MELLITUS WITH COMPLICATION, WITH LONG-TERM CURRENT USE OF INSULIN: ICD-10-CM

## 2022-07-22 DIAGNOSIS — L97.522 SKIN ULCER OF LEFT FOOT WITH FAT LAYER EXPOSED: ICD-10-CM

## 2022-07-22 DIAGNOSIS — E66.01 MORBID OBESITY: ICD-10-CM

## 2022-07-22 LAB
ALBUMIN SERPL BCP-MCNC: 3.9 G/DL (ref 3.5–5.2)
ALBUMIN/CREAT UR: NORMAL UG/MG (ref 0–30)
ALP SERPL-CCNC: 56 U/L (ref 55–135)
ALT SERPL W/O P-5'-P-CCNC: 23 U/L (ref 10–44)
ANION GAP SERPL CALC-SCNC: 13 MMOL/L (ref 8–16)
AST SERPL-CCNC: 30 U/L (ref 10–40)
BASOPHILS # BLD AUTO: 0.05 K/UL (ref 0–0.2)
BASOPHILS NFR BLD: 0.8 % (ref 0–1.9)
BILIRUB SERPL-MCNC: 0.5 MG/DL (ref 0.1–1)
BUN SERPL-MCNC: 25 MG/DL (ref 8–23)
CALCIUM SERPL-MCNC: 9.7 MG/DL (ref 8.7–10.5)
CHLORIDE SERPL-SCNC: 105 MMOL/L (ref 95–110)
CHOLEST SERPL-MCNC: 153 MG/DL (ref 120–199)
CHOLEST/HDLC SERPL: 3.6 {RATIO} (ref 2–5)
CO2 SERPL-SCNC: 26 MMOL/L (ref 23–29)
CREAT SERPL-MCNC: 0.8 MG/DL (ref 0.5–1.4)
CREAT UR-MCNC: 18 MG/DL (ref 15–325)
DIFFERENTIAL METHOD: ABNORMAL
EOSINOPHIL # BLD AUTO: 0.1 K/UL (ref 0–0.5)
EOSINOPHIL NFR BLD: 1.4 % (ref 0–8)
ERYTHROCYTE [DISTWIDTH] IN BLOOD BY AUTOMATED COUNT: 15.3 % (ref 11.5–14.5)
EST. GFR  (AFRICAN AMERICAN): >60 ML/MIN/1.73 M^2
EST. GFR  (NON AFRICAN AMERICAN): >60 ML/MIN/1.73 M^2
ESTIMATED AVG GLUCOSE: 120 MG/DL (ref 68–131)
GLUCOSE SERPL-MCNC: 125 MG/DL (ref 70–110)
HBA1C MFR BLD: 5.8 % (ref 4–5.6)
HCT VFR BLD AUTO: 46.8 % (ref 37–48.5)
HDLC SERPL-MCNC: 42 MG/DL (ref 40–75)
HDLC SERPL: 27.5 % (ref 20–50)
HGB BLD-MCNC: 14.8 G/DL (ref 12–16)
IMM GRANULOCYTES # BLD AUTO: 0.04 K/UL (ref 0–0.04)
IMM GRANULOCYTES NFR BLD AUTO: 0.6 % (ref 0–0.5)
LDLC SERPL CALC-MCNC: 77.2 MG/DL (ref 63–159)
LYMPHOCYTES # BLD AUTO: 1.7 K/UL (ref 1–4.8)
LYMPHOCYTES NFR BLD: 26.4 % (ref 18–48)
MCH RBC QN AUTO: 28.2 PG (ref 27–31)
MCHC RBC AUTO-ENTMCNC: 31.6 G/DL (ref 32–36)
MCV RBC AUTO: 89 FL (ref 82–98)
MICROALBUMIN UR DL<=1MG/L-MCNC: <5 UG/ML
MONOCYTES # BLD AUTO: 0.4 K/UL (ref 0.3–1)
MONOCYTES NFR BLD: 6.7 % (ref 4–15)
NEUTROPHILS # BLD AUTO: 4 K/UL (ref 1.8–7.7)
NEUTROPHILS NFR BLD: 64.1 % (ref 38–73)
NONHDLC SERPL-MCNC: 111 MG/DL
NRBC BLD-RTO: 0 /100 WBC
PLATELET # BLD AUTO: 295 K/UL (ref 150–450)
PMV BLD AUTO: 9.8 FL (ref 9.2–12.9)
POTASSIUM SERPL-SCNC: 4.6 MMOL/L (ref 3.5–5.1)
PROT SERPL-MCNC: 7.9 G/DL (ref 6–8.4)
RBC # BLD AUTO: 5.24 M/UL (ref 4–5.4)
SODIUM SERPL-SCNC: 144 MMOL/L (ref 136–145)
TRIGL SERPL-MCNC: 169 MG/DL (ref 30–150)
WBC # BLD AUTO: 6.28 K/UL (ref 3.9–12.7)

## 2022-07-22 PROCEDURE — 1160F PR REVIEW ALL MEDS BY PRESCRIBER/CLIN PHARMACIST DOCUMENTED: ICD-10-PCS | Mod: CPTII,S$GLB,, | Performed by: NURSE PRACTITIONER

## 2022-07-22 PROCEDURE — 82570 ASSAY OF URINE CREATININE: CPT | Performed by: NURSE PRACTITIONER

## 2022-07-22 PROCEDURE — 4010F PR ACE/ARB THEARPY RXD/TAKEN: ICD-10-PCS | Mod: CPTII,S$GLB,, | Performed by: NURSE PRACTITIONER

## 2022-07-22 PROCEDURE — 3075F PR MOST RECENT SYSTOLIC BLOOD PRESS GE 130-139MM HG: ICD-10-PCS | Mod: CPTII,S$GLB,, | Performed by: NURSE PRACTITIONER

## 2022-07-22 PROCEDURE — 1159F MED LIST DOCD IN RCRD: CPT | Mod: CPTII,S$GLB,, | Performed by: NURSE PRACTITIONER

## 2022-07-22 PROCEDURE — 1160F RVW MEDS BY RX/DR IN RCRD: CPT | Mod: CPTII,S$GLB,, | Performed by: NURSE PRACTITIONER

## 2022-07-22 PROCEDURE — 83036 HEMOGLOBIN GLYCOSYLATED A1C: CPT | Performed by: NURSE PRACTITIONER

## 2022-07-22 PROCEDURE — 3008F BODY MASS INDEX DOCD: CPT | Mod: CPTII,S$GLB,, | Performed by: NURSE PRACTITIONER

## 2022-07-22 PROCEDURE — 3079F DIAST BP 80-89 MM HG: CPT | Mod: CPTII,S$GLB,, | Performed by: NURSE PRACTITIONER

## 2022-07-22 PROCEDURE — 3288F PR FALLS RISK ASSESSMENT DOCUMENTED: ICD-10-PCS | Mod: CPTII,S$GLB,, | Performed by: NURSE PRACTITIONER

## 2022-07-22 PROCEDURE — 80061 LIPID PANEL: CPT | Performed by: NURSE PRACTITIONER

## 2022-07-22 PROCEDURE — 99999 PR PBB SHADOW E&M-EST. PATIENT-LVL V: CPT | Mod: PBBFAC,,, | Performed by: NURSE PRACTITIONER

## 2022-07-22 PROCEDURE — 99214 OFFICE O/P EST MOD 30 MIN: CPT | Mod: S$GLB,,, | Performed by: NURSE PRACTITIONER

## 2022-07-22 PROCEDURE — 87389 HIV-1 AG W/HIV-1&-2 AB AG IA: CPT | Performed by: NURSE PRACTITIONER

## 2022-07-22 PROCEDURE — 3079F PR MOST RECENT DIASTOLIC BLOOD PRESSURE 80-89 MM HG: ICD-10-PCS | Mod: CPTII,S$GLB,, | Performed by: NURSE PRACTITIONER

## 2022-07-22 PROCEDURE — 80053 COMPREHEN METABOLIC PANEL: CPT | Performed by: NURSE PRACTITIONER

## 2022-07-22 PROCEDURE — 1125F AMNT PAIN NOTED PAIN PRSNT: CPT | Mod: CPTII,S$GLB,, | Performed by: NURSE PRACTITIONER

## 2022-07-22 PROCEDURE — 3075F SYST BP GE 130 - 139MM HG: CPT | Mod: CPTII,S$GLB,, | Performed by: NURSE PRACTITIONER

## 2022-07-22 PROCEDURE — 1101F PR PT FALLS ASSESS DOC 0-1 FALLS W/OUT INJ PAST YR: ICD-10-PCS | Mod: CPTII,S$GLB,, | Performed by: NURSE PRACTITIONER

## 2022-07-22 PROCEDURE — 4010F ACE/ARB THERAPY RXD/TAKEN: CPT | Mod: CPTII,S$GLB,, | Performed by: NURSE PRACTITIONER

## 2022-07-22 PROCEDURE — 99999 PR PBB SHADOW E&M-EST. PATIENT-LVL V: ICD-10-PCS | Mod: PBBFAC,,, | Performed by: NURSE PRACTITIONER

## 2022-07-22 PROCEDURE — 85025 COMPLETE CBC W/AUTO DIFF WBC: CPT | Performed by: NURSE PRACTITIONER

## 2022-07-22 PROCEDURE — 3008F PR BODY MASS INDEX (BMI) DOCUMENTED: ICD-10-PCS | Mod: CPTII,S$GLB,, | Performed by: NURSE PRACTITIONER

## 2022-07-22 PROCEDURE — 1159F PR MEDICATION LIST DOCUMENTED IN MEDICAL RECORD: ICD-10-PCS | Mod: CPTII,S$GLB,, | Performed by: NURSE PRACTITIONER

## 2022-07-22 PROCEDURE — 86803 HEPATITIS C AB TEST: CPT | Performed by: NURSE PRACTITIONER

## 2022-07-22 PROCEDURE — 99214 PR OFFICE/OUTPT VISIT, EST, LEVL IV, 30-39 MIN: ICD-10-PCS | Mod: S$GLB,,, | Performed by: NURSE PRACTITIONER

## 2022-07-22 PROCEDURE — 1125F PR PAIN SEVERITY QUANTIFIED, PAIN PRESENT: ICD-10-PCS | Mod: CPTII,S$GLB,, | Performed by: NURSE PRACTITIONER

## 2022-07-22 PROCEDURE — 3288F FALL RISK ASSESSMENT DOCD: CPT | Mod: CPTII,S$GLB,, | Performed by: NURSE PRACTITIONER

## 2022-07-22 PROCEDURE — 1101F PT FALLS ASSESS-DOCD LE1/YR: CPT | Mod: CPTII,S$GLB,, | Performed by: NURSE PRACTITIONER

## 2022-07-22 PROCEDURE — 36415 PR COLLECTION VENOUS BLOOD,VENIPUNCTURE: ICD-10-PCS | Mod: PN,,, | Performed by: STUDENT IN AN ORGANIZED HEALTH CARE EDUCATION/TRAINING PROGRAM

## 2022-07-22 PROCEDURE — 36415 COLL VENOUS BLD VENIPUNCTURE: CPT | Mod: PN,,, | Performed by: STUDENT IN AN ORGANIZED HEALTH CARE EDUCATION/TRAINING PROGRAM

## 2022-07-22 RX ORDER — GABAPENTIN 100 MG/1
100 CAPSULE ORAL DAILY
Qty: 90 CAPSULE | Refills: 1 | Status: SHIPPED | OUTPATIENT
Start: 2022-07-22 | End: 2022-10-20

## 2022-07-22 RX ORDER — GABAPENTIN 400 MG/1
400 CAPSULE ORAL NIGHTLY
Qty: 90 CAPSULE | Refills: 1 | Status: SHIPPED | OUTPATIENT
Start: 2022-07-22 | End: 2022-10-24

## 2022-07-22 RX ORDER — DICLOFENAC SODIUM 75 MG/1
75 TABLET, DELAYED RELEASE ORAL 2 TIMES DAILY
Qty: 180 TABLET | Refills: 1 | Status: SHIPPED | OUTPATIENT
Start: 2022-07-22 | End: 2023-04-10 | Stop reason: SDUPTHER

## 2022-07-22 NOTE — PROGRESS NOTES
Subjective:       Patient ID: Marifer Bernardo is a 72 y.o. female.    Chief Complaint: Follow-up (Pt here for DM check up/Pt is fasting for labs/Pt wants to discuss some medications/Pt declines mammogram and colonoscopy) and Diabetes      Vimal Bernardo presents to the clinic today for routine follow up visit, she is due for blood work and medication refills.   She has a medical history of diabetes mellitus, hypertension, hyperlipidemia, morbid obesity, osteoarthritis. 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   She is under the care of Podiatrist Dr. Arreola. Has not followed back up with him, would like to discuss wound care referral.   She is under the care of [facility]- due for her eye exam  Refuses DEXA, Mammogram, Colonoscopy         Review of Systems   Constitutional: Negative.    HENT: Negative.    Eyes: Negative.    Respiratory: Negative for apnea, chest tightness, shortness of breath and wheezing.    Cardiovascular: Negative for chest pain, palpitations and leg swelling.   Gastrointestinal: Negative for abdominal distention, abdominal pain, blood in stool, constipation, nausea and vomiting.   Endocrine: Negative for polydipsia, polyphagia and polyuria.   Genitourinary: Negative for difficulty urinating, frequency, pelvic pain, vaginal bleeding and vaginal pain.   Musculoskeletal: Positive for arthralgias, back pain and gait problem. Negative for joint swelling, neck pain and neck stiffness.   Skin: Positive for wound (  Chronic dorsal left great toe ulcer- ). Negative for color change, pallor and rash.   Allergic/Immunologic: Negative.    Neurological: Positive for numbness. Negative for dizziness, syncope, weakness and headaches.   Hematological: Negative.    Psychiatric/Behavioral: Negative.        Patient Active Problem List   Diagnosis    Osteoarthritis of right hip    Type 2 diabetes mellitus    Essential hypertension    Status post total  replacement of right hip    Skin ulcer of left foot with fat layer exposed    Primary osteoarthritis of right knee    Osteoarthritis of ankle and foot    Unsteady gait    Peroneal tendonitis, unspecified laterality    Ingrown nail    Cellulitis of left lower extremity    Cellulitis of left leg    Morbid obesity    Carpal tunnel syndrome, bilateral    Cervical radiculopathy    Cervical spondylosis without myelopathy    Diabetic polyneuropathy    Long term current use of insulin    Lumbar radiculopathy    Mixed hyperlipidemia    Ulcer of great toe    Ulcer of left foot due to type 2 diabetes mellitus    Type II diabetes mellitus with neurological manifestations    Depressive disorder       Objective:      Physical Exam  Vitals and nursing note reviewed.   Constitutional:       General: She is not in acute distress.     Appearance: Normal appearance. She is obese. She is not ill-appearing.      Comments: Ambulation with Rolator    HENT:      Head: Normocephalic and atraumatic.   Eyes:      Conjunctiva/sclera: Conjunctivae normal.      Comments: Corrective lenses    Cardiovascular:      Rate and Rhythm: Normal rate and regular rhythm.      Pulses: Normal pulses.      Heart sounds: Normal heart sounds. No murmur heard.  Pulmonary:      Effort: Pulmonary effort is normal. No respiratory distress.      Breath sounds: Normal breath sounds.   Abdominal:      General: Bowel sounds are normal.   Musculoskeletal:      Right lower leg: No edema.      Left lower leg: No edema.      Left foot: Deformity present.        Feet:    Feet:      Right foot:      Protective Sensation: 5 sites tested. 4 sites sensed.      Left foot:      Protective Sensation: 5 sites tested. 4 sites sensed.      Skin integrity: Ulcer and callus present.   Skin:     General: Skin is warm and dry.      Capillary Refill: Capillary refill takes 2 to 3 seconds.   Neurological:      General: No focal deficit present.      Mental Status: She  "is alert.   Psychiatric:         Attention and Perception: Attention and perception normal.         Mood and Affect: Mood normal.         Thought Content: Thought content normal.         Cognition and Memory: Cognition and memory normal.         Lab Results   Component Value Date    WBC 4.81 06/28/2020    HGB 11.7 (L) 06/28/2020    HCT 37.3 06/28/2020     06/28/2020    ALT 19 06/27/2020    AST 26 06/27/2020     06/28/2020    K 3.9 06/28/2020     06/28/2020    CREATININE 0.8 06/28/2020    BUN 28 (H) 06/28/2020    CO2 25 06/28/2020    HGBA1C 5.8 (H) 09/17/2021     The ASCVD Risk score (Breanna SMITH Jr., et al., 2013) failed to calculate for the following reasons:    Cannot find a previous HDL lab    Cannot find a previous total cholesterol lab  Visit Vitals  /80 (BP Location: Right arm, Patient Position: Sitting, BP Method: Large (Manual))   Pulse 67   Temp 98.2 °F (36.8 °C)   Ht 5' 4" (1.626 m)   Wt 116.5 kg (256 lb 13.4 oz)   SpO2 96%   BMI 44.09 kg/m²      Assessment:       1. Type 2 diabetes mellitus with complication, with long-term current use of insulin    2. Encounter for hepatitis C screening test for low risk patient    3. Screening for HIV without presence of risk factors    4. Mammogram declined    5. Mixed hyperlipidemia    6. Morbid obesity    7. Essential hypertension    8. Primary osteoarthritis of right knee    9. Skin ulcer of left foot with fat layer exposed        Plan:       1. Type 2 diabetes mellitus with complication, with long-term current use of insulin  -     gabapentin (NEURONTIN) 100 MG capsule  -     gabapentin (NEURONTIN) 400 MG capsule  -     CBC Auto Differential  -     DIABETIC SHOES FOR HOME USE  -     Foot Exam Performed  Take medications as prescribed  weight management- discussed elevated BMI- weight reduction by 10%.   low fat/ low cholesterol diet discussed   portion sizes  Schedule diabetic eye exam   Follow a Low Starch Diet (simple carbohydrate)&nbsp     " Decrease   1. Sugars   2. White Flour   3. White Potatoes   4. White Rice         Exercise 20 mins 3 x week work up to30 mins 4x week    monitor feet daily- if unable to see ask a friend/family member to examine feet or use a mirror. Let PCP know of any skin changes ASAP  GoldBond/moisturizing lotion to skin daily  maintain hydration    2. Encounter for hepatitis C screening test for low risk patient  -     Hepatitis C Antibody    3. Screening for HIV without presence of risk factors  -     HIV 1/2 Ag/Ab (4th Gen)    4. Mammogram declined  Encouraged patient to perform monthly self breast exams   5. Mixed hyperlipidemia    6. Morbid obesity    7. Essential hypertension  -     Comprehensive Metabolic Panel  The patient was counseled on HTN education, management and recommendations. The need for weight reduction was reinforced and a BMI goal of 19 to 25 was set.  Patient was encouraged to adhere to a low sodium diet and a DASH diet was recommended. Patient was also encouraged to engage in routine exercise such as walking most days of the week greater than 30 minutes. Patient education materials were provided to the patient for home review and further reinforcement of topics discussed.     8. Primary osteoarthritis of right knee  -     diclofenac (VOLTAREN) 75 MG EC tablet    9. Skin ulcer of left foot with fat layer exposed  -     Ambulatory referral/consult to Wound Clinic  -     DIABETIC SHOES FOR HOME USE       Follow up in about 3 months (around 10/22/2022).      Future Appointments     Date Provider Specialty Appt Notes    7/22/2022  Lab labs    8/17/2022 Anjali Xiong NP Family Medicine AWV    10/24/2022 Aura Pereyra NP Family Medicine 3 month ck dm

## 2022-07-25 LAB
HCV AB SERPL QL IA: NEGATIVE
HIV 1+2 AB+HIV1 P24 AG SERPL QL IA: NEGATIVE

## 2022-08-30 LAB — HEMOGLOBIN A1C: 5.7 % (ref ?–5.6)

## 2022-08-31 DIAGNOSIS — Z78.0 MENOPAUSE: ICD-10-CM

## 2022-09-26 ENCOUNTER — PATIENT OUTREACH (OUTPATIENT)
Dept: ADMINISTRATIVE | Facility: HOSPITAL | Age: 72
End: 2022-09-26
Payer: MEDICARE

## 2022-09-26 NOTE — PROGRESS NOTES
Called patient regarding scheduling/ overdue HM. No answer. Left message along with contact information to please call back.    Health Maintenance Due   Topic Date Due    Colorectal Cancer Screening  Never done    Pneumococcal Vaccines (Age 65+) (2 - PCV) 11/26/2013    Shingles Vaccine (2 of 3) 12/20/2013    Eye Exam  12/04/2019    DEXA Scan  03/27/2021    COVID-19 Vaccine (2 - Booster for Colleen series) 05/18/2021    Influenza Vaccine (1) 09/01/2022

## 2022-10-07 ENCOUNTER — TELEPHONE (OUTPATIENT)
Dept: FAMILY MEDICINE | Facility: CLINIC | Age: 72
End: 2022-10-07
Payer: MEDICARE

## 2022-10-24 ENCOUNTER — OFFICE VISIT (OUTPATIENT)
Dept: FAMILY MEDICINE | Facility: CLINIC | Age: 72
End: 2022-10-24
Payer: MEDICARE

## 2022-10-24 VITALS
DIASTOLIC BLOOD PRESSURE: 72 MMHG | HEART RATE: 75 BPM | OXYGEN SATURATION: 95 % | WEIGHT: 255.88 LBS | SYSTOLIC BLOOD PRESSURE: 122 MMHG | TEMPERATURE: 98 F | BODY MASS INDEX: 43.92 KG/M2

## 2022-10-24 DIAGNOSIS — E11.42 DIABETIC POLYNEUROPATHY ASSOCIATED WITH TYPE 2 DIABETES MELLITUS: Primary | ICD-10-CM

## 2022-10-24 DIAGNOSIS — M17.11 PRIMARY OSTEOARTHRITIS OF RIGHT KNEE: ICD-10-CM

## 2022-10-24 DIAGNOSIS — M54.16 LUMBAR RADICULOPATHY: ICD-10-CM

## 2022-10-24 DIAGNOSIS — E66.01 MORBID OBESITY: ICD-10-CM

## 2022-10-24 DIAGNOSIS — Z53.20 MAMMOGRAM DECLINED: ICD-10-CM

## 2022-10-24 DIAGNOSIS — I10 ESSENTIAL HYPERTENSION: ICD-10-CM

## 2022-10-24 PROCEDURE — 3061F NEG MICROALBUMINURIA REV: CPT | Mod: CPTII,S$GLB,, | Performed by: NURSE PRACTITIONER

## 2022-10-24 PROCEDURE — 1101F PT FALLS ASSESS-DOCD LE1/YR: CPT | Mod: CPTII,S$GLB,, | Performed by: NURSE PRACTITIONER

## 2022-10-24 PROCEDURE — 3078F DIAST BP <80 MM HG: CPT | Mod: CPTII,S$GLB,, | Performed by: NURSE PRACTITIONER

## 2022-10-24 PROCEDURE — 3066F NEPHROPATHY DOC TX: CPT | Mod: CPTII,S$GLB,, | Performed by: NURSE PRACTITIONER

## 2022-10-24 PROCEDURE — 99999 PR PBB SHADOW E&M-EST. PATIENT-LVL IV: CPT | Mod: PBBFAC,,, | Performed by: NURSE PRACTITIONER

## 2022-10-24 PROCEDURE — 3044F HG A1C LEVEL LT 7.0%: CPT | Mod: CPTII,S$GLB,, | Performed by: NURSE PRACTITIONER

## 2022-10-24 PROCEDURE — 1101F PR PT FALLS ASSESS DOC 0-1 FALLS W/OUT INJ PAST YR: ICD-10-PCS | Mod: CPTII,S$GLB,, | Performed by: NURSE PRACTITIONER

## 2022-10-24 PROCEDURE — 3066F PR DOCUMENTATION OF TREATMENT FOR NEPHROPATHY: ICD-10-PCS | Mod: CPTII,S$GLB,, | Performed by: NURSE PRACTITIONER

## 2022-10-24 PROCEDURE — 1160F RVW MEDS BY RX/DR IN RCRD: CPT | Mod: CPTII,S$GLB,, | Performed by: NURSE PRACTITIONER

## 2022-10-24 PROCEDURE — 1159F MED LIST DOCD IN RCRD: CPT | Mod: CPTII,S$GLB,, | Performed by: NURSE PRACTITIONER

## 2022-10-24 PROCEDURE — 1160F PR REVIEW ALL MEDS BY PRESCRIBER/CLIN PHARMACIST DOCUMENTED: ICD-10-PCS | Mod: CPTII,S$GLB,, | Performed by: NURSE PRACTITIONER

## 2022-10-24 PROCEDURE — 99214 PR OFFICE/OUTPT VISIT, EST, LEVL IV, 30-39 MIN: ICD-10-PCS | Mod: S$GLB,,, | Performed by: NURSE PRACTITIONER

## 2022-10-24 PROCEDURE — 1159F PR MEDICATION LIST DOCUMENTED IN MEDICAL RECORD: ICD-10-PCS | Mod: CPTII,S$GLB,, | Performed by: NURSE PRACTITIONER

## 2022-10-24 PROCEDURE — 99999 PR PBB SHADOW E&M-EST. PATIENT-LVL IV: ICD-10-PCS | Mod: PBBFAC,,, | Performed by: NURSE PRACTITIONER

## 2022-10-24 PROCEDURE — 99214 OFFICE O/P EST MOD 30 MIN: CPT | Mod: S$GLB,,, | Performed by: NURSE PRACTITIONER

## 2022-10-24 PROCEDURE — 3074F PR MOST RECENT SYSTOLIC BLOOD PRESSURE < 130 MM HG: ICD-10-PCS | Mod: CPTII,S$GLB,, | Performed by: NURSE PRACTITIONER

## 2022-10-24 PROCEDURE — 3074F SYST BP LT 130 MM HG: CPT | Mod: CPTII,S$GLB,, | Performed by: NURSE PRACTITIONER

## 2022-10-24 PROCEDURE — 3078F PR MOST RECENT DIASTOLIC BLOOD PRESSURE < 80 MM HG: ICD-10-PCS | Mod: CPTII,S$GLB,, | Performed by: NURSE PRACTITIONER

## 2022-10-24 PROCEDURE — 3288F PR FALLS RISK ASSESSMENT DOCUMENTED: ICD-10-PCS | Mod: CPTII,S$GLB,, | Performed by: NURSE PRACTITIONER

## 2022-10-24 PROCEDURE — 3288F FALL RISK ASSESSMENT DOCD: CPT | Mod: CPTII,S$GLB,, | Performed by: NURSE PRACTITIONER

## 2022-10-24 PROCEDURE — 1126F AMNT PAIN NOTED NONE PRSNT: CPT | Mod: CPTII,S$GLB,, | Performed by: NURSE PRACTITIONER

## 2022-10-24 PROCEDURE — 4010F ACE/ARB THERAPY RXD/TAKEN: CPT | Mod: CPTII,S$GLB,, | Performed by: NURSE PRACTITIONER

## 2022-10-24 PROCEDURE — 1126F PR PAIN SEVERITY QUANTIFIED, NO PAIN PRESENT: ICD-10-PCS | Mod: CPTII,S$GLB,, | Performed by: NURSE PRACTITIONER

## 2022-10-24 PROCEDURE — 3044F PR MOST RECENT HEMOGLOBIN A1C LEVEL <7.0%: ICD-10-PCS | Mod: CPTII,S$GLB,, | Performed by: NURSE PRACTITIONER

## 2022-10-24 PROCEDURE — 3061F PR NEG MICROALBUMINURIA RESULT DOCUMENTED/REVIEW: ICD-10-PCS | Mod: CPTII,S$GLB,, | Performed by: NURSE PRACTITIONER

## 2022-10-24 PROCEDURE — 4010F PR ACE/ARB THEARPY RXD/TAKEN: ICD-10-PCS | Mod: CPTII,S$GLB,, | Performed by: NURSE PRACTITIONER

## 2022-10-24 RX ORDER — GABAPENTIN 100 MG/1
100 CAPSULE ORAL DAILY
COMMUNITY
End: 2023-06-09 | Stop reason: SDUPTHER

## 2022-10-24 NOTE — PROGRESS NOTES
Subjective:       Patient ID: Marifer Bernardo is a 72 y.o. female.    Chief Complaint: Follow-up (3 month follow up ), Diabetes (Reports monitors 2-3 times per day with values ranging 90's fasting in mornings with some lows into 60's and 70's at night. ), Hypertension (Patient reports she does monitor BP values every day around 5:30pm. Reports values are typically 120's/70's. ), and Flu Vaccine (Patient would like to receive flu vaccine while here in office today. )    Vimal Bernardo presents to the clinic today for routine follow up visit  [facility] of Diabetes mellitus, hypertension, hyperlipidemia, morbid obesity, osteoarthritis. 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   She is under the care of Podiatrist Dr. Arreola in the past for recurrent diabetic foot ulcer- has not followed back up with him.   She is under the care of [facility]- due for her eye exam  Refuses DEXA, Mammogram, Colonoscopy     Diabetes:  Reports monitors 2-3 times per day with values ranging 90's fasting in mornings with some lows into 60's and 70's at night. Has obtained her diabetic shoes since her last appointment. Reports that her ulcer to the bottom of her left foot has not reopened since obtaining her shoes.     Hypertension:  Patient reports she does monitor BP values every day around 5:30pm. Reports values are typically 120's/70's.     Flu Vaccine:  Patient would like to receive flu vaccine while here in office today.       Review of Systems   Constitutional: Negative.    HENT: Negative.     Eyes: Negative.    Respiratory:  Negative for chest tightness, shortness of breath and wheezing.    Cardiovascular:  Negative for chest pain, palpitations and leg swelling.   Gastrointestinal:  Negative for abdominal pain, constipation and diarrhea.   Endocrine: Negative.    Genitourinary:  Negative for difficulty urinating and vaginal pain.   Musculoskeletal:  Positive for arthralgias and gait  problem.   Skin:  Negative for wound.   Allergic/Immunologic: Negative.    Neurological:  Positive for numbness. Negative for dizziness and weakness.   Hematological: Negative.    Psychiatric/Behavioral: Negative.       Patient Active Problem List   Diagnosis    Osteoarthritis of right hip    Type 2 diabetes mellitus    Essential hypertension    Status post total replacement of right hip    Skin ulcer of left foot with fat layer exposed    Primary osteoarthritis of right knee    Osteoarthritis of ankle and foot    Unsteady gait    Peroneal tendonitis, unspecified laterality    Ingrown nail    Cellulitis of left lower extremity    Cellulitis of left leg    Morbid obesity    Carpal tunnel syndrome, bilateral    Cervical radiculopathy    Cervical spondylosis without myelopathy    Diabetic polyneuropathy    Long term current use of insulin    Lumbar radiculopathy    Mixed hyperlipidemia    Ulcer of great toe    Ulcer of left foot due to type 2 diabetes mellitus    Type II diabetes mellitus with neurological manifestations    Depressive disorder       Objective:      Physical Exam  Vitals and nursing note reviewed.   Constitutional:       Appearance: Normal appearance. She is obese.      Comments: Ambulation with Rolator    HENT:      Head: Normocephalic and atraumatic.   Eyes:      Conjunctiva/sclera: Conjunctivae normal.      Comments: Corrective lenses    Cardiovascular:      Rate and Rhythm: Normal rate and regular rhythm.      Pulses: Normal pulses.   Pulmonary:      Effort: Pulmonary effort is normal.      Breath sounds: Normal breath sounds.   Musculoskeletal:         General: Normal range of motion.      Cervical back: Normal range of motion and neck supple.      Right lower leg: No edema.      Left lower leg: No edema.        Feet:    Feet:      Left foot:      Skin integrity: No ulcer.      Comments: Healed ulcer to dorsal left foot   Skin:     General: Skin is warm and dry.      Capillary Refill: Capillary  refill takes less than 2 seconds.   Neurological:      General: No focal deficit present.      Mental Status: She is alert.   Psychiatric:         Attention and Perception: Attention and perception normal.         Mood and Affect: Mood normal.         Behavior: Behavior normal.         Cognition and Memory: Cognition normal.       Lab Results   Component Value Date    WBC 6.28 07/22/2022    HGB 14.8 07/22/2022    HCT 46.8 07/22/2022     07/22/2022    CHOL 153 07/22/2022    TRIG 169 (H) 07/22/2022    HDL 42 07/22/2022    ALT 23 07/22/2022    AST 30 07/22/2022     07/22/2022    K 4.6 07/22/2022     07/22/2022    CREATININE 0.8 07/22/2022    BUN 25 (H) 07/22/2022    CO2 26 07/22/2022    HGBA1C 5.8 (H) 07/22/2022     The 10-year ASCVD risk score (Adrianna EUGENE, et al., 2019) is: 25%    Values used to calculate the score:      Age: 72 years      Sex: Female      Is Non- : No      Diabetic: Yes      Tobacco smoker: No      Systolic Blood Pressure: 122 mmHg      Is BP treated: Yes      HDL Cholesterol: 42 mg/dL      Total Cholesterol: 153 mg/dL  Visit Vitals  /72 (BP Location: Left arm, Patient Position: Sitting, BP Method: X-Large (Manual))   Pulse 75   Temp 98 °F (36.7 °C) (Oral)   Wt 116 kg (255 lb 13.5 oz)   SpO2 95%   BMI 43.92 kg/m²      Assessment:       1. Diabetic polyneuropathy associated with type 2 diabetes mellitus    2. Essential hypertension    3. Primary osteoarthritis of right knee    4. Mammogram declined    5. Morbid obesity    6. Lumbar radiculopathy        Plan:       1. Diabetic polyneuropathy associated with type 2 diabetes mellitus  Take medications as prescribed  weight management- discussed elevated BMI- weight reduction by 10%.   low fat/ low cholesterol diet discussed   portion sizes    Follow a Low Starch Diet (simple carbohydrate)&nbsp     Decrease   1. Sugars   2. White Flour   3. White Potatoes   4. White Rice     Exercise 20 mins 3 x week work up  to30 mins 4x week    monitor feet daily- if unable to see ask a friend/family member to examine feet or use a mirror. Let PCP know of any skin changes ASAP  GoldBond/moisturizing lotion to skin daily  maintain hydration  A1c prior to next appointment   2. Essential hypertension  The patient was counseled on HTN education, management and recommendations. The need for weight reduction was reinforced and a BMI goal of 19 to 25 was set.  Patient was encouraged to adhere to a low sodium diet and a DASH diet was recommended. Patient was also encouraged to engage in routine exercise such as walking most days of the week greater than 30 minutes. Patient education materials were provided to the patient for home review and further reinforcement of topics discussed.     Please monitor your blood pressure twice a day.  Make sure you have not had any caffeine or tobacco with in 45 minutes of checking your blood pressure.    3. Primary osteoarthritis of right knee  Maintain activity: goal of at least 150 minutes low weight bearing activity weekly   4. Mammogram declined  Monthly self breast exams encouraged   5. Morbid obesity    6. Lumbar radiculopathy   Offered physical therapy referral- will notify us if she wishes to pursue.   Follow up in about 3 months (around 1/24/2023).      Future Appointments       Date Provider Specialty Appt Notes    2/10/2023 Aura Pereyra NP Family Medicine 3 month f/u

## 2023-01-12 DIAGNOSIS — I10 ESSENTIAL HYPERTENSION: ICD-10-CM

## 2023-01-12 RX ORDER — QUINAPRIL HYDROCHLORIDE AND HYDROCHLOROTHIAZIDE 20; 12.5 MG/1; MG/1
1 TABLET, FILM COATED ORAL DAILY
Qty: 90 TABLET | Refills: 3 | Status: SHIPPED | OUTPATIENT
Start: 2023-01-12 | End: 2023-07-11 | Stop reason: CLARIF

## 2023-01-12 NOTE — TELEPHONE ENCOUNTER
----- Message from Zenaida Anderson sent at 1/12/2023  8:54 AM CST -----  Contact: Patient  Type:  RX Refill Request    Who Called: Patient    Refill or New Rx:refill    RX Name and Strength:quinapriL-hydrochlorothiazide (ACCURETIC) 20-12.5 mg per tablet    How is the patient currently taking it? (ex. 1XDay): 1 x day     Is this a 30 day or 90 day RX:90    [facility] - [facility] [address]  [address] [address]  Phone: [phone] Fax: [phone]        Local or Mail Order:Mail    Ordering Provider:Hafsa    Would the patient rather a call back or a response via [facility]? Call    Best Call Back Number:[phone] (home)     Additional Information: Patient requesting refills. Please call to advise

## 2023-02-13 ENCOUNTER — TELEPHONE (OUTPATIENT)
Dept: FAMILY MEDICINE | Facility: CLINIC | Age: 73
End: 2023-02-13
Payer: MEDICARE

## 2023-02-13 NOTE — TELEPHONE ENCOUNTER
Spoke to patient. Notified that we are in network and she probably received the letter due to Dr. Mary retiring. Patient stated understanding.

## 2023-02-13 NOTE — TELEPHONE ENCOUNTER
----- Message from Devorah Saldana sent at 2/13/2023  9:53 AM CST -----  Regarding: advise  Contact: Patient  Type: Needs Medical Advice  Who Called:Patient  Symptoms (please be specific):  recall letter  How long has patient had these symptoms:    Pharmacy name and phone #:    Best Call Back Number: [phone]     Additional Information: Patient states she received a letter stating Dr. Mary does not take her medicare HMO. She states she no longer sees him and needs to be advised if you still take he insurance HMO medicare .Thanks!

## 2023-02-28 ENCOUNTER — TELEPHONE (OUTPATIENT)
Dept: FAMILY MEDICINE | Facility: CLINIC | Age: 73
End: 2023-02-28
Payer: MEDICARE

## 2023-02-28 RX ORDER — GABAPENTIN 400 MG/1
400 CAPSULE ORAL NIGHTLY
COMMUNITY
Start: 2022-10-10 | End: 2023-02-28 | Stop reason: SDUPTHER

## 2023-02-28 RX ORDER — GABAPENTIN 400 MG/1
400 CAPSULE ORAL NIGHTLY
Qty: 90 CAPSULE | Refills: 1 | Status: SHIPPED | OUTPATIENT
Start: 2023-02-28 | End: 2023-06-05 | Stop reason: SDUPTHER

## 2023-02-28 NOTE — TELEPHONE ENCOUNTER
Spoke with patient, received refill request for gabapentin 400mg, has cancelled last appointments, made her appointment 04/10/23 at 11:30 am, she only wants to see Aura

## 2023-03-02 DIAGNOSIS — E11.9 TYPE 2 DIABETES MELLITUS WITHOUT COMPLICATION: ICD-10-CM

## 2023-03-06 ENCOUNTER — PATIENT OUTREACH (OUTPATIENT)
Dept: ADMINISTRATIVE | Facility: HOSPITAL | Age: 73
End: 2023-03-06
Payer: MEDICARE

## 2023-03-06 NOTE — LETTER
AUTHORIZATION FOR RELEASE OF   CONFIDENTIAL INFORMATION    DR WRAY    We are seeing Marifer Bernardo, date of birth 1950, in the clinic at [facility] MEDICINE 1ST FLOOR. Aura Pereyra NP is the patient's PCP. Marifer Bernardo has an outstanding lab/procedure at the time we reviewed her chart. In order to help keep her health information updated, she has authorized us to request the following medical record(s):        (  )  MAMMOGRAM                                      (  )  COLONOSCOPY      (  )  PAP SMEAR                                          (  )  OUTSIDE LAB RESULTS     (  )  DEXA SCAN                                          ( X )  EYE EXAM            (  )  FOOT EXAM                                          (  )  ENTIRE RECORD     (  )  OUTSIDE IMMUNIZATIONS                 (  )  _______________         Please fax records to [facility], Jessica Necaise, NP, [phone]    Thank you in advance,       Kiki STEINER  Care Coordinator  [facility]  [address] [place] [address] [place] [address]  Phone [phone]  Fax [phone]           Patient Name: Marifer Bernardo  : 1950  Patient Phone #: [phone]

## 2023-03-09 ENCOUNTER — PATIENT OUTREACH (OUTPATIENT)
Dept: ADMINISTRATIVE | Facility: HOSPITAL | Age: 73
End: 2023-03-09
Payer: MEDICARE

## 2023-03-09 NOTE — PROGRESS NOTES
UPLICATE EYE EXAM RECEIVED. LAST EYE EXAM WITH DR WRAY   12/4/18 AS OF 3/9/23. THIS EXAM IS ALREADY HYPERLINKED.

## 2023-03-14 ENCOUNTER — PATIENT OUTREACH (OUTPATIENT)
Dept: ADMINISTRATIVE | Facility: HOSPITAL | Age: 73
End: 2023-03-14
Payer: MEDICARE

## 2023-03-14 NOTE — LETTER
AUTHORIZATION FOR RELEASE OF   CONFIDENTIAL INFORMATION    DR WRAY    We are seeing Marifer Bernardo, date of birth 1950, in the clinic at [facility] MEDICINE 1ST FLOOR. Aura Pereyra NP is the patient's PCP. Marifer Bernardo has an outstanding lab/procedure at the time we reviewed her chart. In order to help keep her health information updated, she has authorized us to request the following medical record(s):        (  )  MAMMOGRAM                                      (  )  COLONOSCOPY      (  )  PAP SMEAR                                          (  )  OUTSIDE LAB RESULTS     (  )  DEXA SCAN                                          (  X)  EYE EXAM            (  )  FOOT EXAM                                          (  )  ENTIRE RECORD     (  )  OUTSIDE IMMUNIZATIONS                 (  )  _______________         Please fax records to [facility], Jessica Necaise, NP, [phone]    Thank you in advance,       Kiki STEINER  Care Coordinator  [facility]  [address] [place] [address] [place] [address]  Phone [phone]  Fax [phone]           Patient Name: Marifer Bernardo  : 1950  Patient Phone #: [phone]

## 2023-04-10 ENCOUNTER — OFFICE VISIT (OUTPATIENT)
Dept: FAMILY MEDICINE | Facility: CLINIC | Age: 73
End: 2023-04-10
Payer: MEDICARE

## 2023-04-10 VITALS
WEIGHT: 246.69 LBS | BODY MASS INDEX: 42.35 KG/M2 | HEART RATE: 76 BPM | SYSTOLIC BLOOD PRESSURE: 112 MMHG | TEMPERATURE: 98 F | OXYGEN SATURATION: 98 % | DIASTOLIC BLOOD PRESSURE: 62 MMHG

## 2023-04-10 DIAGNOSIS — I10 ESSENTIAL HYPERTENSION: ICD-10-CM

## 2023-04-10 DIAGNOSIS — Z78.0 ENCOUNTER FOR OSTEOPOROSIS SCREENING IN ASYMPTOMATIC POSTMENOPAUSAL PATIENT: ICD-10-CM

## 2023-04-10 DIAGNOSIS — E11.42 DIABETIC POLYNEUROPATHY ASSOCIATED WITH TYPE 2 DIABETES MELLITUS: Primary | ICD-10-CM

## 2023-04-10 DIAGNOSIS — M17.11 PRIMARY OSTEOARTHRITIS OF RIGHT KNEE: ICD-10-CM

## 2023-04-10 DIAGNOSIS — Z53.20 MAMMOGRAM DECLINED: ICD-10-CM

## 2023-04-10 DIAGNOSIS — E78.2 MIXED HYPERLIPIDEMIA: ICD-10-CM

## 2023-04-10 DIAGNOSIS — L60.2 OVERGROWN TOENAILS: ICD-10-CM

## 2023-04-10 DIAGNOSIS — E66.01 MORBID OBESITY: ICD-10-CM

## 2023-04-10 DIAGNOSIS — Z13.820 ENCOUNTER FOR OSTEOPOROSIS SCREENING IN ASYMPTOMATIC POSTMENOPAUSAL PATIENT: ICD-10-CM

## 2023-04-10 DIAGNOSIS — E11.49 TYPE II DIABETES MELLITUS WITH NEUROLOGICAL MANIFESTATIONS: ICD-10-CM

## 2023-04-10 DIAGNOSIS — Z53.20 COLON CANCER SCREENING DECLINED: ICD-10-CM

## 2023-04-10 PROCEDURE — 4010F ACE/ARB THERAPY RXD/TAKEN: CPT | Mod: CPTII,,, | Performed by: NURSE PRACTITIONER

## 2023-04-10 PROCEDURE — 99214 OFFICE O/P EST MOD 30 MIN: CPT | Mod: ,,, | Performed by: NURSE PRACTITIONER

## 2023-04-10 PROCEDURE — 3074F PR MOST RECENT SYSTOLIC BLOOD PRESSURE < 130 MM HG: ICD-10-PCS | Mod: CPTII,,, | Performed by: NURSE PRACTITIONER

## 2023-04-10 PROCEDURE — 1159F PR MEDICATION LIST DOCUMENTED IN MEDICAL RECORD: ICD-10-PCS | Mod: CPTII,,, | Performed by: NURSE PRACTITIONER

## 2023-04-10 PROCEDURE — 3008F BODY MASS INDEX DOCD: CPT | Mod: CPTII,,, | Performed by: NURSE PRACTITIONER

## 2023-04-10 PROCEDURE — 1101F PT FALLS ASSESS-DOCD LE1/YR: CPT | Mod: CPTII,,, | Performed by: NURSE PRACTITIONER

## 2023-04-10 PROCEDURE — 1160F PR REVIEW ALL MEDS BY PRESCRIBER/CLIN PHARMACIST DOCUMENTED: ICD-10-PCS | Mod: CPTII,,, | Performed by: NURSE PRACTITIONER

## 2023-04-10 PROCEDURE — 3008F PR BODY MASS INDEX (BMI) DOCUMENTED: ICD-10-PCS | Mod: CPTII,,, | Performed by: NURSE PRACTITIONER

## 2023-04-10 PROCEDURE — 3078F PR MOST RECENT DIASTOLIC BLOOD PRESSURE < 80 MM HG: ICD-10-PCS | Mod: CPTII,,, | Performed by: NURSE PRACTITIONER

## 2023-04-10 PROCEDURE — 99214 PR OFFICE/OUTPT VISIT, EST, LEVL IV, 30-39 MIN: ICD-10-PCS | Mod: ,,, | Performed by: NURSE PRACTITIONER

## 2023-04-10 PROCEDURE — 3074F SYST BP LT 130 MM HG: CPT | Mod: CPTII,,, | Performed by: NURSE PRACTITIONER

## 2023-04-10 PROCEDURE — 1159F MED LIST DOCD IN RCRD: CPT | Mod: CPTII,,, | Performed by: NURSE PRACTITIONER

## 2023-04-10 PROCEDURE — 1125F AMNT PAIN NOTED PAIN PRSNT: CPT | Mod: CPTII,,, | Performed by: NURSE PRACTITIONER

## 2023-04-10 PROCEDURE — 1160F RVW MEDS BY RX/DR IN RCRD: CPT | Mod: CPTII,,, | Performed by: NURSE PRACTITIONER

## 2023-04-10 PROCEDURE — 3288F PR FALLS RISK ASSESSMENT DOCUMENTED: ICD-10-PCS | Mod: CPTII,,, | Performed by: NURSE PRACTITIONER

## 2023-04-10 PROCEDURE — 3288F FALL RISK ASSESSMENT DOCD: CPT | Mod: CPTII,,, | Performed by: NURSE PRACTITIONER

## 2023-04-10 PROCEDURE — 1125F PR PAIN SEVERITY QUANTIFIED, PAIN PRESENT: ICD-10-PCS | Mod: CPTII,,, | Performed by: NURSE PRACTITIONER

## 2023-04-10 PROCEDURE — 3078F DIAST BP <80 MM HG: CPT | Mod: CPTII,,, | Performed by: NURSE PRACTITIONER

## 2023-04-10 PROCEDURE — 1101F PR PT FALLS ASSESS DOC 0-1 FALLS W/OUT INJ PAST YR: ICD-10-PCS | Mod: CPTII,,, | Performed by: NURSE PRACTITIONER

## 2023-04-10 PROCEDURE — 4010F PR ACE/ARB THEARPY RXD/TAKEN: ICD-10-PCS | Mod: CPTII,,, | Performed by: NURSE PRACTITIONER

## 2023-04-10 RX ORDER — DAPAGLIFLOZIN AND METFORMIN HYDROCHLORIDE 5; 1000 MG/1; MG/1
1 TABLET, FILM COATED, EXTENDED RELEASE ORAL EVERY MORNING
Qty: 90 TABLET | Refills: 3 | Status: SHIPPED | OUTPATIENT
Start: 2023-04-10 | End: 2024-04-09

## 2023-04-10 RX ORDER — ATENOLOL 100 MG/1
100 TABLET ORAL DAILY
Qty: 90 TABLET | Refills: 1 | Status: SHIPPED | OUTPATIENT
Start: 2023-04-10 | End: 2023-06-05 | Stop reason: SDUPTHER

## 2023-04-10 RX ORDER — DICLOFENAC SODIUM 75 MG/1
75 TABLET, DELAYED RELEASE ORAL 2 TIMES DAILY
Qty: 180 TABLET | Refills: 1 | Status: SHIPPED | OUTPATIENT
Start: 2023-04-10 | End: 2023-10-16 | Stop reason: SDUPTHER

## 2023-04-10 RX ORDER — SIMVASTATIN 20 MG/1
20 TABLET, FILM COATED ORAL NIGHTLY
Qty: 90 TABLET | Refills: 3 | Status: SHIPPED | OUTPATIENT
Start: 2023-04-10

## 2023-04-10 NOTE — PROGRESS NOTES
Subjective:       Patient ID: Marifer Bernardo is a 73 y.o. female.    Chief Complaint: Follow-up (3 month follow up ), Diabetes (Patient reports she does monitor glucose at home 4 times daily. Patient reports that her glucose ranges  fasting. Patient reports last eye exam to be prior to COVID pandemic with [facility] -  Dr. Kee. ), and Hypertension (Patient reports she does monitor BP at home once daily with values ranging 120's/80's. )     Vimal Bernardo presents to the clinic today for routine follow up visit  [facility] of Diabetes mellitus, hypertension, hyperlipidemia, morbid obesity, osteoarthritis. 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   She is under the care of Podiatrist Dr. Arreola in the past for recurrent diabetic foot ulcer- has not followed back up with him. Has been intermittently receiving foot care at local wound care clinic.     Refuses Mammogram, Colonoscopy- reports she will complete a DEXA      Diabetes:  Reports monitors 4 times per day with values ranging 90's-130's Has obtained her diabetic shoes since her last appointment. Reports that her ulcer to the bottom of her left foot has not reopened since obtaining her shoes.   She is due for a diabetic eye exam with [facility] -  Dr. Kee. Last screen prior to COVID pandemic   Has decreased her Novolog dose to 1/2 prescribed dose due to decreased blood sugar readings.     Hypertension:  Patient reports she does monitor BP values every day around 5:30pm. Reports values are typically 120's/80's.    Hypertriglyceridemia  Insurance no longer covering [place], but reports that she has almost 1 year left of medications already at home. Requests medication to not be reordered to pharmacy- states she will take otc fish oil once she finishes what is left of her medications.              Review of Systems   Constitutional: Negative.    HENT: Negative.     Eyes:  Negative.    Respiratory:  Negative for chest tightness, shortness of breath and wheezing.    Cardiovascular:  Negative for chest pain, palpitations and leg swelling.   Gastrointestinal:  Negative for abdominal pain, constipation and diarrhea.   Endocrine: Negative.    Genitourinary:  Negative for difficulty urinating and vaginal pain.   Musculoskeletal:  Positive for arthralgias and gait problem.   Skin:  Negative for wound.   Allergic/Immunologic: Negative.    Neurological:  Positive for numbness. Negative for dizziness and weakness.   Hematological: Negative.    Psychiatric/Behavioral: Negative.       Patient Active Problem List   Diagnosis    Osteoarthritis of right hip    Type 2 diabetes mellitus    Essential hypertension    Status post total replacement of right hip    Skin ulcer of left foot with fat layer exposed    Primary osteoarthritis of right knee    Osteoarthritis of ankle and foot    Unsteady gait    Peroneal tendonitis, unspecified laterality    Ingrown nail    Cellulitis of left lower extremity    Cellulitis of left leg    Morbid obesity    Carpal tunnel syndrome, bilateral    Cervical radiculopathy    Cervical spondylosis without myelopathy    Diabetic polyneuropathy    Long term current use of insulin    Lumbar radiculopathy    Mixed hyperlipidemia    Ulcer of great toe    Ulcer of left foot due to type 2 diabetes mellitus    Type II diabetes mellitus with neurological manifestations    Depressive disorder       Objective:      Physical Exam  Vitals and nursing note reviewed.   Constitutional:       General: She is not in acute distress.     Appearance: Normal appearance. She is obese.      Comments: Ambulation with Rolator    Eyes:      Conjunctiva/sclera: Conjunctivae normal.      Comments: Corrective lenses    Cardiovascular:      Rate and Rhythm: Normal rate and regular rhythm.      Heart sounds: Normal heart sounds. No murmur heard.  Pulmonary:      Effort: Pulmonary effort is normal. No  respiratory distress.      Breath sounds: Normal breath sounds. No wheezing.   Musculoskeletal:         General: Normal range of motion.      Cervical back: Normal range of motion and neck supple.      Right lower leg: No edema.      Left lower leg: No edema.        Feet:    Feet:      Right foot:      Skin integrity: Skin integrity normal.      Toenail Condition: Right toenails are abnormally thick and long.      Left foot:      Skin integrity: Skin integrity normal. No ulcer.      Toenail Condition: Left toenails are abnormally thick and long.      Comments: Healed ulcer to dorsal left foot     Current use of diabetic shoes   Skin:     General: Skin is warm and dry.      Capillary Refill: Capillary refill takes less than 2 seconds.   Neurological:      General: No focal deficit present.      Mental Status: She is alert.   Psychiatric:         Attention and Perception: Attention and perception normal.         Mood and Affect: Mood normal.         Behavior: Behavior normal.         Cognition and Memory: Cognition normal.       Lab Results   Component Value Date    WBC 6.28 07/22/2022    HGB 14.8 07/22/2022    HCT 46.8 07/22/2022     07/22/2022    CHOL 153 07/22/2022    TRIG 169 (H) 07/22/2022    HDL 42 07/22/2022    ALT 23 07/22/2022    AST 30 07/22/2022     07/22/2022    K 4.6 07/22/2022     07/22/2022    CREATININE 0.8 07/22/2022    BUN 25 (H) 07/22/2022    CO2 26 07/22/2022    HGBA1C 5.7 (A) 08/30/2022     The 10-year ASCVD risk score (Adrianna EUGENE, et al., 2019) is: 23.7%    Values used to calculate the score:      Age: 73 years      Sex: Female      Is Non- : No      Diabetic: Yes      Tobacco smoker: No      Systolic Blood Pressure: 112 mmHg      Is BP treated: Yes      HDL Cholesterol: 42 mg/dL      Total Cholesterol: 153 mg/dL  Visit Vitals  /62 (BP Location: Right arm, Patient Position: Sitting, BP Method: X-Large (Manual))   Pulse 76   Temp 98.3 °F (36.8 °C)  (Oral)   Wt 111.9 kg (246 lb 11.1 oz)   SpO2 98%   BMI 42.35 kg/m²      Assessment:       1. Diabetic polyneuropathy associated with type 2 diabetes mellitus    2. Essential hypertension    3. Morbid obesity    4. Mixed hyperlipidemia    5. Mammogram declined    6. Primary osteoarthritis of right knee    7. Type II diabetes mellitus with neurological manifestations    8. Encounter for osteoporosis screening in asymptomatic postmenopausal patient    9. Overgrown toenails    10. Colon cancer screening declined        Plan:       1. Diabetic polyneuropathy associated with type 2 diabetes mellitus  -     Comprehensive Metabolic Panel; Future; Expected date: 07/10/2023  -     Microalbumin/Creatinine Ratio, Urine; Future; Expected date: 07/10/2023  -     Ambulatory referral/consult to Podiatry; Future; Expected date: 04/17/2023  Take medications as prescribed  weight management- discussed elevated BMI- weight reduction by 10%.   low fat/ low cholesterol diet discussed   portion sizes  Schedule diabetic eye exam   Follow a Low Starch Diet (simple carbohydrate)&nbsp     Decrease   1. Sugars   2. White Flour   3. White Potatoes   4. White Rice     Exercise 20 mins 3 x week work up to30 mins 4x week    monitor feet daily- if unable to see ask a friend/family member to examine feet or use a mirror. Let PCP know of any skin changes ASAP  GoldBond/moisturizing lotion to skin daily  maintain hydration    2. Essential hypertension  -     atenoloL (TENORMIN) 100 MG tablet; Take 1 tablet (100 mg total) by mouth once daily.  Dispense: 90 tablet; Refill: 1  -     Microalbumin/Creatinine Ratio, Urine; Future; Expected date: 07/10/2023  The patient was counseled on HTN education, management and recommendations. The need for weight reduction was reinforced and a BMI goal of 19 to 25 was set.  Patient was encouraged to adhere to a low sodium diet and a DASH diet was recommended. Patient was also encouraged to engage in routine exercise  such as walking most days of the week greater than 30 minutes. Patient education materials were provided to the patient for home review and further reinforcement of topics discussed.     Please monitor your blood pressure twice a day.  Make sure you have not had any caffeine or tobacco with in 45 minutes of checking your blood pressure.      3. Morbid obesity  Continue dietary/lifestyle modifications  Goal is to decrease BMI by 10%  4. Mixed hyperlipidemia  -     simvastatin (ZOCOR) 20 MG tablet; Take 1 tablet (20 mg total) by mouth every evening. HS Monday, Wednesday, Friday  Dispense: 90 tablet; Refill: 3  -     Lipid Panel; Future; Expected date: 07/10/2023  Continue statin,fenofibrate   5. Mammogram declined  Discussed importance of screenings for early detection/treatment. Denies family history, reports normal in the past- does not wish to pursue   Encouraged monthly self breast exams   6. Primary osteoarthritis of right knee  -     diclofenac (VOLTAREN) 75 MG EC tablet; Take 1 tablet (75 mg total) by mouth 2 (two) times daily.  Dispense: 180 tablet; Refill: 1    7. Type II diabetes mellitus with neurological manifestations  -     dapagliflozin-metformin (XIGDUO XR) 5-1,000 mg; Take 1 tablet by mouth every morning.  Dispense: 90 tablet; Refill: 3    8. Encounter for osteoporosis screening in asymptomatic postmenopausal patient  -     DXA Body Composition; Future; Expected date: 04/10/2023  Order submitted to [place]- patient encouraged us to notify once completed as they out of our system and I can not guarantee when results are sent to us for viewing and that this could delay immediate follow up and treatments depending on results- Patient voiced understanding.  9. Overgrown toenails  -     Ambulatory referral/consult to Podiatry; Future; Expected date: 04/17/2023    10. Colon cancer screening declined  Discussed importance of screenings for early detection/treatment. Denied family history, does not wish to  pursue.   Follow up in about 3 months (around 7/10/2023).      Future Appointments       Date Provider Specialty Appt Notes    4/26/2023 Giacomo Cohn DPM Podiatry Diabetes, nail care    7/7/2023  Lab fasting    7/11/2023 Aura Pereyra NP Family Medicine annual

## 2023-04-19 ENCOUNTER — PATIENT OUTREACH (OUTPATIENT)
Dept: ADMINISTRATIVE | Facility: HOSPITAL | Age: 73
End: 2023-04-19
Payer: MEDICARE

## 2023-04-20 ENCOUNTER — PATIENT OUTREACH (OUTPATIENT)
Dept: ADMINISTRATIVE | Facility: HOSPITAL | Age: 73
End: 2023-04-20
Payer: MEDICARE

## 2023-04-26 ENCOUNTER — OFFICE VISIT (OUTPATIENT)
Dept: PODIATRY | Facility: CLINIC | Age: 73
End: 2023-04-26
Payer: MEDICARE

## 2023-04-26 VITALS — BODY MASS INDEX: 42 KG/M2 | HEIGHT: 64 IN | WEIGHT: 246 LBS

## 2023-04-26 DIAGNOSIS — E11.42 DIABETIC POLYNEUROPATHY ASSOCIATED WITH TYPE 2 DIABETES MELLITUS: ICD-10-CM

## 2023-04-26 DIAGNOSIS — L60.2 OVERGROWN TOENAILS: ICD-10-CM

## 2023-04-26 DIAGNOSIS — L60.9 DISEASE OF NAIL: Primary | ICD-10-CM

## 2023-04-26 DIAGNOSIS — E11.49 TYPE II DIABETES MELLITUS WITH NEUROLOGICAL MANIFESTATIONS: ICD-10-CM

## 2023-04-26 PROCEDURE — 11721 DEBRIDE NAIL 6 OR MORE: CPT | Mod: Q9,,, | Performed by: PODIATRIST

## 2023-04-26 PROCEDURE — 1159F MED LIST DOCD IN RCRD: CPT | Mod: CPTII,,, | Performed by: PODIATRIST

## 2023-04-26 PROCEDURE — 3008F BODY MASS INDEX DOCD: CPT | Mod: CPTII,,, | Performed by: PODIATRIST

## 2023-04-26 PROCEDURE — 1160F RVW MEDS BY RX/DR IN RCRD: CPT | Mod: CPTII,,, | Performed by: PODIATRIST

## 2023-04-26 PROCEDURE — 4010F ACE/ARB THERAPY RXD/TAKEN: CPT | Mod: CPTII,,, | Performed by: PODIATRIST

## 2023-04-26 PROCEDURE — 3008F PR BODY MASS INDEX (BMI) DOCUMENTED: ICD-10-PCS | Mod: CPTII,,, | Performed by: PODIATRIST

## 2023-04-26 PROCEDURE — 99499 NO LOS: ICD-10-PCS | Mod: ,,, | Performed by: PODIATRIST

## 2023-04-26 PROCEDURE — 99499 UNLISTED E&M SERVICE: CPT | Mod: ,,, | Performed by: PODIATRIST

## 2023-04-26 PROCEDURE — 11721 ROUTINE FOOT CARE: ICD-10-PCS | Mod: Q9,,, | Performed by: PODIATRIST

## 2023-04-26 PROCEDURE — 1126F PR PAIN SEVERITY QUANTIFIED, NO PAIN PRESENT: ICD-10-PCS | Mod: CPTII,,, | Performed by: PODIATRIST

## 2023-04-26 PROCEDURE — 1159F PR MEDICATION LIST DOCUMENTED IN MEDICAL RECORD: ICD-10-PCS | Mod: CPTII,,, | Performed by: PODIATRIST

## 2023-04-26 PROCEDURE — 4010F PR ACE/ARB THEARPY RXD/TAKEN: ICD-10-PCS | Mod: CPTII,,, | Performed by: PODIATRIST

## 2023-04-26 PROCEDURE — 1160F PR REVIEW ALL MEDS BY PRESCRIBER/CLIN PHARMACIST DOCUMENTED: ICD-10-PCS | Mod: CPTII,,, | Performed by: PODIATRIST

## 2023-04-26 PROCEDURE — 1126F AMNT PAIN NOTED NONE PRSNT: CPT | Mod: CPTII,,, | Performed by: PODIATRIST

## 2023-04-27 NOTE — PROGRESS NOTES
Subjective:     Patient ID: Marifer Bernardo is a 73 y.o. female.    Chief Complaint: Diabetic Foot Exam    Marifer is a 73 y.o. female who presents to the clinic for evaluation and treatment of high risk feet. Marifer has a past medical history of Arthritis, Diabetes mellitus, type 2, Encounter for blood transfusion, Hypertension, PONV (postoperative nausea and vomiting), and Wears glasses. The patient's chief complaint is long, thick toenails. This patient has documented high risk feet requiring routine maintenance secondary to peripheral neuropathy.    PCP: Aura Pereyra NP    Date Last Seen by PCP: 04/10/2023    Current shoe gear:  Affected Foot: Tennis shoes     Unaffected Foot: Tennis shoes    Hemoglobin A1C   Date Value Ref Range Status   08/30/2022 5.7 (A) 5.6 % Final   07/22/2022 5.8 (H) 4.0 - 5.6 % Final     Comment:     ADA Screening Guidelines:  5.7-6.4%  Consistent with prediabetes  >or=6.5%  Consistent with diabetes    High levels of fetal hemoglobin interfere with the HbA1C  assay. Heterozygous hemoglobin variants (HbS, HgC, etc)do  not significantly interfere with this assay.   However, presence of multiple variants may affect accuracy.     09/17/2021 5.8 (H) 4.0 - 5.6 % Final     Comment:     ADA Screening Guidelines:  5.7-6.4%  Consistent with prediabetes  >or=6.5%  Consistent with diabetes    High levels of fetal hemoglobin interfere with the HbA1C  assay. Heterozygous hemoglobin variants (HbS, HgC, etc)do  not significantly interfere with this assay.   However, presence of multiple variants may affect accuracy.     03/05/2021 6.0 (H) 4.0 - 5.6 % Final     Comment:     ADA Screening Guidelines:  5.7-6.4%  Consistent with prediabetes  >or=6.5%  Consistent with diabetes    High levels of fetal hemoglobin interfere with the HbA1C  assay. Heterozygous hemoglobin variants (HbS, HgC, etc)do  not significantly interfere with this assay.   However, presence of multiple variants may affect accuracy.          Review of Systems   Constitutional: Negative for chills and fever.   Cardiovascular:  Positive for leg swelling. Negative for chest pain.   Respiratory:  Negative for cough and shortness of breath.    Gastrointestinal:  Negative for diarrhea, nausea and vomiting.   Neurological:  Positive for numbness and paresthesias.      Objective:     Physical Exam  Vitals reviewed.   Constitutional:       General: She is not in acute distress.     Appearance: Normal appearance. She is not ill-appearing.   HENT:      Nose: Nose normal.   Cardiovascular:      Rate and Rhythm: Normal rate.   Pulmonary:      Effort: Pulmonary effort is normal. No respiratory distress.   Skin:     Capillary Refill: Capillary refill takes 2 to 3 seconds.   Neurological:      Mental Status: She is alert and oriented to person, place, and time.   Psychiatric:         Mood and Affect: Mood normal.         Behavior: Behavior normal.         Thought Content: Thought content normal.         Judgment: Judgment normal.       Dermatologic:  Absence of nail plate of the right 2nd and 3rd digit, nails 1 through 5 of the left foot and right 1st 4th and 5th are mildly thickened, mildly discolored and elongated, no open lesions noted bilateral foot, no rashes noted bilateral foot, no interdigital maceration noted bilateral foot, mild hyperkeratotic skin lesion noted to the plantar aspect of the left 1st digit near the IPJ, mild hyperpigmentation noted to the lower 1/3 of the leg   Vascular: DP and PT pulses palpable 1/4 bilateral foot, +1 nonpitting edema noted to bilateral ankle, pedal hair growth is absent to the digits bilateral foot, skin temperature gradient warm to cool from proximal distal bilateral foot, capillary fill time less than 3 seconds to distal aspect of the digits   Neurologic: Protective and light touch sensation intact bilateral lower extremity, positive paresthesias reported, positive occasional burning reported  Musculoskeletal:  5/5  muscle strength noted bilateral foot, ankle joint range of motion is reduced without pain, mild tenderness with palpation plantar aspect of the left 1st digit at soft tissue callus from previous procedures.    Assessment:      Encounter Diagnoses   Name Primary?    Diabetic polyneuropathy associated with type 2 diabetes mellitus     Overgrown toenails     Disease of nail Yes    Type II diabetes mellitus with neurological manifestations      Plan:     Marifer was seen today for diabetic foot exam.    Diagnoses and all orders for this visit:    Disease of nail  -     Routine Foot Care    Diabetic polyneuropathy associated with type 2 diabetes mellitus  -     Ambulatory referral/consult to Podiatry  -     Routine Foot Care    Overgrown toenails  -     Ambulatory referral/consult to Podiatry  -     Routine Foot Care    Type II diabetes mellitus with neurological manifestations  -     Routine Foot Care      I counseled the patient on her conditions, their implications and medical management.        1. Patient was examined and evaluated.    2. Discussed with patient etiology of mild fungal nail changes.  Discussed OTC topical conservative treatments such Vicks vapor rub and tea tree oil   Routine Foot Care    Date/Time: 4/26/2023 1:30 PM  Performed by: Giacomo Cohn DPM  Authorized by: Giacomo Cohn DPM     Consent Done?:  Yes (Verbal)    Nail Care Type:  Debride(Left 1st Toe, Left 2nd Toe, Left 3rd Toe, Left 4th Toe, Left 5th Toe, Right 1st Toe, Right 5th Toe and Right 4th Toe)  Patient tolerance:  Patient tolerated the procedure well with no immediate complications    3. Patient was advised to continue with daily foot monitoring.  Patient will continue efforts at proper glycemic control, lowering hemoglobin A1c, adherence to diabetic medication regimen  4. Discussed with patient mild peripheral vascular changes.  Patient was advised to elevate lower extremity when at rest and consider daily use of compression  therapy.    5. Patient will continue with comfortable shoe gear both inside and outside the home.  Patient will decrease the amount of barefoot walking and decrease the use of slides and flip-flops   6. Patient will follow-up in 3 months or p.r.n. for complaints

## 2023-05-01 ENCOUNTER — PATIENT OUTREACH (OUTPATIENT)
Dept: ADMINISTRATIVE | Facility: HOSPITAL | Age: 73
End: 2023-05-01
Payer: MEDICARE

## 2023-05-01 NOTE — LETTER
AUTHORIZATION FOR RELEASE OF   CONFIDENTIAL INFORMATION    DR WRAY    We are seeing Marifer Bernardo, date of birth 1950, in the clinic at [facility] MEDICINE 1ST FLOOR. Aura Pereyra NP is the patient's PCP. Marifer Bernardo has an outstanding lab/procedure at the time we reviewed her chart. In order to help keep her health information updated, she has authorized us to request the following medical record(s):        (  )  MAMMOGRAM                                      (  )  COLONOSCOPY      (  )  PAP SMEAR                                          (  )  OUTSIDE LAB RESULTS     (  )  DEXA SCAN                                          (  X)  EYE EXAM            (  )  FOOT EXAM                                          (  )  ENTIRE RECORD     (  )  OUTSIDE IMMUNIZATIONS                 (  )  _______________         Please fax records to [facility], Jessica Necaise, NP, [phone]    Thank you in advance,       Kiki STEINER  Care Coordinator  [facility]  [address] [place] [address] [place] [address]  Phone [phone]  Fax [phone]           Patient Name: Marifer Bernardo  : 1950  Patient Phone #: [phone]

## 2023-05-03 ENCOUNTER — PATIENT OUTREACH (OUTPATIENT)
Dept: ADMINISTRATIVE | Facility: HOSPITAL | Age: 73
End: 2023-05-03
Payer: MEDICARE

## 2023-05-29 ENCOUNTER — TELEPHONE (OUTPATIENT)
Dept: FAMILY MEDICINE | Facility: CLINIC | Age: 73
End: 2023-05-29
Payer: MEDICARE

## 2023-05-29 DIAGNOSIS — E11.49 TYPE II DIABETES MELLITUS WITH NEUROLOGICAL MANIFESTATIONS: Primary | ICD-10-CM

## 2023-05-30 ENCOUNTER — PATIENT OUTREACH (OUTPATIENT)
Dept: ADMINISTRATIVE | Facility: HOSPITAL | Age: 73
End: 2023-05-30
Payer: MEDICARE

## 2023-05-30 NOTE — PROGRESS NOTES
Population Health Chart Review & Patient Outreach Details:     Reason for Outreach Encounter:     [x]  Non-Compliant Report   []  Payor Report (Humana, PHN, BCBS, MSSP, MCIP, C, etc.)   []  Pre-Visit Chart Review     Updates Requested / Reviewed:     [x]  Care Everywhere    [x]     []  External Sources (LabCorp, Quest, DIS, etc.)   []  Care Team Updated    Patient Outreach Method:    []  Telephone Outreach Completed   [] Successful   [] Left Voicemail   [x] Unable to Contact (wrong number, no voicemail)  []  [facility] Outreach Sent  [x]  Letter Outreach Mailed  []  Fax Sent for External Records  []  External Records Upload    Health Maintenance Topics Addressed and Outreach Outcomes / Actions Taken:        []      Breast Cancer Screening []  Mammo Scheduled      []  External Records Requested     []  Added Reminder to Complete to Upcoming Primary Care Appt Notes     []  Patient Declined     []  Patient Will Call Back to Schedule     []  Patient Will Schedule with External Provider / Order Routed if Applicable             []       Cervical Cancer Screening []  Pap Scheduled      []  External Records Requested     []  Added Reminder to Complete to Upcoming Primary Care Appt Notes     []  Patient Declined     []  Patient Will Call Back to Schedule     []  Patient Will Schedule with External Provider               []          Colorectal Cancer Screening []  Colonoscopy Case Request or Referral Placed     []  External Records Requested     []  Added Reminder to Complete to Upcoming Primary Care Appt Notes     []  Patient Declined     []  Patient Will Call Back to Schedule     []  Patient Will Schedule with External Provider     []  Fit Kit Mailed (add the SmartPhrase under additional notes)     []  Reminded Patient to Complete Home Test             [x]      Diabetic Eye Exam []  Eye Camera Scheduled or Optometry Referral Placed     []  External Records Requested     [x]  Added Reminder to Complete  to Upcoming Primary Care Appt Notes     []  Patient Declined     []  Patient Will Call Back to Schedule     []  Patient Will Schedule with External Provider             []      Blood Pressure Control []  Primary Care Follow Up Visit Scheduled     []  Remote Blood Pressure Reading Captured     []  Added Reminder to Complete to Upcoming Primary Care Appt Notes     []  Patient Declined     []  Patient Will Call Back / Patient Will Send Portal Message with Reading     []  Patient Will Call Back to Schedule Provider Visit             []       HbA1c & Other Labs []  Lab Appt Scheduled for Due Labs     []  Primary Care Follow Up Visit Scheduled      []  Reminded Patient to Complete Home Test     []  Added Reminder to Complete to Upcoming Primary Care Appt Notes     []  Patient Declined     []  Patient Will Call Back to Schedule     []  Patient Will Schedule with External Provider / Order Routed if Applicable           []    Schedule Primary Care Appt []  Primary Care Appt Scheduled     []  Patient Declined     []  Patient Will Call Back to Schedule     []  Pt Established with External Provider & Updated Care Team             []      Medication Adherence []  Primary Care Appointment Scheduled     []  Added Reminder to Upcoming Primary Care Appt Notes     []  Patient Reminded to  Prescription     []  Patient Declined, Provider Notified if Needed     []  Sent Provider Message to Review and/or Add Exclusion to Problem List             []      Osteoporosis Screening []  DXA Appointment Scheduled     []  External Records Requested     []  Added Reminder to Complete to Upcoming Primary Care Appt Notes     []  Patient Declined     []  Patient Will Call Back to Schedule     []  Patient Will Schedule with External Provider / Order Routed if Applicable     Additional Care Coordinator Notes:         Further Action Needed If Patient Returns Outreach:     Population Health Chart Review & Patient Outreach Details:     Reason for  Outreach Encounter:     []  Non-Compliant Report   []  Payor Report (Humana, PHN, BCBS, MSSP, MCIP, UHC, etc.)   []  Pre-Visit Chart Review     Updates Requested / Reviewed:     []  Care Everywhere    []     []  External Sources (LabCorp, Quest, DIS, etc.)   []  Care Team Updated    Patient Outreach Method:    []  Telephone Outreach Completed   [] Successful   [] Left Voicemail   [] Unable to Contact (wrong number, no voicemail)  []  [email or web address] Outreach Sent  []  Letter Outreach Mailed  []  Fax Sent for External Records  []  External Records Upload    Health Maintenance Topics Addressed and Outreach Outcomes / Actions Taken:        []      Breast Cancer Screening []  Mammo Scheduled      []  External Records Requested     []  Added Reminder to Complete to Upcoming Primary Care Appt Notes     []  Patient Declined     []  Patient Will Call Back to Schedule     []  Patient Will Schedule with External Provider / Order Routed if Applicable             []       Cervical Cancer Screening []  Pap Scheduled      []  External Records Requested     []  Added Reminder to Complete to Upcoming Primary Care Appt Notes     []  Patient Declined     []  Patient Will Call Back to Schedule     []  Patient Will Schedule with External Provider               []          Colorectal Cancer Screening []  Colonoscopy Case Request or Referral Placed     []  External Records Requested     []  Added Reminder to Complete to Upcoming Primary Care Appt Notes     []  Patient Declined     []  Patient Will Call Back to Schedule     []  Patient Will Schedule with External Provider     []  Fit Kit Mailed (add the SmartPhrase under additional notes)     []  Reminded Patient to Complete Home Test             []      Diabetic Eye Exam []  Eye Camera Scheduled or Optometry Referral Placed     []  External Records Requested     []  Added Reminder to Complete to Upcoming Primary Care Appt Notes     []  Patient Declined     []  Patient Will  Call Back to Schedule     []  Patient Will Schedule with External Provider             []      Blood Pressure Control []  Primary Care Follow Up Visit Scheduled     []  Remote Blood Pressure Reading Captured     []  Added Reminder to Complete to Upcoming Primary Care Appt Notes     []  Patient Declined     []  Patient Will Call Back / Patient Will Send Portal Message with Reading     []  Patient Will Call Back to Schedule Provider Visit             []       HbA1c & Other Labs []  Lab Appt Scheduled for Due Labs     []  Primary Care Follow Up Visit Scheduled      []  Reminded Patient to Complete Home Test     []  Added Reminder to Complete to Upcoming Primary Care Appt Notes     []  Patient Declined     []  Patient Will Call Back to Schedule     []  Patient Will Schedule with External Provider / Order Routed if Applicable           []    Schedule Primary Care Appt []  Primary Care Appt Scheduled     []  Patient Declined     []  Patient Will Call Back to Schedule     []  Pt Established with External Provider & Updated Care Team             []      Medication Adherence []  Primary Care Appointment Scheduled     []  Added Reminder to Upcoming Primary Care Appt Notes     []  Patient Reminded to  Prescription     []  Patient Declined, Provider Notified if Needed     []  Sent Provider Message to Review and/or Add Exclusion to Problem List             []      Osteoporosis Screening []  DXA Appointment Scheduled     []  External Records Requested     []  Added Reminder to Complete to Upcoming Primary Care Appt Notes     []  Patient Declined     []  Patient Will Call Back to Schedule     []  Patient Will Schedule with External Provider / Order Routed if Applicable     Additional Care Coordinator Notes:         Further Action Needed If Patient Returns Outreach:

## 2023-05-30 NOTE — LETTER
May 30, 2023    Marifer Bernardo  [address]  [place] [address]             [facility]  [address]  [facility] [address]  Phone: [phone] Dear Marifer Bernardo    Your [facility] primary care team is dedicated to assisting you achieve your health goal.  In order to maintain your goal, scheduling your diabetic health maintenance requirements are hess to successful disease management.     Aura Pereyra NP has reviewed your records and found that you are overdue for your diabetic eye exam.  Yearly eye exams are especially important, as this can identify early eye complications and disease caused by your diabetes.  Aura Pereyra NP has requested you schedule your diabetic eye exam and encourages you to schedule at any of the [facility] locations.   If you have already completed this exam at an outside facility, please notify us so we may request a copy of the exam and update your chart.     Sincerely,    Your [facility]  MARTIN Nixon LPN  Performance Improvement Coordinator  [facility]  [address] [place] [address] [phone]

## 2023-06-05 DIAGNOSIS — I10 ESSENTIAL HYPERTENSION: ICD-10-CM

## 2023-06-05 RX ORDER — GABAPENTIN 400 MG/1
400 CAPSULE ORAL NIGHTLY
Qty: 90 CAPSULE | Refills: 1 | Status: SHIPPED | OUTPATIENT
Start: 2023-06-05 | End: 2023-07-11 | Stop reason: SDUPTHER

## 2023-06-05 RX ORDER — ATENOLOL 100 MG/1
100 TABLET ORAL DAILY
Qty: 90 TABLET | Refills: 1 | Status: SHIPPED | OUTPATIENT
Start: 2023-06-05 | End: 2023-11-28 | Stop reason: SDUPTHER

## 2023-06-05 NOTE — TELEPHONE ENCOUNTER
Received additional fax from [facility] pharmacy requesting refills of atenolol 100mg tablets for patient.

## 2023-06-05 NOTE — TELEPHONE ENCOUNTER
Received faxed refill request from [facility] pharmacy requesting refills of gabapentin 400mg capsules.     Last OV: 4/10/23 w/ NP Necaise  Next OV: 7/11/23 w/ NP Necaise

## 2023-06-09 RX ORDER — GABAPENTIN 100 MG/1
100 CAPSULE ORAL DAILY
Qty: 90 CAPSULE | Refills: 2 | Status: SHIPPED | OUTPATIENT
Start: 2023-06-09 | End: 2023-07-11 | Stop reason: SDUPTHER

## 2023-06-09 NOTE — TELEPHONE ENCOUNTER
----- Message from Devorah Saldana sent at 6/9/2023  9:15 AM CDT -----  Regarding: refill  Contact: patient  Type:  RX Refill Request    Who Called:  Patient    Refill or New Rx:  refill    RX Name and Strength:      gabapentin (NEURONTIN) 100 MG capsule    How is the patient currently taking it? (ex. 1XDay):   Is this a 30 day or 90 day RX:    Preferred Pharmacy with phone number:        [facility] - [facility] [place] [address]  [address] [facility] [address] [address]  Phone: [phone] Fax: [phone]        Local or Mail Order:   Ordering Provider:    Best Call Back Number:  [phone] ([place])     Additional Information:  Please call once the refill has been sent; thanks!

## 2023-06-09 NOTE — TELEPHONE ENCOUNTER
----- Message from Devorah Saldana sent at 6/9/2023  9:15 AM CDT -----  Regarding: refill  Contact: patient  Type:  RX Refill Request    Who Called:  Patient    Refill or New Rx:  refill    RX Name and Strength:      gabapentin (NEURONTIN) 100 MG capsule    How is the patient currently taking it? (ex. 1XDay):   Is this a 30 day or 90 day RX:    Preferred Pharmacy with phone number:        [facility] - [facility] [address]  [address] [address]  Phone: [phone] Fax: [phone]        Local or Mail Order:   Ordering Provider:    Best Call Back Number:  [phone] ([place])     Additional Information:  Please call once the refill has been sent; thanks!

## 2023-07-07 ENCOUNTER — LAB VISIT (OUTPATIENT)
Dept: LAB | Facility: CLINIC | Age: 73
End: 2023-07-07
Payer: MEDICARE

## 2023-07-07 DIAGNOSIS — E11.9 TYPE 2 DIABETES MELLITUS WITHOUT COMPLICATION: ICD-10-CM

## 2023-07-07 DIAGNOSIS — E11.42 DIABETIC POLYNEUROPATHY ASSOCIATED WITH TYPE 2 DIABETES MELLITUS: ICD-10-CM

## 2023-07-07 DIAGNOSIS — E78.2 MIXED HYPERLIPIDEMIA: ICD-10-CM

## 2023-07-07 DIAGNOSIS — I10 ESSENTIAL HYPERTENSION: ICD-10-CM

## 2023-07-07 LAB
ALBUMIN SERPL BCP-MCNC: 3.6 G/DL (ref 3.5–5.2)
ALBUMIN/CREAT UR: NORMAL UG/MG (ref 0–30)
ALP SERPL-CCNC: 78 U/L (ref 55–135)
ALT SERPL W/O P-5'-P-CCNC: 17 U/L (ref 10–44)
ANION GAP SERPL CALC-SCNC: 10 MMOL/L (ref 8–16)
AST SERPL-CCNC: 26 U/L (ref 10–40)
BILIRUB SERPL-MCNC: 0.4 MG/DL (ref 0.1–1)
BUN SERPL-MCNC: 38 MG/DL (ref 8–23)
CALCIUM SERPL-MCNC: 9.4 MG/DL (ref 8.7–10.5)
CHLORIDE SERPL-SCNC: 105 MMOL/L (ref 95–110)
CHOLEST SERPL-MCNC: 136 MG/DL (ref 120–199)
CHOLEST/HDLC SERPL: 3.1 {RATIO} (ref 2–5)
CO2 SERPL-SCNC: 26 MMOL/L (ref 23–29)
CREAT SERPL-MCNC: 0.9 MG/DL (ref 0.5–1.4)
CREAT UR-MCNC: 29 MG/DL (ref 15–325)
EST. GFR  (NO RACE VARIABLE): >60 ML/MIN/1.73 M^2
ESTIMATED AVG GLUCOSE: 108 MG/DL (ref 68–131)
GLUCOSE SERPL-MCNC: 113 MG/DL (ref 70–110)
HBA1C MFR BLD: 5.4 % (ref 4–5.6)
HDLC SERPL-MCNC: 44 MG/DL (ref 40–75)
HDLC SERPL: 32.4 % (ref 20–50)
LDLC SERPL CALC-MCNC: 67 MG/DL (ref 63–159)
MICROALBUMIN UR DL<=1MG/L-MCNC: <5 UG/ML
NONHDLC SERPL-MCNC: 92 MG/DL
POTASSIUM SERPL-SCNC: 4.8 MMOL/L (ref 3.5–5.1)
PROT SERPL-MCNC: 7.2 G/DL (ref 6–8.4)
SODIUM SERPL-SCNC: 141 MMOL/L (ref 136–145)
TRIGL SERPL-MCNC: 125 MG/DL (ref 30–150)

## 2023-07-07 PROCEDURE — 80053 COMPREHEN METABOLIC PANEL: CPT | Performed by: NURSE PRACTITIONER

## 2023-07-07 PROCEDURE — 80061 LIPID PANEL: CPT | Performed by: NURSE PRACTITIONER

## 2023-07-07 PROCEDURE — 82570 ASSAY OF URINE CREATININE: CPT | Performed by: NURSE PRACTITIONER

## 2023-07-07 PROCEDURE — 83036 HEMOGLOBIN GLYCOSYLATED A1C: CPT | Performed by: NURSE PRACTITIONER

## 2023-07-11 ENCOUNTER — OFFICE VISIT (OUTPATIENT)
Dept: FAMILY MEDICINE | Facility: CLINIC | Age: 73
End: 2023-07-11
Payer: MEDICARE

## 2023-07-11 VITALS
WEIGHT: 244.69 LBS | HEART RATE: 76 BPM | SYSTOLIC BLOOD PRESSURE: 138 MMHG | OXYGEN SATURATION: 98 % | BODY MASS INDEX: 42 KG/M2 | DIASTOLIC BLOOD PRESSURE: 76 MMHG

## 2023-07-11 DIAGNOSIS — E11.42 TYPE 2 DIABETES MELLITUS WITH DIABETIC POLYNEUROPATHY, WITH LONG-TERM CURRENT USE OF INSULIN: Primary | ICD-10-CM

## 2023-07-11 DIAGNOSIS — Z53.20 MAMMOGRAM DECLINED: ICD-10-CM

## 2023-07-11 DIAGNOSIS — M16.7 OTHER SECONDARY OSTEOARTHRITIS OF RIGHT HIP: ICD-10-CM

## 2023-07-11 DIAGNOSIS — M54.16 LUMBAR RADICULOPATHY: ICD-10-CM

## 2023-07-11 DIAGNOSIS — I10 ESSENTIAL HYPERTENSION: ICD-10-CM

## 2023-07-11 DIAGNOSIS — Z79.4 TYPE 2 DIABETES MELLITUS WITH DIABETIC POLYNEUROPATHY, WITH LONG-TERM CURRENT USE OF INSULIN: Primary | ICD-10-CM

## 2023-07-11 PROCEDURE — 3288F PR FALLS RISK ASSESSMENT DOCUMENTED: ICD-10-PCS | Mod: CPTII,,, | Performed by: NURSE PRACTITIONER

## 2023-07-11 PROCEDURE — 3008F BODY MASS INDEX DOCD: CPT | Mod: CPTII,,, | Performed by: NURSE PRACTITIONER

## 2023-07-11 PROCEDURE — 99214 OFFICE O/P EST MOD 30 MIN: CPT | Mod: ,,, | Performed by: NURSE PRACTITIONER

## 2023-07-11 PROCEDURE — 1126F PR PAIN SEVERITY QUANTIFIED, NO PAIN PRESENT: ICD-10-PCS | Mod: CPTII,,, | Performed by: NURSE PRACTITIONER

## 2023-07-11 PROCEDURE — 3078F PR MOST RECENT DIASTOLIC BLOOD PRESSURE < 80 MM HG: ICD-10-PCS | Mod: CPTII,,, | Performed by: NURSE PRACTITIONER

## 2023-07-11 PROCEDURE — 3075F SYST BP GE 130 - 139MM HG: CPT | Mod: CPTII,,, | Performed by: NURSE PRACTITIONER

## 2023-07-11 PROCEDURE — 3044F HG A1C LEVEL LT 7.0%: CPT | Mod: CPTII,,, | Performed by: NURSE PRACTITIONER

## 2023-07-11 PROCEDURE — 3078F DIAST BP <80 MM HG: CPT | Mod: CPTII,,, | Performed by: NURSE PRACTITIONER

## 2023-07-11 PROCEDURE — 3008F PR BODY MASS INDEX (BMI) DOCUMENTED: ICD-10-PCS | Mod: CPTII,,, | Performed by: NURSE PRACTITIONER

## 2023-07-11 PROCEDURE — 3061F PR NEG MICROALBUMINURIA RESULT DOCUMENTED/REVIEW: ICD-10-PCS | Mod: CPTII,,, | Performed by: NURSE PRACTITIONER

## 2023-07-11 PROCEDURE — 3061F NEG MICROALBUMINURIA REV: CPT | Mod: CPTII,,, | Performed by: NURSE PRACTITIONER

## 2023-07-11 PROCEDURE — 3288F FALL RISK ASSESSMENT DOCD: CPT | Mod: CPTII,,, | Performed by: NURSE PRACTITIONER

## 2023-07-11 PROCEDURE — 3044F PR MOST RECENT HEMOGLOBIN A1C LEVEL <7.0%: ICD-10-PCS | Mod: CPTII,,, | Performed by: NURSE PRACTITIONER

## 2023-07-11 PROCEDURE — 1159F PR MEDICATION LIST DOCUMENTED IN MEDICAL RECORD: ICD-10-PCS | Mod: CPTII,,, | Performed by: NURSE PRACTITIONER

## 2023-07-11 PROCEDURE — 3066F PR DOCUMENTATION OF TREATMENT FOR NEPHROPATHY: ICD-10-PCS | Mod: CPTII,,, | Performed by: NURSE PRACTITIONER

## 2023-07-11 PROCEDURE — 1126F AMNT PAIN NOTED NONE PRSNT: CPT | Mod: CPTII,,, | Performed by: NURSE PRACTITIONER

## 2023-07-11 PROCEDURE — 4010F PR ACE/ARB THEARPY RXD/TAKEN: ICD-10-PCS | Mod: CPTII,,, | Performed by: NURSE PRACTITIONER

## 2023-07-11 PROCEDURE — 4010F ACE/ARB THERAPY RXD/TAKEN: CPT | Mod: CPTII,,, | Performed by: NURSE PRACTITIONER

## 2023-07-11 PROCEDURE — 1159F MED LIST DOCD IN RCRD: CPT | Mod: CPTII,,, | Performed by: NURSE PRACTITIONER

## 2023-07-11 PROCEDURE — 1101F PR PT FALLS ASSESS DOC 0-1 FALLS W/OUT INJ PAST YR: ICD-10-PCS | Mod: CPTII,,, | Performed by: NURSE PRACTITIONER

## 2023-07-11 PROCEDURE — 99214 PR OFFICE/OUTPT VISIT, EST, LEVL IV, 30-39 MIN: ICD-10-PCS | Mod: ,,, | Performed by: NURSE PRACTITIONER

## 2023-07-11 PROCEDURE — 3066F NEPHROPATHY DOC TX: CPT | Mod: CPTII,,, | Performed by: NURSE PRACTITIONER

## 2023-07-11 PROCEDURE — 1101F PT FALLS ASSESS-DOCD LE1/YR: CPT | Mod: CPTII,,, | Performed by: NURSE PRACTITIONER

## 2023-07-11 PROCEDURE — 3075F PR MOST RECENT SYSTOLIC BLOOD PRESS GE 130-139MM HG: ICD-10-PCS | Mod: CPTII,,, | Performed by: NURSE PRACTITIONER

## 2023-07-11 RX ORDER — GABAPENTIN 100 MG/1
100 CAPSULE ORAL DAILY
Qty: 90 CAPSULE | Refills: 3 | Status: SHIPPED | OUTPATIENT
Start: 2023-07-11 | End: 2023-10-16 | Stop reason: DRUGHIGH

## 2023-07-11 RX ORDER — HYDROCHLOROTHIAZIDE 12.5 MG/1
12.5 TABLET ORAL DAILY
Qty: 90 TABLET | Refills: 3 | Status: SHIPPED | OUTPATIENT
Start: 2023-07-11 | End: 2023-10-16 | Stop reason: CLARIF

## 2023-07-11 RX ORDER — QUINAPRIL 20 MG/1
20 TABLET ORAL NIGHTLY
Qty: 90 TABLET | Refills: 3 | Status: SHIPPED | OUTPATIENT
Start: 2023-07-11 | End: 2023-10-16 | Stop reason: RX

## 2023-07-11 RX ORDER — GABAPENTIN 400 MG/1
400 CAPSULE ORAL NIGHTLY
Qty: 90 CAPSULE | Refills: 3 | Status: SHIPPED | OUTPATIENT
Start: 2023-07-11

## 2023-07-11 NOTE — PATIENT INSTRUCTIONS
Stop Victoza  Slowly decrease insulin dose- start with 20 units in the morning 25 units in the evening, then decrease by 5 units each every 7-10 days.   Continue current Dose of Xigduo   Repeat A1c in 3 months and will make additional adjustments based on result

## 2023-07-11 NOTE — PROGRESS NOTES
Subjective:       Patient ID: Marifer Bernardo is a 73 y.o. female.    Chief Complaint: Annual Exam and Results (Patient would also like to review recent lab results while here in office today. )    Vimal Bernardo presents to the clinic today for routine follow up visit  PMH of Diabetes mellitus, hypertension, hyperlipidemia, morbid obesity, osteoarthritis. 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   She is under the care of Podiatrist Dr. Arreola in the past for recurrent diabetic foot ulcer- has not followed back up with him. Has been intermittently receiving foot care at local wound care clinic.      Refuses Mammogram, Colonoscopy- reports she will complete a DEXA      Diabetes:  Reports monitors 4 times per day with values ranging 70's-120's.  Would like to discuss stopping her Victoza due to cost of medication. Reports she has plenty of her Xigduo. Continues her insulin, but reports she has been experiencing lower blood sugar levels.   She is due for a diabetic eye exam with [facility] -  Dr. Kee. Last screen prior to COVID pandemic     Hypertension:  Patient reports she does monitor BP values every day around 5:30pm. Reports values are typically 120's/80's.  Reports she is needing her Quinapril prescription changed- needing the medication split as her mail order pharmacy is no longer able to obtain the combination        Review of Systems   Constitutional: Negative.    HENT: Negative.     Eyes:  Negative for pain and visual disturbance.   Respiratory:  Negative for chest tightness, shortness of breath and wheezing.    Cardiovascular:  Negative for chest pain, palpitations and leg swelling.   Gastrointestinal:  Negative for abdominal pain, constipation and diarrhea.   Endocrine: Negative.    Genitourinary:  Negative for difficulty urinating and vaginal pain.   Musculoskeletal:  Positive for arthralgias and gait problem.   Skin:  Negative for  wound.   Allergic/Immunologic: Negative.    Neurological:  Positive for numbness. Negative for dizziness and weakness.   Hematological: Negative.    Psychiatric/Behavioral: Negative.       Patient Active Problem List   Diagnosis    Osteoarthritis of right hip    Type 2 diabetes mellitus    Essential hypertension    Status post total replacement of right hip    Primary osteoarthritis of right knee    Osteoarthritis of ankle and foot    Unsteady gait    Peroneal tendonitis, unspecified laterality    Ingrown nail    Cellulitis of left lower extremity    Cellulitis of left leg    Morbid obesity    Carpal tunnel syndrome, bilateral    Cervical radiculopathy    Cervical spondylosis without myelopathy    Diabetic polyneuropathy    Long term current use of insulin    Lumbar radiculopathy    Mixed hyperlipidemia    Type II diabetes mellitus with neurological manifestations    Depressive disorder       Objective:      Physical Exam  Vitals and nursing note reviewed.   Constitutional:       General: She is not in acute distress.     Appearance: Normal appearance. She is obese.      Comments: Ambulation with Rolator    Eyes:      Conjunctiva/sclera: Conjunctivae normal.      Comments: Corrective lenses    Cardiovascular:      Rate and Rhythm: Normal rate and regular rhythm.      Heart sounds: Normal heart sounds. No murmur heard.  Pulmonary:      Effort: Pulmonary effort is normal. No respiratory distress.      Breath sounds: Normal breath sounds. No wheezing.   Musculoskeletal:         General: Normal range of motion.      Right lower leg: No edema.      Left lower leg: No edema.   Skin:     General: Skin is warm and dry.      Capillary Refill: Capillary refill takes less than 2 seconds.   Neurological:      General: No focal deficit present.      Mental Status: She is alert.   Psychiatric:         Attention and Perception: Attention and perception normal.         Mood and Affect: Mood normal.         Behavior: Behavior  normal.         Cognition and Memory: Cognition normal.       Lab Results   Component Value Date    WBC 6.28 07/22/2022    HGB 14.8 07/22/2022    HCT 46.8 07/22/2022     07/22/2022    CHOL 136 07/07/2023    TRIG 125 07/07/2023    HDL 44 07/07/2023    ALT 17 07/07/2023    AST 26 07/07/2023     07/07/2023    K 4.8 07/07/2023     07/07/2023    CREATININE 0.9 07/07/2023    BUN 38 (H) 07/07/2023    CO2 26 07/07/2023    HGBA1C 5.4 07/07/2023     The 10-year ASCVD risk score (Adrianna EUGENE, et al., 2019) is: 33.1%    Values used to calculate the score:      Age: 73 years      Sex: Female      Is Non- : No      Diabetic: Yes      Tobacco smoker: No      Systolic Blood Pressure: 138 mmHg      Is BP treated: Yes      HDL Cholesterol: 44 mg/dL      Total Cholesterol: 136 mg/dL  Visit Vitals  /76 (BP Location: Left arm, Patient Position: Sitting, BP Method: Large (Manual))   Pulse 76   Wt 111 kg (244 lb 11.4 oz)   SpO2 98%   BMI 42.00 kg/m²      Assessment:       1. Type 2 diabetes mellitus with diabetic polyneuropathy, with long-term current use of insulin    2. Mammogram declined    3. Essential hypertension    4. Lumbar radiculopathy    5. Other secondary osteoarthritis of right hip        Plan:       1. Type 2 diabetes mellitus with diabetic polyneuropathy, with long-term current use of insulin  -     Ambulatory referral/consult to Optometry; Future; Expected date: 07/18/2023  -     gabapentin (NEURONTIN) 400 MG capsule; Take 1 capsule (400 mg total) by mouth every evening.  Dispense: 90 capsule; Refill: 3  -     gabapentin (NEURONTIN) 100 MG capsule; Take 1 capsule (100 mg total) by mouth once daily.  Dispense: 90 capsule; Refill: 3  Stop Victoza  Slowly decrease insulin dose- start with 20 units in the morning 25 units in the evening, then decrease by 5 units each every 7-10 days.   Continue current Dose of Xigduo   Repeat A1c in 3 months and will make additional adjustments  based on result   2. Mammogram declined  Monthly self breast exams encouraged   3. Essential hypertension  -     quinapriL (ACCUPRIL) 20 MG tablet; Take 1 tablet (20 mg total) by mouth every evening.  Dispense: 90 tablet; Refill: 3  -     hydroCHLOROthiazide (HYDRODIURIL) 12.5 MG Tab; Take 1 tablet (12.5 mg total) by mouth once daily.  Dispense: 90 tablet; Refill: 3  The patient was counseled on HTN education, management and recommendations. The need for weight reduction was reinforced and a BMI goal of 19 to 25 was set.  Patient was encouraged to adhere to a low sodium diet and a DASH diet was recommended. Patient was also encouraged to engage in routine exercise such as walking most days of the week greater than 30 minutes. Patient education materials were provided to the patient for home review and further reinforcement of topics discussed.      4. Lumbar radiculopathy  Maintain physical activity   DME at all times- avoid throw/area rugs   Topicals encouraged- Biofreeze, Solanpas patches   Continue rx for Voltaren BID only with food   5. Other secondary osteoarthritis of right hip  Maintain physical activity   DME at all times- avoid throw/area rugs   Topicals encouraged- Biofreeze, Solanpas patches   Continue rx for Voltaren BID only with food      Follow up in about 3 months (around 10/11/2023).      Future Appointments       Date Provider Specialty Appt Notes    7/26/2023 Giacomo Cohn DPM Podiatry N[facility]    10/9/2023  Lab nonfasting    10/16/2023 Aura Pereyra NP Family Medicine 3 month follow up DM

## 2023-07-13 PROBLEM — E11.621 ULCER OF LEFT FOOT DUE TO TYPE 2 DIABETES MELLITUS: Status: RESOLVED | Noted: 2020-07-17 | Resolved: 2023-07-13

## 2023-07-13 PROBLEM — L97.529 ULCER OF LEFT FOOT DUE TO TYPE 2 DIABETES MELLITUS: Status: RESOLVED | Noted: 2020-07-17 | Resolved: 2023-07-13

## 2023-07-13 PROBLEM — L97.509 ULCER OF GREAT TOE: Status: RESOLVED | Noted: 2020-06-05 | Resolved: 2023-07-13

## 2023-07-13 PROBLEM — L97.522 SKIN ULCER OF LEFT FOOT WITH FAT LAYER EXPOSED: Status: RESOLVED | Noted: 2019-04-16 | Resolved: 2023-07-13

## 2023-07-26 ENCOUNTER — OFFICE VISIT (OUTPATIENT)
Dept: PODIATRY | Facility: CLINIC | Age: 73
End: 2023-07-26
Payer: MEDICARE

## 2023-07-26 VITALS — HEART RATE: 70 BPM | OXYGEN SATURATION: 98 %

## 2023-07-26 DIAGNOSIS — L60.9 DISEASE OF NAIL: ICD-10-CM

## 2023-07-26 DIAGNOSIS — L97.521 ULCER OF LEFT FOOT, LIMITED TO BREAKDOWN OF SKIN: ICD-10-CM

## 2023-07-26 DIAGNOSIS — L84 CORN OR CALLUS: ICD-10-CM

## 2023-07-26 DIAGNOSIS — E11.42 DIABETIC POLYNEUROPATHY ASSOCIATED WITH TYPE 2 DIABETES MELLITUS: Primary | ICD-10-CM

## 2023-07-26 PROCEDURE — 1159F PR MEDICATION LIST DOCUMENTED IN MEDICAL RECORD: ICD-10-PCS | Mod: CPTII,,, | Performed by: PODIATRIST

## 2023-07-26 PROCEDURE — 3061F PR NEG MICROALBUMINURIA RESULT DOCUMENTED/REVIEW: ICD-10-PCS | Mod: CPTII,,, | Performed by: PODIATRIST

## 2023-07-26 PROCEDURE — 4010F ACE/ARB THERAPY RXD/TAKEN: CPT | Mod: CPTII,,, | Performed by: PODIATRIST

## 2023-07-26 PROCEDURE — 1160F PR REVIEW ALL MEDS BY PRESCRIBER/CLIN PHARMACIST DOCUMENTED: ICD-10-PCS | Mod: CPTII,,, | Performed by: PODIATRIST

## 2023-07-26 PROCEDURE — 3044F PR MOST RECENT HEMOGLOBIN A1C LEVEL <7.0%: ICD-10-PCS | Mod: CPTII,,, | Performed by: PODIATRIST

## 2023-07-26 PROCEDURE — 11721 ROUTINE FOOT CARE: ICD-10-PCS | Mod: 59,Q9,, | Performed by: PODIATRIST

## 2023-07-26 PROCEDURE — 1101F PR PT FALLS ASSESS DOC 0-1 FALLS W/OUT INJ PAST YR: ICD-10-PCS | Mod: CPTII,,, | Performed by: PODIATRIST

## 2023-07-26 PROCEDURE — 3288F FALL RISK ASSESSMENT DOCD: CPT | Mod: CPTII,,, | Performed by: PODIATRIST

## 2023-07-26 PROCEDURE — 3044F HG A1C LEVEL LT 7.0%: CPT | Mod: CPTII,,, | Performed by: PODIATRIST

## 2023-07-26 PROCEDURE — 3066F NEPHROPATHY DOC TX: CPT | Mod: CPTII,,, | Performed by: PODIATRIST

## 2023-07-26 PROCEDURE — 11721 DEBRIDE NAIL 6 OR MORE: CPT | Mod: 59,Q9,, | Performed by: PODIATRIST

## 2023-07-26 PROCEDURE — 1126F PR PAIN SEVERITY QUANTIFIED, NO PAIN PRESENT: ICD-10-PCS | Mod: CPTII,,, | Performed by: PODIATRIST

## 2023-07-26 PROCEDURE — 97597 WOUND DEBRIDEMENT: ICD-10-PCS | Mod: ,,, | Performed by: PODIATRIST

## 2023-07-26 PROCEDURE — 4010F PR ACE/ARB THEARPY RXD/TAKEN: ICD-10-PCS | Mod: CPTII,,, | Performed by: PODIATRIST

## 2023-07-26 PROCEDURE — 3288F PR FALLS RISK ASSESSMENT DOCUMENTED: ICD-10-PCS | Mod: CPTII,,, | Performed by: PODIATRIST

## 2023-07-26 PROCEDURE — 1101F PT FALLS ASSESS-DOCD LE1/YR: CPT | Mod: CPTII,,, | Performed by: PODIATRIST

## 2023-07-26 PROCEDURE — 99213 OFFICE O/P EST LOW 20 MIN: CPT | Mod: 25,,, | Performed by: PODIATRIST

## 2023-07-26 PROCEDURE — 1160F RVW MEDS BY RX/DR IN RCRD: CPT | Mod: CPTII,,, | Performed by: PODIATRIST

## 2023-07-26 PROCEDURE — 97597 DBRDMT OPN WND 1ST 20 CM/<: CPT | Mod: ,,, | Performed by: PODIATRIST

## 2023-07-26 PROCEDURE — 1159F MED LIST DOCD IN RCRD: CPT | Mod: CPTII,,, | Performed by: PODIATRIST

## 2023-07-26 PROCEDURE — 99213 PR OFFICE/OUTPT VISIT, EST, LEVL III, 20-29 MIN: ICD-10-PCS | Mod: 25,,, | Performed by: PODIATRIST

## 2023-07-26 PROCEDURE — 3066F PR DOCUMENTATION OF TREATMENT FOR NEPHROPATHY: ICD-10-PCS | Mod: CPTII,,, | Performed by: PODIATRIST

## 2023-07-26 PROCEDURE — 1126F AMNT PAIN NOTED NONE PRSNT: CPT | Mod: CPTII,,, | Performed by: PODIATRIST

## 2023-07-26 PROCEDURE — 3061F NEG MICROALBUMINURIA REV: CPT | Mod: CPTII,,, | Performed by: PODIATRIST

## 2023-07-26 RX ORDER — QUINAPRIL HYDROCHLORIDE AND HYDROCHLOROTHIAZIDE 20; 12.5 MG/1; MG/1
1 TABLET, FILM COATED ORAL DAILY
COMMUNITY
End: 2023-08-14 | Stop reason: CLARIF

## 2023-07-26 RX ORDER — LIRAGLUTIDE 6 MG/ML
1.8 INJECTION SUBCUTANEOUS DAILY
COMMUNITY
End: 2023-10-16

## 2023-07-26 RX ORDER — OMEGA-3-ACID ETHYL ESTERS 1 G/1
1 CAPSULE, LIQUID FILLED ORAL 2 TIMES DAILY
COMMUNITY
Start: 2023-07-23 | End: 2023-10-16

## 2023-08-05 NOTE — PROGRESS NOTES
"Subjective:     Patient ID: Marifer Bernardo is a 73 y.o. female.    Chief Complaint: Diabetes, Nail Care, Wound Check (Patient reports she has had off and on issues with a wound area on the bottom of her left foot over recent years. Patient reports that the area will "break open" periodically. She states it has recently "broken open" within the last month. Patient is experiencing drainage from wound area. Patient denies pain at wound site at this time. ), Foot Pain (Patient reports that she does experience off and on pain in the bottom of her left foot. She advises this pain has been an issue for a couple of years but is exacerbated with walking. Patient denies any pain currently but reports it has been an issue recently. ), and Nail Problem (Patient has concerns regarding her right foot great toe nail - she is concerned that this may be potentially ingrown. )    Marifer is a 73 y.o. female who presents to the clinic for evaluation and treatment of high risk feet. Marifer has a past medical history of Arthritis, Diabetes mellitus, type 2, Encounter for blood transfusion, Hypertension, PONV (postoperative nausea and vomiting), and Wears glasses. The patient's chief complaint is diabetic foot ulcer, left 1st MPJ. This patient has documented high risk feet requiring routine maintenance secondary to diabetes mellitis and those secondary complications of diabetes, as mentioned..    PCP: Aura Pereyra NP    Date Last Seen by PCP: 07/11/2023    Current shoe gear:  Affected Foot: Casual shoes     Unaffected Foot: Casual shoes    History of Trauma: negative  Sign of Infection: none    Hemoglobin A1C   Date Value Ref Range Status   07/07/2023 5.4 4.0 - 5.6 % Final     Comment:     ADA Screening Guidelines:  5.7-6.4%  Consistent with prediabetes  >or=6.5%  Consistent with diabetes    High levels of fetal hemoglobin interfere with the HbA1C  assay. Heterozygous hemoglobin variants (HbS, HgC, etc)do  not significantly interfere " with this assay.   However, presence of multiple variants may affect accuracy.     08/30/2022 5.7 (A) 5.6 % Final   07/22/2022 5.8 (H) 4.0 - 5.6 % Final     Comment:     ADA Screening Guidelines:  5.7-6.4%  Consistent with prediabetes  >or=6.5%  Consistent with diabetes    High levels of fetal hemoglobin interfere with the HbA1C  assay. Heterozygous hemoglobin variants (HbS, HgC, etc)do  not significantly interfere with this assay.   However, presence of multiple variants may affect accuracy.     09/17/2021 5.8 (H) 4.0 - 5.6 % Final     Comment:     ADA Screening Guidelines:  5.7-6.4%  Consistent with prediabetes  >or=6.5%  Consistent with diabetes    High levels of fetal hemoglobin interfere with the HbA1C  assay. Heterozygous hemoglobin variants (HbS, HgC, etc)do  not significantly interfere with this assay.   However, presence of multiple variants may affect accuracy.         Review of Systems   Constitutional: Negative for chills and fever.   Cardiovascular:  Negative for chest pain and leg swelling.   Respiratory:  Negative for cough and shortness of breath.    Gastrointestinal:  Negative for diarrhea, nausea and vomiting.   Neurological:  Positive for paresthesias.        Objective:     Physical Exam  Vitals reviewed.   Constitutional:       General: She is not in acute distress.     Appearance: Normal appearance. She is not ill-appearing.   HENT:      Nose: Nose normal.   Cardiovascular:      Rate and Rhythm: Normal rate.   Pulmonary:      Effort: Pulmonary effort is normal. No respiratory distress.   Skin:     Capillary Refill: Capillary refill takes 2 to 3 seconds.   Neurological:      Mental Status: She is alert and oriented to person, place, and time.   Psychiatric:         Mood and Affect: Mood normal.         Behavior: Behavior normal.         Thought Content: Thought content normal.         Judgment: Judgment normal.       Dermatologic:  Diabetic ulceration noted to plantar aspect left 1st digit MPJ  with no active drainage no signs of infection wound measures roughly 0.5 x 0.6 x 0.1 cm, elongated nails 1 through 5 bilateral foot, no rashes noted bilateral foot, no interdigital maceration noted bilateral foot, right 1st digit nail plate is excessively elongated but shows no acute signs of infection or ingrown status.  Nails 1 through 5 bilateral are mildly thickened, discolored and elongated  Neurologic:  Protective and light touch sensation intact bilateral lower extremity, positive paresthesias reported  Musculoskeletal:  5/5 muscle strength noted bilateral foot, ankle joint range of motion is decreased without pain, no pain and tenderness with palpation of right 1st digit or plantar left 1st MPJ wound  Vascular:  DP and PT pulse palpable 1/4 bilateral foot, capillary fill time less than 3 seconds to distal aspect of the digits bilateral foot, pedal hair growth is absent to the digits bilateral foot, atrophic soft tissue skin changes noted to the bilateral lower extremity, hyperpigmentation noted to the bilateral lower 1/3 of the leg, edema noted to the bilateral foot and ankle    Assessment:      Encounter Diagnoses   Name Primary?    Diabetic polyneuropathy associated with type 2 diabetes mellitus Yes    [place] or callus     Ulcer of left foot, limited to breakdown of skin     Disease of nail      Plan:     Marifer was seen today for diabetes, nail care, wound check, foot pain and nail problem.    Diagnoses and all orders for this visit:    Diabetic polyneuropathy associated with type 2 diabetes mellitus  -     Routine Foot Care    [place] or callus    Ulcer of left foot, limited to breakdown of skin  -     Wound Debridement    Disease of nail  -     Routine Foot Care      I counseled the patient on her conditions, their implications and medical management.        1. Patient was examined and evaluated.    2. Discussed patient presence of diabetic ulcer plantar aspect of the left foot.  Patient was advised to  continue with daily dressing changes to the area.  Patient was advised monitor the amount of moisture allowed to the area.  Patient was dispensed offloading pads for reduction of direct pressure to the wound.  Wound Debridement    Date/Time: 7/26/2023 1:00 PM    Performed by: Giacomo Cohn DPM  Authorized by: Giacomo Cohn DPM    Consent Done?:  Yes (Verbal)    Wound Details:    Location:  Left foot    Location:  Left 1st Metatarsal Head       Length (cm):  0.5       Area (sq cm):  0.3       Width (cm):  0.6       Percent Debrided (%):  100       Depth (cm):  0.1       Total Area Debrided (sq cm):  0.3    Depth of debridement:  Epidermis/Dermis    Devitalized tissue debrided:  Biofilm and Callus    Instruments:  Blade  Bleeding:  Minimal  Hemostasis Achieved: Yes  Method Used:  Pressure  Patient tolerance:  Patient tolerated the procedure well with no immediate complications  1st Wound Pain Assessment: 0  Routine Foot Care    Date/Time: 7/26/2023 1:00 PM    Performed by: Giacomo Cohn DPM  Authorized by: Giacomo Cohn DPM    Consent Done?:  Yes (Verbal)    Nail Care Type:  Debride  Location(s): All  (Left 1st Toe, Left 3rd Toe, Left 2nd Toe, Left 4th Toe, Left 5th Toe, Right 1st Toe, Right 2nd Toe, Right 3rd Toe, Right 4th Toe and Right 5th Toe)  Patient tolerance:  Patient tolerated the procedure well with no immediate complications      3. Patient will continue with daily foot monitoring.  Patient will continue efforts proper glycemic control, lowering hemoglobin A1c, and adherence to diabetic medication regimen.    4. Patient was advised to decrease the amount of barefoot walking and limit the use of slides and flip-flops fear patient was advised to continue with comfortable shoe gear both inside and outside of the home.  5. Discussed with patient etiology of fungal nail changes.  Discussed risks and benefits oral Lamisil therapy.  Discussed OTC topical conservative treatments such as  Vicks vapor rub and tea tree oil.  6. Patient will follow-up in 2 weeks or p.r.n. for complaints

## 2023-08-07 ENCOUNTER — OFFICE VISIT (OUTPATIENT)
Dept: FAMILY MEDICINE | Facility: CLINIC | Age: 73
End: 2023-08-07
Payer: MEDICARE

## 2023-08-07 VITALS
BODY MASS INDEX: 40.8 KG/M2 | WEIGHT: 239 LBS | OXYGEN SATURATION: 95 % | RESPIRATION RATE: 18 BRPM | SYSTOLIC BLOOD PRESSURE: 130 MMHG | DIASTOLIC BLOOD PRESSURE: 62 MMHG | HEIGHT: 64 IN | HEART RATE: 83 BPM

## 2023-08-07 DIAGNOSIS — E11.42 TYPE 2 DIABETES MELLITUS WITH DIABETIC POLYNEUROPATHY, WITH LONG-TERM CURRENT USE OF INSULIN: ICD-10-CM

## 2023-08-07 DIAGNOSIS — E66.01 OBESITY, CLASS III, BMI 40-49.9 (MORBID OBESITY): ICD-10-CM

## 2023-08-07 DIAGNOSIS — L03.116 CELLULITIS OF LEFT LEG: Primary | ICD-10-CM

## 2023-08-07 DIAGNOSIS — E11.59 HYPERTENSION ASSOCIATED WITH DIABETES: ICD-10-CM

## 2023-08-07 DIAGNOSIS — E11.42 DIABETIC POLYNEUROPATHY ASSOCIATED WITH TYPE 2 DIABETES MELLITUS: ICD-10-CM

## 2023-08-07 DIAGNOSIS — I15.2 HYPERTENSION ASSOCIATED WITH DIABETES: ICD-10-CM

## 2023-08-07 DIAGNOSIS — Z79.4 TYPE 2 DIABETES MELLITUS WITH DIABETIC POLYNEUROPATHY, WITH LONG-TERM CURRENT USE OF INSULIN: ICD-10-CM

## 2023-08-07 PROCEDURE — 3061F NEG MICROALBUMINURIA REV: CPT | Mod: CPTII,,, | Performed by: FAMILY MEDICINE

## 2023-08-07 PROCEDURE — 4010F ACE/ARB THERAPY RXD/TAKEN: CPT | Mod: CPTII,,, | Performed by: FAMILY MEDICINE

## 2023-08-07 PROCEDURE — 1101F PT FALLS ASSESS-DOCD LE1/YR: CPT | Mod: CPTII,,, | Performed by: FAMILY MEDICINE

## 2023-08-07 PROCEDURE — 3075F SYST BP GE 130 - 139MM HG: CPT | Mod: CPTII,,, | Performed by: FAMILY MEDICINE

## 2023-08-07 PROCEDURE — 3008F BODY MASS INDEX DOCD: CPT | Mod: CPTII,,, | Performed by: FAMILY MEDICINE

## 2023-08-07 PROCEDURE — 1125F PR PAIN SEVERITY QUANTIFIED, PAIN PRESENT: ICD-10-PCS | Mod: CPTII,,, | Performed by: FAMILY MEDICINE

## 2023-08-07 PROCEDURE — 3288F PR FALLS RISK ASSESSMENT DOCUMENTED: ICD-10-PCS | Mod: CPTII,,, | Performed by: FAMILY MEDICINE

## 2023-08-07 PROCEDURE — 96372 PR INJECTION,THERAP/PROPH/DIAG2ST, IM OR SUBCUT: ICD-10-PCS | Mod: ,,, | Performed by: FAMILY MEDICINE

## 2023-08-07 PROCEDURE — 3078F PR MOST RECENT DIASTOLIC BLOOD PRESSURE < 80 MM HG: ICD-10-PCS | Mod: CPTII,,, | Performed by: FAMILY MEDICINE

## 2023-08-07 PROCEDURE — 3288F FALL RISK ASSESSMENT DOCD: CPT | Mod: CPTII,,, | Performed by: FAMILY MEDICINE

## 2023-08-07 PROCEDURE — 3044F PR MOST RECENT HEMOGLOBIN A1C LEVEL <7.0%: ICD-10-PCS | Mod: CPTII,,, | Performed by: FAMILY MEDICINE

## 2023-08-07 PROCEDURE — 3061F PR NEG MICROALBUMINURIA RESULT DOCUMENTED/REVIEW: ICD-10-PCS | Mod: CPTII,,, | Performed by: FAMILY MEDICINE

## 2023-08-07 PROCEDURE — 3075F PR MOST RECENT SYSTOLIC BLOOD PRESS GE 130-139MM HG: ICD-10-PCS | Mod: CPTII,,, | Performed by: FAMILY MEDICINE

## 2023-08-07 PROCEDURE — 3044F HG A1C LEVEL LT 7.0%: CPT | Mod: CPTII,,, | Performed by: FAMILY MEDICINE

## 2023-08-07 PROCEDURE — 3078F DIAST BP <80 MM HG: CPT | Mod: CPTII,,, | Performed by: FAMILY MEDICINE

## 2023-08-07 PROCEDURE — 1159F MED LIST DOCD IN RCRD: CPT | Mod: CPTII,,, | Performed by: FAMILY MEDICINE

## 2023-08-07 PROCEDURE — 4010F PR ACE/ARB THEARPY RXD/TAKEN: ICD-10-PCS | Mod: CPTII,,, | Performed by: FAMILY MEDICINE

## 2023-08-07 PROCEDURE — 3008F PR BODY MASS INDEX (BMI) DOCUMENTED: ICD-10-PCS | Mod: CPTII,,, | Performed by: FAMILY MEDICINE

## 2023-08-07 PROCEDURE — 1101F PR PT FALLS ASSESS DOC 0-1 FALLS W/OUT INJ PAST YR: ICD-10-PCS | Mod: CPTII,,, | Performed by: FAMILY MEDICINE

## 2023-08-07 PROCEDURE — 1160F RVW MEDS BY RX/DR IN RCRD: CPT | Mod: CPTII,,, | Performed by: FAMILY MEDICINE

## 2023-08-07 PROCEDURE — 96372 THER/PROPH/DIAG INJ SC/IM: CPT | Mod: ,,, | Performed by: FAMILY MEDICINE

## 2023-08-07 PROCEDURE — 99214 OFFICE O/P EST MOD 30 MIN: CPT | Mod: 25,,, | Performed by: FAMILY MEDICINE

## 2023-08-07 PROCEDURE — 3066F PR DOCUMENTATION OF TREATMENT FOR NEPHROPATHY: ICD-10-PCS | Mod: CPTII,,, | Performed by: FAMILY MEDICINE

## 2023-08-07 PROCEDURE — 1159F PR MEDICATION LIST DOCUMENTED IN MEDICAL RECORD: ICD-10-PCS | Mod: CPTII,,, | Performed by: FAMILY MEDICINE

## 2023-08-07 PROCEDURE — 1125F AMNT PAIN NOTED PAIN PRSNT: CPT | Mod: CPTII,,, | Performed by: FAMILY MEDICINE

## 2023-08-07 PROCEDURE — 3066F NEPHROPATHY DOC TX: CPT | Mod: CPTII,,, | Performed by: FAMILY MEDICINE

## 2023-08-07 PROCEDURE — 99214 PR OFFICE/OUTPT VISIT, EST, LEVL IV, 30-39 MIN: ICD-10-PCS | Mod: 25,,, | Performed by: FAMILY MEDICINE

## 2023-08-07 PROCEDURE — 1160F PR REVIEW ALL MEDS BY PRESCRIBER/CLIN PHARMACIST DOCUMENTED: ICD-10-PCS | Mod: CPTII,,, | Performed by: FAMILY MEDICINE

## 2023-08-07 RX ORDER — DEXAMETHASONE SODIUM PHOSPHATE 4 MG/ML
4 INJECTION, SOLUTION INTRA-ARTICULAR; INTRALESIONAL; INTRAMUSCULAR; INTRAVENOUS; SOFT TISSUE ONCE
Status: COMPLETED | OUTPATIENT
Start: 2023-08-07 | End: 2023-08-07

## 2023-08-07 RX ORDER — SULFAMETHOXAZOLE AND TRIMETHOPRIM 800; 160 MG/1; MG/1
1 TABLET ORAL 2 TIMES DAILY
Qty: 14 TABLET | Refills: 0 | Status: SHIPPED | OUTPATIENT
Start: 2023-08-07 | End: 2023-08-14

## 2023-08-07 RX ADMIN — DEXAMETHASONE SODIUM PHOSPHATE 4 MG: 4 INJECTION, SOLUTION INTRA-ARTICULAR; INTRALESIONAL; INTRAMUSCULAR; INTRAVENOUS; SOFT TISSUE at 10:08

## 2023-08-07 NOTE — PROGRESS NOTES
Subjective:       Patient ID: Marifer Bernardo is a 73 y.o. female.    Chief Complaint: Leg Pain (Pt c/o left leg pain and redness and swelling. Pt states she noticed the swelling Thursday/Friday last week) and Back Pain    This patient is new to me.  Marifer Bernardo presents to the clinic today with complaints of leg, hip and back pain. Patient states Thursday she started with a bad headache and nausea. Patient reports she stayed in bed Friday but is now having back and hip pain. Patient states the main reason she is here is the pain in her back and hips are off the charts. Patient states she noticed the redness to her leg Thursday as well and thought that may be what started all this. Patient states she has a wound on the bottom of her left foot she has been seeing podiatry for.   Patient educated on plan of care, verbalized understanding.       Leg Pain   The incident occurred 3 to 5 days ago. There was no injury mechanism. The pain is present in the left leg.   Back Pain  This is a recurrent problem. The problem occurs intermittently. The pain is present in the lumbar spine. The quality of the pain is described as aching. The pain is severe. The symptoms are aggravated by lying down and sitting. Associated symptoms include leg pain. Pertinent negatives include no abdominal pain, chest pain, dysuria or fever.     Review of Systems   Constitutional:  Negative for activity change, appetite change, chills, diaphoresis and fever.   HENT:  Negative for congestion, ear pain, postnasal drip, sinus pressure, sneezing and sore throat.    Eyes:  Negative for pain, discharge, redness and itching.   Respiratory:  Negative for apnea, cough, chest tightness, shortness of breath and wheezing.    Cardiovascular:  Negative for chest pain and leg swelling.   Gastrointestinal:  Negative for abdominal distention, abdominal pain, constipation, diarrhea, nausea and vomiting.   Genitourinary:  Negative for difficulty urinating, dysuria,  flank pain and frequency.   Musculoskeletal:  Positive for arthralgias and back pain.   Skin:  Positive for color change. Negative for rash and wound.   Neurological:  Negative for dizziness.       Patient Active Problem List   Diagnosis    Osteoarthritis of right hip    Type 2 diabetes mellitus    Hypertension associated with diabetes    Status post total replacement of right hip    Primary osteoarthritis of right knee    Osteoarthritis of ankle and foot    Unsteady gait    Peroneal tendonitis, unspecified laterality    Ingrown nail    Cellulitis of left lower extremity    Cellulitis of left leg    Obesity, Class III, BMI 40-49.9 (morbid obesity)    Carpal tunnel syndrome, bilateral    Cervical radiculopathy    Cervical spondylosis without myelopathy    Diabetic polyneuropathy    Long term current use of insulin    Lumbar radiculopathy    Mixed hyperlipidemia    Type II diabetes mellitus with neurological manifestations    Depressive disorder       Objective:      Physical Exam  Vitals reviewed.   Constitutional:       General: She is not in acute distress.     Appearance: Normal appearance. She is well-developed.   HENT:      Head: Normocephalic.      Nose: Nose normal.   Eyes:      Conjunctiva/sclera: Conjunctivae normal.      Pupils: Pupils are equal, round, and reactive to light.   Cardiovascular:      Rate and Rhythm: Normal rate.   Pulmonary:      Effort: Pulmonary effort is normal. No respiratory distress.   Musculoskeletal:      Cervical back: Normal range of motion and neck supple.   Skin:     General: Skin is warm and dry.      Findings: Erythema and wound present. No rash. Petechiae: to bottom of foot that is being addressed by podiatry.  Neurological:      Mental Status: She is alert and oriented to person, place, and time.   Psychiatric:         Mood and Affect: Mood normal.         Behavior: Behavior normal.           Lab Results   Component Value Date    WBC 6.28 07/22/2022    HGB 14.8 07/22/2022     "HCT 46.8 07/22/2022     07/22/2022    CHOL 136 07/07/2023    TRIG 125 07/07/2023    HDL 44 07/07/2023    ALT 17 07/07/2023    AST 26 07/07/2023     07/07/2023    K 4.8 07/07/2023     07/07/2023    CREATININE 0.9 07/07/2023    BUN 38 (H) 07/07/2023    CO2 26 07/07/2023    HGBA1C 5.4 07/07/2023     The 10-year ASCVD risk score (Adrianna EUGENE, et al., 2019) is: 30%    Values used to calculate the score:      Age: 73 years      Sex: Female      Is Non- : No      Diabetic: Yes      Tobacco smoker: No      Systolic Blood Pressure: 130 mmHg      Is BP treated: Yes      HDL Cholesterol: 44 mg/dL      Total Cholesterol: 136 mg/dL  Visit Vitals  /62 (BP Location: Left arm, Patient Position: Sitting, BP Method: Medium (Manual))   Pulse 83   Resp 18   Ht 5' 4" (1.626 m)   Wt 108.4 kg (238 lb 15.7 oz)   SpO2 95%   BMI 41.02 kg/m²      Assessment:       1. Cellulitis of left leg    2. Type 2 diabetes mellitus with diabetic polyneuropathy, with long-term current use of insulin    3. Hypertension associated with diabetes    4. Diabetic polyneuropathy associated with type 2 diabetes mellitus    5. Obesity, Class III, BMI 40-49.9 (morbid obesity)        Plan:       Marifer was seen today for leg pain and back pain.    Diagnoses and all orders for this visit:    Cellulitis of left leg  -     sulfamethoxazole-trimethoprim 800-160mg (BACTRIM DS) 800-160 mg Tab; Take 1 tablet by mouth 2 (two) times daily. for 7 days    Type 2 diabetes mellitus with diabetic polyneuropathy, with long-term current use of insulin / Diabetic polyneuropathy associated with type 2 diabetes mellitus  -     dexAMETHasone injection 4 mg  Continue medications as prescribed.  Follow up with PCP     Hypertension associated with diabetes  Stable  Continue medications as prescribed.  Follow up with PCP       Obesity, Class III, BMI 40-49.9 (morbid obesity)  Body mass index is 41.02 kg/m².  Continue healthy diet and regular " exercise as tolerated.  Continue medications as prescribed.  Follow up with PCP      Follow up if symptoms worsen or fail to improve.      Future Appointments       Date Provider Specialty Appt Notes    10/9/2023  Lab nonfasting    10/16/2023 Aura Pereyra NP Family Medicine 3 month follow up DM              Tests to Keep You Healthy    Eye Exam: DUE  Last Blood Pressure <= 139/89 (8/7/2023): Yes  Last HbA1c < 8 (07/07/2023): Yes

## 2023-08-14 ENCOUNTER — OFFICE VISIT (OUTPATIENT)
Dept: FAMILY MEDICINE | Facility: CLINIC | Age: 73
End: 2023-08-14
Payer: MEDICARE

## 2023-08-14 VITALS
OXYGEN SATURATION: 98 % | BODY MASS INDEX: 40.87 KG/M2 | HEART RATE: 76 BPM | DIASTOLIC BLOOD PRESSURE: 72 MMHG | SYSTOLIC BLOOD PRESSURE: 118 MMHG | WEIGHT: 238.13 LBS

## 2023-08-14 DIAGNOSIS — R11.2 DRUG-INDUCED NAUSEA AND VOMITING: ICD-10-CM

## 2023-08-14 DIAGNOSIS — M47.816 LUMBAR SPONDYLOSIS: ICD-10-CM

## 2023-08-14 DIAGNOSIS — T50.905A DRUG-INDUCED NAUSEA AND VOMITING: ICD-10-CM

## 2023-08-14 DIAGNOSIS — Z74.09 PAIN RELATED TO IMPAIRED PHYSICAL MOBILITY: ICD-10-CM

## 2023-08-14 DIAGNOSIS — R52 PAIN RELATED TO IMPAIRED PHYSICAL MOBILITY: ICD-10-CM

## 2023-08-14 DIAGNOSIS — M51.36 DDD (DEGENERATIVE DISC DISEASE), LUMBAR: Primary | ICD-10-CM

## 2023-08-14 DIAGNOSIS — E11.49 TYPE II DIABETES MELLITUS WITH NEUROLOGICAL MANIFESTATIONS: ICD-10-CM

## 2023-08-14 PROCEDURE — 1101F PR PT FALLS ASSESS DOC 0-1 FALLS W/OUT INJ PAST YR: ICD-10-PCS | Mod: CPTII,,, | Performed by: NURSE PRACTITIONER

## 2023-08-14 PROCEDURE — 3044F HG A1C LEVEL LT 7.0%: CPT | Mod: CPTII,,, | Performed by: NURSE PRACTITIONER

## 2023-08-14 PROCEDURE — 3061F NEG MICROALBUMINURIA REV: CPT | Mod: CPTII,,, | Performed by: NURSE PRACTITIONER

## 2023-08-14 PROCEDURE — 1159F PR MEDICATION LIST DOCUMENTED IN MEDICAL RECORD: ICD-10-PCS | Mod: CPTII,,, | Performed by: NURSE PRACTITIONER

## 2023-08-14 PROCEDURE — 4010F ACE/ARB THERAPY RXD/TAKEN: CPT | Mod: CPTII,,, | Performed by: NURSE PRACTITIONER

## 2023-08-14 PROCEDURE — 4010F PR ACE/ARB THEARPY RXD/TAKEN: ICD-10-PCS | Mod: CPTII,,, | Performed by: NURSE PRACTITIONER

## 2023-08-14 PROCEDURE — 3288F PR FALLS RISK ASSESSMENT DOCUMENTED: ICD-10-PCS | Mod: CPTII,,, | Performed by: NURSE PRACTITIONER

## 2023-08-14 PROCEDURE — 3078F DIAST BP <80 MM HG: CPT | Mod: CPTII,,, | Performed by: NURSE PRACTITIONER

## 2023-08-14 PROCEDURE — 3008F PR BODY MASS INDEX (BMI) DOCUMENTED: ICD-10-PCS | Mod: CPTII,,, | Performed by: NURSE PRACTITIONER

## 2023-08-14 PROCEDURE — 1125F PR PAIN SEVERITY QUANTIFIED, PAIN PRESENT: ICD-10-PCS | Mod: CPTII,,, | Performed by: NURSE PRACTITIONER

## 2023-08-14 PROCEDURE — 99214 PR OFFICE/OUTPT VISIT, EST, LEVL IV, 30-39 MIN: ICD-10-PCS | Mod: ,,, | Performed by: NURSE PRACTITIONER

## 2023-08-14 PROCEDURE — 3078F PR MOST RECENT DIASTOLIC BLOOD PRESSURE < 80 MM HG: ICD-10-PCS | Mod: CPTII,,, | Performed by: NURSE PRACTITIONER

## 2023-08-14 PROCEDURE — 1125F AMNT PAIN NOTED PAIN PRSNT: CPT | Mod: CPTII,,, | Performed by: NURSE PRACTITIONER

## 2023-08-14 PROCEDURE — 99214 OFFICE O/P EST MOD 30 MIN: CPT | Mod: ,,, | Performed by: NURSE PRACTITIONER

## 2023-08-14 PROCEDURE — 3061F PR NEG MICROALBUMINURIA RESULT DOCUMENTED/REVIEW: ICD-10-PCS | Mod: CPTII,,, | Performed by: NURSE PRACTITIONER

## 2023-08-14 PROCEDURE — 1101F PT FALLS ASSESS-DOCD LE1/YR: CPT | Mod: CPTII,,, | Performed by: NURSE PRACTITIONER

## 2023-08-14 PROCEDURE — 1159F MED LIST DOCD IN RCRD: CPT | Mod: CPTII,,, | Performed by: NURSE PRACTITIONER

## 2023-08-14 PROCEDURE — 3066F NEPHROPATHY DOC TX: CPT | Mod: CPTII,,, | Performed by: NURSE PRACTITIONER

## 2023-08-14 PROCEDURE — 1160F RVW MEDS BY RX/DR IN RCRD: CPT | Mod: CPTII,,, | Performed by: NURSE PRACTITIONER

## 2023-08-14 PROCEDURE — 3066F PR DOCUMENTATION OF TREATMENT FOR NEPHROPATHY: ICD-10-PCS | Mod: CPTII,,, | Performed by: NURSE PRACTITIONER

## 2023-08-14 PROCEDURE — 3288F FALL RISK ASSESSMENT DOCD: CPT | Mod: CPTII,,, | Performed by: NURSE PRACTITIONER

## 2023-08-14 PROCEDURE — 3008F BODY MASS INDEX DOCD: CPT | Mod: CPTII,,, | Performed by: NURSE PRACTITIONER

## 2023-08-14 PROCEDURE — 1160F PR REVIEW ALL MEDS BY PRESCRIBER/CLIN PHARMACIST DOCUMENTED: ICD-10-PCS | Mod: CPTII,,, | Performed by: NURSE PRACTITIONER

## 2023-08-14 PROCEDURE — 3074F SYST BP LT 130 MM HG: CPT | Mod: CPTII,,, | Performed by: NURSE PRACTITIONER

## 2023-08-14 PROCEDURE — 3044F PR MOST RECENT HEMOGLOBIN A1C LEVEL <7.0%: ICD-10-PCS | Mod: CPTII,,, | Performed by: NURSE PRACTITIONER

## 2023-08-14 PROCEDURE — 3074F PR MOST RECENT SYSTOLIC BLOOD PRESSURE < 130 MM HG: ICD-10-PCS | Mod: CPTII,,, | Performed by: NURSE PRACTITIONER

## 2023-08-14 RX ORDER — METHOCARBAMOL 500 MG/1
500 TABLET, FILM COATED ORAL 4 TIMES DAILY PRN
Qty: 40 TABLET | Refills: 0 | Status: SHIPPED | OUTPATIENT
Start: 2023-08-14 | End: 2023-08-24

## 2023-08-14 RX ORDER — MELOXICAM 15 MG/1
15 TABLET ORAL DAILY
COMMUNITY
Start: 2023-08-09 | End: 2023-08-14 | Stop reason: SINTOL

## 2023-08-14 RX ORDER — TRAMADOL HYDROCHLORIDE 50 MG/1
1 TABLET ORAL DAILY
COMMUNITY
Start: 2023-08-09 | End: 2023-10-16

## 2023-08-14 RX ORDER — ONDANSETRON HYDROCHLORIDE 8 MG/1
8 TABLET, FILM COATED ORAL EVERY 8 HOURS PRN
Qty: 30 TABLET | Refills: 0 | Status: SHIPPED | OUTPATIENT
Start: 2023-08-14 | End: 2023-10-16

## 2023-08-14 NOTE — PROGRESS NOTES
"Subjective:       Patient ID: Marifer Bernardo is a 73 y.o. female.    Chief Complaint: Low-back Pain (Patient c/o lower back pain that she reports has been ongoing but worsened quickly around 8/9/23. Patient reports she actually went to ER for this on 8/9/23 and was told based upon imaging she needed to see "bone and joint dr". Patient reports pain to be 10/10 on pain scale currently. Patient reports that she has been having to sleep sitting up in chair/recliner due to lower back pain. )    Marifer Bernardo presents to the clinic today for ER follow up for continued low back pain.   Low-back Pain:  Patient c/o lower back pain that she reports has been ongoing but worsened quickly around 8/9/23. Patient reports she actually went to ER for this on 8/9/23 and was told based upon imaging she needed to see "bone and joint dr". Patient reports pain to be 10/10 on pain scale currently. Patient reports that she has been having to sleep sitting up in chair/recliner due to lower back pain.   Attempted to take the medications prescribed in the ER, but reports after 1 dose she experienced GI upset/intolerance and has not taken any further doses.   Would like to discuss additional treatment options.  Does report having an epidural injection in the past that provided slight relief, not interested in surgical options, but open to procedures.     Presented to the ER on 8/9/2023   Chief Complaint   Patient presents with   · Back Pain   Patient c/o lower back pain which started sometime last week , she is having difficulty laying down , when she does the pain radiates to both hips and down both legs.     HPI  Marifer Bernardo is a 73 y.o. female who presents with complaint of lower back pain that started approximately 1 to 2 weeks ago. Patient states that she is having pain while laying down. Occasional radiation but states that she does not have radiation at this time. Positive history of diabetes, hyperlipidemia, hypertension. Patient " was recently seen by Dr. Thurston and was given a steroid shot in her right hip secondary to the pain. Patient denies any nausea, vomiting, urinary complaints. Positive history of neuropathy. Patient is currently on gabapentin for her neuropathy. Denies any other complaints including any complaints in regards to her bowel and bladder.   X-ray L-spine - AP & lateral    Result Date: 8/9/2023  Lumbar spine, 3 views HISTORY: Back pain COMPARISON: 3/27/2018 FINDINGS: There is levocurvature of the lumbar spine centered at L2. There is disc space narrowing at multiple levels, most notably L2-L3, L3-4 and L5-S1. There appears to be grade 2 anterolisthesis of and L5 on S1. No acute fracture, dislocation, destructive osseous lesion or compression deformity is seen. Multilevel anterior vertebral body osteophyte formation and facet arthropathy is present.     1. Levocurvature of the lumbar spine with advanced multilevel spondylosis. 2. Grade 2 anterolisthesis of L5 on S1. This report was signed by Pavel Hooper MD on 8/9/2023 7:35 AM on the following workstation: [employer].      Medical Decision Making  Patient is a 73-year-old female presents to the emergency room complaint of right lower back pain. Patient has had onset of pain for the last several weeks. Patient on x-ray has a grade 2 anterolisthesis of L5 on S1. Patient will be discharged home with Mobic along with Ultram as needed. Patient will need to follow-up with [facility] for further management. Patient is stable for discharge.        Review of Systems   Constitutional: Negative.    HENT: Negative.     Eyes:  Negative for pain and visual disturbance.   Respiratory:  Negative for cough, chest tightness, shortness of breath and wheezing.    Cardiovascular:  Negative for chest pain, palpitations and leg swelling.   Gastrointestinal:  Negative for abdominal distention, abdominal pain, constipation, diarrhea, nausea and vomiting.   Genitourinary:   Negative for difficulty urinating, dysuria, flank pain and frequency.   Musculoskeletal:  Positive for arthralgias, back pain and gait problem.   Skin:  Positive for color change. Negative for rash and wound.   Allergic/Immunologic: Negative.    Neurological:  Positive for weakness. Negative for dizziness and headaches.   Hematological: Negative.    Psychiatric/Behavioral: Negative.         Patient Active Problem List   Diagnosis    Osteoarthritis of right hip    Type 2 diabetes mellitus    Hypertension associated with diabetes    Status post total replacement of right hip    Primary osteoarthritis of right knee    Osteoarthritis of ankle and foot    Unsteady gait    Peroneal tendonitis, unspecified laterality    Ingrown nail    Cellulitis of left lower extremity    Cellulitis of left leg    Obesity, Class III, BMI 40-49.9 (morbid obesity)    Carpal tunnel syndrome, bilateral    Cervical radiculopathy    Cervical spondylosis without myelopathy    Diabetic polyneuropathy    Long term current use of insulin    Lumbar radiculopathy    Mixed hyperlipidemia    Type II diabetes mellitus with neurological manifestations    Depressive disorder       Objective:      Physical Exam  Vitals and nursing note reviewed.   Constitutional:       General: She is not in acute distress.     Appearance: Normal appearance. She is obese.      Comments: Ambulation with Rolator    Eyes:      Conjunctiva/sclera: Conjunctivae normal.      Comments: Corrective lenses    Cardiovascular:      Rate and Rhythm: Normal rate and regular rhythm.      Heart sounds: Normal heart sounds. No murmur heard.  Pulmonary:      Effort: Pulmonary effort is normal. No respiratory distress.      Breath sounds: Normal breath sounds. No wheezing.   Musculoskeletal:      Lumbar back: Tenderness and bony tenderness present. Decreased range of motion.      Right lower leg: No edema.      Left lower le+ Edema present.   Feet:      Comments: Dressing to left foot  clean,dry intact.   Walking boot in use   Skin:     General: Skin is warm and dry.      Capillary Refill: Capillary refill takes less than 2 seconds.          Neurological:      General: No focal deficit present.      Mental Status: She is alert.   Psychiatric:         Attention and Perception: Attention and perception normal.         Mood and Affect: Mood normal.         Behavior: Behavior normal.         Cognition and Memory: Cognition normal.         Lab Results   Component Value Date    WBC 6.28 07/22/2022    HGB 14.8 07/22/2022    HCT 46.8 07/22/2022     07/22/2022    CHOL 136 07/07/2023    TRIG 125 07/07/2023    HDL 44 07/07/2023    ALT 17 07/07/2023    AST 26 07/07/2023     07/07/2023    K 4.8 07/07/2023     07/07/2023    CREATININE 0.9 07/07/2023    BUN 38 (H) 07/07/2023    CO2 26 07/07/2023    HGBA1C 5.4 07/07/2023     The 10-year ASCVD risk score (Adrianna EUGENE, et al., 2019) is: 25.4%    Values used to calculate the score:      Age: 73 years      Sex: Female      Is Non- : No      Diabetic: Yes      Tobacco smoker: No      Systolic Blood Pressure: 118 mmHg      Is BP treated: Yes      HDL Cholesterol: 44 mg/dL      Total Cholesterol: 136 mg/dL  Visit Vitals  /72 (BP Location: Left arm, Patient Position: Sitting, BP Method: Large (Manual))   Pulse 76   Wt 108 kg (238 lb 1.6 oz)   SpO2 98%   BMI 40.87 kg/m²      Assessment:       1. DDD (degenerative disc disease), lumbar    2. Lumbar spondylosis    3. Pain related to impaired physical mobility    4. Drug-induced nausea and vomiting    5. Type II diabetes mellitus with neurological manifestations        Plan:       1. DDD (degenerative disc disease), lumbar  -     Ambulatory referral/consult to [facility]; Future; Expected date: 08/21/2023  -     methocarbamoL (ROBAXIN) 500 MG Tab; Take 1 tablet (500 mg total) by mouth 4 (four) times daily as needed (Muscle spasm).  Dispense: 40 tablet; Refill: 0  -      Ambulatory referral/consult to Home Health; Future; Expected date: 08/15/2023   referral for PT/OT- patient currently having increased issues with ambulation related to back pain  Referral to [facility] Sp[facility]- patient would like to discuss injections/additional options  Continue Diclofenac as prescribed, stop meloxicam  Continue topicals: voltaren, Biofreeze, Solanpas patches  Continue ice/heat  DME at all times  Zofran to assist with Nausea r/t Tramadol use- use as needed   Methocarbamol as needed for muscle spasms- may cause drowsiness.    2. Lumbar spondylosis  -     Ambulatory referral/consult to [facility]; Future; Expected date: 08/21/2023  -     methocarbamoL (ROBAXIN) 500 MG Tab; Take 1 tablet (500 mg total) by mouth 4 (four) times daily as needed (Muscle spasm).  Dispense: 40 tablet; Refill: 0  -     Ambulatory referral/consult to Home Health; Future; Expected date: 08/15/2023    3. Pain related to impaired physical mobility  -     Ambulatory referral/consult to [facility]; Future; Expected date: 08/21/2023  -     Ambulatory referral/consult to Home Health; Future; Expected date: 08/15/2023    4. Drug-induced nausea and vomiting  -     ondansetron (ZOFRAN) 8 MG tablet; Take 1 tablet (8 mg total) by mouth every 8 (eight) hours as needed for Nausea.  Dispense: 30 tablet; Refill: 0    5. Type II diabetes mellitus with neurological manifestations       No follow-ups on file.      Future Appointments       Date Provider Specialty Appt Notes    8/25/2023 Giacomo Cohn DPM Podiatry follow up wound check    10/9/2023  Lab nonfasting    10/16/2023 Aura Pereyra NP Family Medicine 3 month follow up DM

## 2023-08-15 ENCOUNTER — TELEPHONE (OUTPATIENT)
Dept: FAMILY MEDICINE | Facility: CLINIC | Age: 73
End: 2023-08-15
Payer: MEDICARE

## 2023-08-24 ENCOUNTER — TELEPHONE (OUTPATIENT)
Dept: FAMILY MEDICINE | Facility: CLINIC | Age: 73
End: 2023-08-24
Payer: MEDICARE

## 2023-08-24 NOTE — TELEPHONE ENCOUNTER
----- Message from Chaya Kwame sent at 8/24/2023 10:14 AM CDT -----  Contact: Patient  Type:  Patient Call          Who Called: Patient         Does the patient know what this is regarding?: Requesting a call back pt said she have lost the information of the bone & joint Dr that she was referred to ; she's in lots of pain and would like to be referred to Sr that can help her ; please advise           Would the patient rather a call back or a response via [employer]? Call           Best Call Back Number: [phone] (home)              Additional Information:

## 2023-08-25 ENCOUNTER — OFFICE VISIT (OUTPATIENT)
Dept: PODIATRY | Facility: CLINIC | Age: 73
End: 2023-08-25
Payer: MEDICARE

## 2023-08-25 VITALS — WEIGHT: 238 LBS | BODY MASS INDEX: 40.63 KG/M2 | HEIGHT: 64 IN

## 2023-08-25 DIAGNOSIS — L97.521 ULCER OF LEFT FOOT, LIMITED TO BREAKDOWN OF SKIN: Primary | ICD-10-CM

## 2023-08-25 DIAGNOSIS — E11.42 DIABETIC POLYNEUROPATHY ASSOCIATED WITH TYPE 2 DIABETES MELLITUS: ICD-10-CM

## 2023-08-25 PROCEDURE — 97597 DBRDMT OPN WND 1ST 20 CM/<: CPT | Mod: ,,, | Performed by: PODIATRIST

## 2023-08-25 PROCEDURE — 3061F NEG MICROALBUMINURIA REV: CPT | Mod: CPTII,,, | Performed by: PODIATRIST

## 2023-08-25 PROCEDURE — 3061F PR NEG MICROALBUMINURIA RESULT DOCUMENTED/REVIEW: ICD-10-PCS | Mod: CPTII,,, | Performed by: PODIATRIST

## 2023-08-25 PROCEDURE — 99213 OFFICE O/P EST LOW 20 MIN: CPT | Mod: 25,,, | Performed by: PODIATRIST

## 2023-08-25 PROCEDURE — 1126F PR PAIN SEVERITY QUANTIFIED, NO PAIN PRESENT: ICD-10-PCS | Mod: CPTII,,, | Performed by: PODIATRIST

## 2023-08-25 PROCEDURE — 99213 PR OFFICE/OUTPT VISIT, EST, LEVL III, 20-29 MIN: ICD-10-PCS | Mod: 25,,, | Performed by: PODIATRIST

## 2023-08-25 PROCEDURE — 97597 WOUND DEBRIDEMENT: ICD-10-PCS | Mod: ,,, | Performed by: PODIATRIST

## 2023-08-25 PROCEDURE — 4010F PR ACE/ARB THEARPY RXD/TAKEN: ICD-10-PCS | Mod: CPTII,,, | Performed by: PODIATRIST

## 2023-08-25 PROCEDURE — 3008F PR BODY MASS INDEX (BMI) DOCUMENTED: ICD-10-PCS | Mod: CPTII,,, | Performed by: PODIATRIST

## 2023-08-25 PROCEDURE — 3044F HG A1C LEVEL LT 7.0%: CPT | Mod: CPTII,,, | Performed by: PODIATRIST

## 2023-08-25 PROCEDURE — 1126F AMNT PAIN NOTED NONE PRSNT: CPT | Mod: CPTII,,, | Performed by: PODIATRIST

## 2023-08-25 PROCEDURE — 3008F BODY MASS INDEX DOCD: CPT | Mod: CPTII,,, | Performed by: PODIATRIST

## 2023-08-25 PROCEDURE — 3066F PR DOCUMENTATION OF TREATMENT FOR NEPHROPATHY: ICD-10-PCS | Mod: CPTII,,, | Performed by: PODIATRIST

## 2023-08-25 PROCEDURE — 3044F PR MOST RECENT HEMOGLOBIN A1C LEVEL <7.0%: ICD-10-PCS | Mod: CPTII,,, | Performed by: PODIATRIST

## 2023-08-25 PROCEDURE — 4010F ACE/ARB THERAPY RXD/TAKEN: CPT | Mod: CPTII,,, | Performed by: PODIATRIST

## 2023-08-25 PROCEDURE — 3066F NEPHROPATHY DOC TX: CPT | Mod: CPTII,,, | Performed by: PODIATRIST

## 2023-09-08 ENCOUNTER — OFFICE VISIT (OUTPATIENT)
Dept: PODIATRY | Facility: CLINIC | Age: 73
End: 2023-09-08
Payer: MEDICARE

## 2023-09-08 VITALS — BODY MASS INDEX: 40.63 KG/M2 | WEIGHT: 238 LBS | HEIGHT: 64 IN

## 2023-09-08 DIAGNOSIS — L97.521 ULCER OF LEFT FOOT, LIMITED TO BREAKDOWN OF SKIN: Primary | ICD-10-CM

## 2023-09-08 DIAGNOSIS — E11.42 DIABETIC POLYNEUROPATHY ASSOCIATED WITH TYPE 2 DIABETES MELLITUS: ICD-10-CM

## 2023-09-08 PROCEDURE — 97597 DBRDMT OPN WND 1ST 20 CM/<: CPT | Mod: ,,, | Performed by: PODIATRIST

## 2023-09-08 PROCEDURE — 99499 UNLISTED E&M SERVICE: CPT | Mod: ,,, | Performed by: PODIATRIST

## 2023-09-08 PROCEDURE — 97597 WOUND DEBRIDEMENT: ICD-10-PCS | Mod: ,,, | Performed by: PODIATRIST

## 2023-09-08 PROCEDURE — 99499 NO LOS: ICD-10-PCS | Mod: ,,, | Performed by: PODIATRIST

## 2023-09-08 NOTE — PROGRESS NOTES
Subjective:     Patient ID: Marifer Bernardo is a 73 y.o. female.    Chief Complaint: Foot Ulcer    Marifer is a 73 y.o. female who presents to the clinic for evaluation and treatment of high risk feet. Marifer has a past medical history of Arthritis, Diabetes mellitus, type 2, Encounter for blood transfusion, Hypertension, PONV (postoperative nausea and vomiting), and Wears glasses. The patient's chief complaint is diabetic foot ulcer, left 1st MPJ.     PCP: Aura Pereyra NP        Current shoe gear:  Affected Foot: Slip-on shoes     Unaffected Foot: Slip-on shoes    History of Trauma: negative  Sign of Infection: none    Hemoglobin A1C   Date Value Ref Range Status   07/07/2023 5.4 4.0 - 5.6 % Final     Comment:     ADA Screening Guidelines:  5.7-6.4%  Consistent with prediabetes  >or=6.5%  Consistent with diabetes    High levels of fetal hemoglobin interfere with the HbA1C  assay. Heterozygous hemoglobin variants (HbS, HgC, etc)do  not significantly interfere with this assay.   However, presence of multiple variants may affect accuracy.     08/30/2022 5.7 (A) 5.6 % Final   07/22/2022 5.8 (H) 4.0 - 5.6 % Final     Comment:     ADA Screening Guidelines:  5.7-6.4%  Consistent with prediabetes  >or=6.5%  Consistent with diabetes    High levels of fetal hemoglobin interfere with the HbA1C  assay. Heterozygous hemoglobin variants (HbS, HgC, etc)do  not significantly interfere with this assay.   However, presence of multiple variants may affect accuracy.     09/17/2021 5.8 (H) 4.0 - 5.6 % Final     Comment:     ADA Screening Guidelines:  5.7-6.4%  Consistent with prediabetes  >or=6.5%  Consistent with diabetes    High levels of fetal hemoglobin interfere with the HbA1C  assay. Heterozygous hemoglobin variants (HbS, HgC, etc)do  not significantly interfere with this assay.   However, presence of multiple variants may affect accuracy.         Review of Systems   Constitutional: Negative for chills and fever.    Cardiovascular:  Positive for leg swelling. Negative for chest pain.   Respiratory:  Negative for cough and shortness of breath.    Gastrointestinal:  Negative for diarrhea, nausea and vomiting.   Neurological:  Positive for numbness and paresthesias.        Objective:     Physical Exam  Vitals reviewed.   Constitutional:       General: She is not in acute distress.     Appearance: Normal appearance. She is not ill-appearing.   HENT:      Head: Normocephalic.      Nose: Nose normal.   Cardiovascular:      Rate and Rhythm: Normal rate.   Pulmonary:      Effort: Pulmonary effort is normal. No respiratory distress.   Skin:     Capillary Refill: Capillary refill takes 2 to 3 seconds.   Neurological:      Mental Status: She is alert and oriented to person, place, and time.   Psychiatric:         Mood and Affect: Mood normal.         Behavior: Behavior normal.         Thought Content: Thought content normal.         Judgment: Judgment normal.       Dermatologic:  Diabetic ulceration noted to plantar aspect left 1st digit MPJ with no active drainage no signs of infection wound measures roughly 0.4 x 0.5 x 0.1 cm, elongated nails 1 through 5 bilateral foot, no rashes noted bilateral foot, no interdigital maceration noted bilateral foot, right 1st digit nail plate is excessively elongated but shows no acute signs of infection or ingrown status.  Nails 1 through 5 bilateral are mildly thickened, discolored and elongated  Neurologic:  Protective and light touch sensation intact bilateral lower extremity, positive paresthesias reported  Musculoskeletal:  5/5 muscle strength noted bilateral foot, ankle joint range of motion is decreased without pain, no pain and tenderness with palpation of right 1st digit or plantar left 1st MPJ wound  Vascular:  DP and PT pulse palpable 1/4 bilateral foot, capillary fill time less than 3 seconds to distal aspect of the digits bilateral foot, pedal hair growth is absent to the digits  bilateral foot, atrophic soft tissue skin changes noted to the bilateral lower extremity, hyperpigmentation noted to the bilateral lower 1/3 of the leg left leg greater than right, edema noted to the bilateral foot and ankle    Assessment:      Encounter Diagnoses   Name Primary?    Ulcer of left foot, limited to breakdown of skin Yes    Diabetic polyneuropathy associated with type 2 diabetes mellitus      Plan:     Marifer was seen today for foot ulcer.    Diagnoses and all orders for this visit:    Ulcer of left foot, limited to breakdown of skin  -     Wound Debridement    Diabetic polyneuropathy associated with type 2 diabetes mellitus      I counseled the patient on her conditions, their implications and medical management.        1. Patient was examined and evaluated.    2. Discussed patient continued presence of diabetic ulcer plantar left 1st MPJ.  Patient made aware that no signs of infection are present today and area seems to be improving as expected.  Patient will continue with daily dressing changes to the area with application of offloading pad and Betadine and a dry sterile bandage.    Wound Debridement    Date/Time: 9/8/2023 9:30 AM    Performed by: Giacomo Cohn DPM  Authorized by: Giacomo Cohn DPM    Consent Done?:  Yes (Verbal)    Wound Details:    Location:  Left foot    Location:  Left 1st Metatarsal Head       Length (cm):  0.4       Area (sq cm):  0.2       Width (cm):  0.5       Percent Debrided (%):  100       Depth (cm):  0.1       Total Area Debrided (sq cm):  0.2    Depth of debridement:  Epidermis/Dermis    Instruments:  Blade  Bleeding:  Minimal  Hemostasis Achieved: Yes  Method Used:  Pressure  Patient tolerance:  Patient tolerated the procedure well with no immediate complications  1st Wound Pain Assessment: 0      3. Patient will continue with daily foot monitoring.  Patient will continue efforts proper glycemic control, lowering hemoglobin A1c, and adherence to diabetic  medication regimen.  Patient was advised to continue with elevation lower extremity when at rest and she will continue monitor bilateral lower leg for possible cellulitic changes.  4. Patient will follow-up in 2 weeks or p.r.n. for complaints

## 2023-09-08 NOTE — PROGRESS NOTES
Subjective:     Patient ID: Marifer Bernardo is a 73 y.o. female.    Chief Complaint: Diabetes Mellitus and Follow-up    Marifer is a 73 y.o. female who presents to the clinic for evaluation and treatment of high risk feet. Marifer has a past medical history of Arthritis, Diabetes mellitus, type 2, Encounter for blood transfusion, Hypertension, PONV (postoperative nausea and vomiting), and Wears glasses. The patient's chief complaint is diabetic foot ulcer, plantar left 1st MPJ.     PCP: Aura Pereyra NP        Current shoe gear:  Affected Foot: Slip-on shoes     Unaffected Foot: Slip-on shoes    History of Trauma: negative  Sign of Infection: none    Hemoglobin A1C   Date Value Ref Range Status   07/07/2023 5.4 4.0 - 5.6 % Final     Comment:     ADA Screening Guidelines:  5.7-6.4%  Consistent with prediabetes  >or=6.5%  Consistent with diabetes    High levels of fetal hemoglobin interfere with the HbA1C  assay. Heterozygous hemoglobin variants (HbS, HgC, etc)do  not significantly interfere with this assay.   However, presence of multiple variants may affect accuracy.     08/30/2022 5.7 (A) 5.6 % Final   07/22/2022 5.8 (H) 4.0 - 5.6 % Final     Comment:     ADA Screening Guidelines:  5.7-6.4%  Consistent with prediabetes  >or=6.5%  Consistent with diabetes    High levels of fetal hemoglobin interfere with the HbA1C  assay. Heterozygous hemoglobin variants (HbS, HgC, etc)do  not significantly interfere with this assay.   However, presence of multiple variants may affect accuracy.     09/17/2021 5.8 (H) 4.0 - 5.6 % Final     Comment:     ADA Screening Guidelines:  5.7-6.4%  Consistent with prediabetes  >or=6.5%  Consistent with diabetes    High levels of fetal hemoglobin interfere with the HbA1C  assay. Heterozygous hemoglobin variants (HbS, HgC, etc)do  not significantly interfere with this assay.   However, presence of multiple variants may affect accuracy.         Review of Systems   Constitutional: Negative for  chills and fever.   Cardiovascular:  Positive for leg swelling. Negative for chest pain.   Respiratory:  Negative for cough and shortness of breath.    Gastrointestinal:  Negative for diarrhea, nausea and vomiting.   Neurological:  Positive for paresthesias.        Objective:     Physical Exam  Vitals reviewed.   Constitutional:       General: She is not in acute distress.     Appearance: Normal appearance. She is not ill-appearing.   HENT:      Head: Normocephalic.      Nose: Nose normal.   Cardiovascular:      Rate and Rhythm: Normal rate.   Pulmonary:      Effort: Pulmonary effort is normal. No respiratory distress.   Skin:     Capillary Refill: Capillary refill takes 2 to 3 seconds.   Neurological:      Mental Status: She is alert and oriented to person, place, and time.   Psychiatric:         Mood and Affect: Mood normal.         Behavior: Behavior normal.         Thought Content: Thought content normal.         Judgment: Judgment normal.       Dermatologic:  Diabetic ulceration noted to plantar aspect left 1st digit MPJ with no active drainage no signs of infection wound measures roughly 0.5 x 0.8 x 0.3 cm, elongated nails 1 through 5 bilateral foot, no rashes noted bilateral foot, no interdigital maceration noted bilateral foot, right 1st digit nail plate is excessively elongated but shows no acute signs of infection or ingrown status.  Nails 1 through 5 bilateral are mildly thickened, discolored and elongated  Neurologic:  Protective and light touch sensation intact bilateral lower extremity, positive paresthesias reported  Musculoskeletal:  5/5 muscle strength noted bilateral foot, ankle joint range of motion is decreased without pain, no pain and tenderness with palpation of right 1st digit or plantar left 1st MPJ wound  Vascular:  DP and PT pulse palpable 1/4 bilateral foot, capillary fill time less than 3 seconds to distal aspect of the digits bilateral foot, pedal hair growth is absent to the digits  bilateral foot, atrophic soft tissue skin changes noted to the bilateral lower extremity, hyperpigmentation noted to the bilateral lower 1/3 of the leg left leg greater than right, edema noted to the bilateral foot and ankle    Assessment:      Encounter Diagnoses   Name Primary?    Ulcer of left foot, limited to breakdown of skin Yes    Diabetic polyneuropathy associated with type 2 diabetes mellitus      Plan:     Marifer was seen today for diabetes mellitus and follow-up.    Diagnoses and all orders for this visit:    Ulcer of left foot, limited to breakdown of skin  -     Wound Debridement    Diabetic polyneuropathy associated with type 2 diabetes mellitus      I counseled the patient on her conditions, their implications and medical management.        1. Patient was examined and evaluated.    2. Patient made aware of presence of ulceration plantar aspect left 1st MPJ.  Patient was advised to perform daily dressing changes to the area with application of Betadine or triple antibiotic ointment and a dry sterile bandage + an offloading pad.  Offloading pads were dispensed to the patient.  Wound Debridement    Date/Time: 8/25/2023 12:45 PM    Performed by: Giacomo Cohn DPM  Authorized by: Giacomo Cohn DPM    Consent Done?:  Yes (Verbal)    Wound Details:    Location:  Left foot    Location:  Left 1st Metatarsal Head       Length (cm):  0.5       Area (sq cm):  0.4       Width (cm):  0.8       Percent Debrided (%):  100       Depth (cm):  0.3       Total Area Debrided (sq cm):  0.4    Depth of debridement:  Epidermis/Dermis    Devitalized tissue debrided:  Biofilm and Callus    Instruments:  Blade  Bleeding:  Minimal  Hemostasis Achieved: Yes  Method Used:  Pressure  Patient tolerance:  Patient tolerated the procedure well with no immediate complications  1st Wound Pain Assessment: 0      3. Patient was advised to monitor for constitutional symptoms.  Patient will report any constitutional symptoms to  her primary care physician, local urgent care center, or [facility]    4. Patient was advised to continue with daily foot monitoring.  Patient will continue efforts proper glycemic control, lowering hemoglobin A1c, adherence to diabetic medication regimen.    5. Patient will follow-up in 2 weeks or p.r.n. for complaints

## 2023-09-27 DIAGNOSIS — E11.9 TYPE 2 DIABETES MELLITUS WITHOUT COMPLICATION, UNSPECIFIED WHETHER LONG TERM INSULIN USE: ICD-10-CM

## 2023-10-04 ENCOUNTER — TELEPHONE (OUTPATIENT)
Dept: FAMILY MEDICINE | Facility: CLINIC | Age: 73
End: 2023-10-04
Payer: MEDICARE

## 2023-10-04 NOTE — TELEPHONE ENCOUNTER
"Informed pt Aura Pereyra, NP is now at [place]. Pt states " I have been seeing Aura for years. I cannot believe this happened." Blood work r/s. Pt voiced understanding.   "

## 2023-10-04 NOTE — TELEPHONE ENCOUNTER
----- Message from Artemio Vernon sent at 10/4/2023 11:11 AM CDT -----  Type: Needs Medical Advice  Who Called:  victor manuel  Best Call Back Number: [phone]  Additional Information: pt is requesting a call back from the nurse about not being informed about the move of location with her doctors, pl call bk to advise thanks

## 2023-10-05 DIAGNOSIS — E78.2 MIXED HYPERLIPIDEMIA: ICD-10-CM

## 2023-10-05 DIAGNOSIS — Z79.4 TYPE 2 DIABETES MELLITUS WITH COMPLICATION, WITH LONG-TERM CURRENT USE OF INSULIN: ICD-10-CM

## 2023-10-05 DIAGNOSIS — E11.8 TYPE 2 DIABETES MELLITUS WITH COMPLICATION, WITH LONG-TERM CURRENT USE OF INSULIN: ICD-10-CM

## 2023-10-05 RX ORDER — FENOFIBRATE 145 MG/1
145 TABLET, FILM COATED ORAL DAILY
Qty: 90 TABLET | Refills: 3 | Status: SHIPPED | OUTPATIENT
Start: 2023-10-05

## 2023-10-05 RX ORDER — INSULIN ASPART 100 [IU]/ML
INJECTION, SUSPENSION SUBCUTANEOUS
Qty: 120 ML | Refills: 5 | Status: SHIPPED | OUTPATIENT
Start: 2023-10-05

## 2023-10-05 NOTE — TELEPHONE ENCOUNTER
Received faxed refill request from [facility] pharmacy requesting refills on patient's novolog & fenofibrate prescription.     LOV: 8/14/23 w/ NP Necaise   NOV: 10/16/23 w/ NP Necaise

## 2023-10-09 ENCOUNTER — LAB VISIT (OUTPATIENT)
Dept: LAB | Facility: HOSPITAL | Age: 73
End: 2023-10-09
Attending: NURSE PRACTITIONER
Payer: MEDICARE

## 2023-10-09 DIAGNOSIS — R53.83 FATIGUE, UNSPECIFIED TYPE: ICD-10-CM

## 2023-10-09 DIAGNOSIS — E11.42 TYPE 2 DIABETES MELLITUS WITH DIABETIC POLYNEUROPATHY, WITH LONG-TERM CURRENT USE OF INSULIN: Primary | ICD-10-CM

## 2023-10-09 DIAGNOSIS — E11.42 TYPE 2 DIABETES MELLITUS WITH DIABETIC POLYNEUROPATHY, WITH LONG-TERM CURRENT USE OF INSULIN: ICD-10-CM

## 2023-10-09 DIAGNOSIS — Z79.4 TYPE 2 DIABETES MELLITUS WITH DIABETIC POLYNEUROPATHY, WITH LONG-TERM CURRENT USE OF INSULIN: Primary | ICD-10-CM

## 2023-10-09 DIAGNOSIS — Z79.4 TYPE 2 DIABETES MELLITUS WITH DIABETIC POLYNEUROPATHY, WITH LONG-TERM CURRENT USE OF INSULIN: ICD-10-CM

## 2023-10-09 LAB
BASOPHILS # BLD AUTO: 0.04 K/UL (ref 0–0.2)
BASOPHILS NFR BLD: 0.4 % (ref 0–1.9)
DIFFERENTIAL METHOD: ABNORMAL
EOSINOPHIL # BLD AUTO: 0.1 K/UL (ref 0–0.5)
EOSINOPHIL NFR BLD: 0.9 % (ref 0–8)
ERYTHROCYTE [DISTWIDTH] IN BLOOD BY AUTOMATED COUNT: 16.2 % (ref 11.5–14.5)
ESTIMATED AVG GLUCOSE: 114 MG/DL (ref 68–131)
HBA1C MFR BLD: 5.6 % (ref 4–5.6)
HCT VFR BLD AUTO: 39.2 % (ref 37–48.5)
HGB BLD-MCNC: 12.3 G/DL (ref 12–16)
IMM GRANULOCYTES # BLD AUTO: 0.06 K/UL (ref 0–0.04)
IMM GRANULOCYTES NFR BLD AUTO: 0.6 % (ref 0–0.5)
LYMPHOCYTES # BLD AUTO: 1.9 K/UL (ref 1–4.8)
LYMPHOCYTES NFR BLD: 20 % (ref 18–48)
MCH RBC QN AUTO: 27.6 PG (ref 27–31)
MCHC RBC AUTO-ENTMCNC: 31.4 G/DL (ref 32–36)
MCV RBC AUTO: 88 FL (ref 82–98)
MONOCYTES # BLD AUTO: 0.6 K/UL (ref 0.3–1)
MONOCYTES NFR BLD: 6.5 % (ref 4–15)
NEUTROPHILS # BLD AUTO: 6.9 K/UL (ref 1.8–7.7)
NEUTROPHILS NFR BLD: 71.6 % (ref 38–73)
NRBC BLD-RTO: 0 /100 WBC
PLATELET # BLD AUTO: 318 K/UL (ref 150–450)
PMV BLD AUTO: 9 FL (ref 9.2–12.9)
RBC # BLD AUTO: 4.46 M/UL (ref 4–5.4)
WBC # BLD AUTO: 9.58 K/UL (ref 3.9–12.7)

## 2023-10-09 PROCEDURE — 83036 HEMOGLOBIN GLYCOSYLATED A1C: CPT | Performed by: NURSE PRACTITIONER

## 2023-10-09 PROCEDURE — 36415 COLL VENOUS BLD VENIPUNCTURE: CPT | Performed by: NURSE PRACTITIONER

## 2023-10-09 PROCEDURE — 85025 COMPLETE CBC W/AUTO DIFF WBC: CPT | Performed by: NURSE PRACTITIONER

## 2023-10-16 ENCOUNTER — OFFICE VISIT (OUTPATIENT)
Dept: FAMILY MEDICINE | Facility: CLINIC | Age: 73
End: 2023-10-16
Payer: MEDICARE

## 2023-10-16 VITALS
HEART RATE: 67 BPM | SYSTOLIC BLOOD PRESSURE: 128 MMHG | DIASTOLIC BLOOD PRESSURE: 84 MMHG | OXYGEN SATURATION: 97 % | BODY MASS INDEX: 40.35 KG/M2 | WEIGHT: 235.13 LBS

## 2023-10-16 DIAGNOSIS — Z74.09 PAIN RELATED TO IMPAIRED PHYSICAL MOBILITY: ICD-10-CM

## 2023-10-16 DIAGNOSIS — E11.59 HYPERTENSION ASSOCIATED WITH DIABETES: ICD-10-CM

## 2023-10-16 DIAGNOSIS — M51.36 DDD (DEGENERATIVE DISC DISEASE), LUMBAR: Primary | ICD-10-CM

## 2023-10-16 DIAGNOSIS — M17.11 PRIMARY OSTEOARTHRITIS OF RIGHT KNEE: ICD-10-CM

## 2023-10-16 DIAGNOSIS — I15.2 HYPERTENSION ASSOCIATED WITH DIABETES: ICD-10-CM

## 2023-10-16 DIAGNOSIS — R52 PAIN RELATED TO IMPAIRED PHYSICAL MOBILITY: ICD-10-CM

## 2023-10-16 DIAGNOSIS — E11.42 TYPE 2 DIABETES MELLITUS WITH DIABETIC POLYNEUROPATHY, WITH LONG-TERM CURRENT USE OF INSULIN: ICD-10-CM

## 2023-10-16 DIAGNOSIS — Z79.4 TYPE 2 DIABETES MELLITUS WITH DIABETIC POLYNEUROPATHY, WITH LONG-TERM CURRENT USE OF INSULIN: ICD-10-CM

## 2023-10-16 PROCEDURE — 3061F NEG MICROALBUMINURIA REV: CPT | Mod: CPTII,S$GLB,, | Performed by: NURSE PRACTITIONER

## 2023-10-16 PROCEDURE — 3044F PR MOST RECENT HEMOGLOBIN A1C LEVEL <7.0%: ICD-10-PCS | Mod: CPTII,S$GLB,, | Performed by: NURSE PRACTITIONER

## 2023-10-16 PROCEDURE — 99999 PR PBB SHADOW E&M-EST. PATIENT-LVL III: ICD-10-PCS | Mod: PBBFAC,,, | Performed by: NURSE PRACTITIONER

## 2023-10-16 PROCEDURE — 3288F PR FALLS RISK ASSESSMENT DOCUMENTED: ICD-10-PCS | Mod: CPTII,S$GLB,, | Performed by: NURSE PRACTITIONER

## 2023-10-16 PROCEDURE — 3061F PR NEG MICROALBUMINURIA RESULT DOCUMENTED/REVIEW: ICD-10-PCS | Mod: CPTII,S$GLB,, | Performed by: NURSE PRACTITIONER

## 2023-10-16 PROCEDURE — 99214 PR OFFICE/OUTPT VISIT, EST, LEVL IV, 30-39 MIN: ICD-10-PCS | Mod: S$GLB,,, | Performed by: NURSE PRACTITIONER

## 2023-10-16 PROCEDURE — 1160F RVW MEDS BY RX/DR IN RCRD: CPT | Mod: CPTII,S$GLB,, | Performed by: NURSE PRACTITIONER

## 2023-10-16 PROCEDURE — 1160F PR REVIEW ALL MEDS BY PRESCRIBER/CLIN PHARMACIST DOCUMENTED: ICD-10-PCS | Mod: CPTII,S$GLB,, | Performed by: NURSE PRACTITIONER

## 2023-10-16 PROCEDURE — 99999 PR PBB SHADOW E&M-EST. PATIENT-LVL III: CPT | Mod: PBBFAC,,, | Performed by: NURSE PRACTITIONER

## 2023-10-16 PROCEDURE — 3008F BODY MASS INDEX DOCD: CPT | Mod: CPTII,S$GLB,, | Performed by: NURSE PRACTITIONER

## 2023-10-16 PROCEDURE — 1126F PR PAIN SEVERITY QUANTIFIED, NO PAIN PRESENT: ICD-10-PCS | Mod: CPTII,S$GLB,, | Performed by: NURSE PRACTITIONER

## 2023-10-16 PROCEDURE — 1159F PR MEDICATION LIST DOCUMENTED IN MEDICAL RECORD: ICD-10-PCS | Mod: CPTII,S$GLB,, | Performed by: NURSE PRACTITIONER

## 2023-10-16 PROCEDURE — 3066F NEPHROPATHY DOC TX: CPT | Mod: CPTII,S$GLB,, | Performed by: NURSE PRACTITIONER

## 2023-10-16 PROCEDURE — 3044F HG A1C LEVEL LT 7.0%: CPT | Mod: CPTII,S$GLB,, | Performed by: NURSE PRACTITIONER

## 2023-10-16 PROCEDURE — 3008F PR BODY MASS INDEX (BMI) DOCUMENTED: ICD-10-PCS | Mod: CPTII,S$GLB,, | Performed by: NURSE PRACTITIONER

## 2023-10-16 PROCEDURE — 99214 OFFICE O/P EST MOD 30 MIN: CPT | Mod: S$GLB,,, | Performed by: NURSE PRACTITIONER

## 2023-10-16 PROCEDURE — 1126F AMNT PAIN NOTED NONE PRSNT: CPT | Mod: CPTII,S$GLB,, | Performed by: NURSE PRACTITIONER

## 2023-10-16 PROCEDURE — 1159F MED LIST DOCD IN RCRD: CPT | Mod: CPTII,S$GLB,, | Performed by: NURSE PRACTITIONER

## 2023-10-16 PROCEDURE — 4010F PR ACE/ARB THEARPY RXD/TAKEN: ICD-10-PCS | Mod: CPTII,S$GLB,, | Performed by: NURSE PRACTITIONER

## 2023-10-16 PROCEDURE — 3079F PR MOST RECENT DIASTOLIC BLOOD PRESSURE 80-89 MM HG: ICD-10-PCS | Mod: CPTII,S$GLB,, | Performed by: NURSE PRACTITIONER

## 2023-10-16 PROCEDURE — 1101F PT FALLS ASSESS-DOCD LE1/YR: CPT | Mod: CPTII,S$GLB,, | Performed by: NURSE PRACTITIONER

## 2023-10-16 PROCEDURE — 3288F FALL RISK ASSESSMENT DOCD: CPT | Mod: CPTII,S$GLB,, | Performed by: NURSE PRACTITIONER

## 2023-10-16 PROCEDURE — 1101F PR PT FALLS ASSESS DOC 0-1 FALLS W/OUT INJ PAST YR: ICD-10-PCS | Mod: CPTII,S$GLB,, | Performed by: NURSE PRACTITIONER

## 2023-10-16 PROCEDURE — 4010F ACE/ARB THERAPY RXD/TAKEN: CPT | Mod: CPTII,S$GLB,, | Performed by: NURSE PRACTITIONER

## 2023-10-16 PROCEDURE — 3074F PR MOST RECENT SYSTOLIC BLOOD PRESSURE < 130 MM HG: ICD-10-PCS | Mod: CPTII,S$GLB,, | Performed by: NURSE PRACTITIONER

## 2023-10-16 PROCEDURE — 3066F PR DOCUMENTATION OF TREATMENT FOR NEPHROPATHY: ICD-10-PCS | Mod: CPTII,S$GLB,, | Performed by: NURSE PRACTITIONER

## 2023-10-16 PROCEDURE — 3079F DIAST BP 80-89 MM HG: CPT | Mod: CPTII,S$GLB,, | Performed by: NURSE PRACTITIONER

## 2023-10-16 PROCEDURE — 3074F SYST BP LT 130 MM HG: CPT | Mod: CPTII,S$GLB,, | Performed by: NURSE PRACTITIONER

## 2023-10-16 RX ORDER — GABAPENTIN 400 MG/1
400 CAPSULE ORAL 3 TIMES DAILY
Qty: 270 CAPSULE | Refills: 3 | Status: SHIPPED | OUTPATIENT
Start: 2023-10-16

## 2023-10-16 RX ORDER — DICLOFENAC SODIUM 75 MG/1
75 TABLET, DELAYED RELEASE ORAL 2 TIMES DAILY
Qty: 180 TABLET | Refills: 1 | Status: SHIPPED | OUTPATIENT
Start: 2023-10-16 | End: 2024-03-19

## 2023-10-16 RX ORDER — GABAPENTIN 400 MG/1
400 CAPSULE ORAL 3 TIMES DAILY
Qty: 90 CAPSULE | Refills: 0 | Status: CANCELLED | OUTPATIENT
Start: 2023-10-16

## 2023-10-16 RX ORDER — GABAPENTIN 400 MG/1
400 CAPSULE ORAL 3 TIMES DAILY
Qty: 30 CAPSULE | Refills: 0 | Status: SHIPPED | OUTPATIENT
Start: 2023-10-16 | End: 2023-10-26

## 2023-10-16 RX ORDER — BENAZEPRIL HYDROCHLORIDE AND HYDROCHLOROTHIAZIDE 20; 12.5 MG/1; MG/1
1 TABLET ORAL DAILY
Qty: 90 TABLET | Refills: 3 | Status: SHIPPED | OUTPATIENT
Start: 2023-10-16 | End: 2024-10-15

## 2023-10-16 NOTE — PROGRESS NOTES
Subjective:       Patient ID: Marifer Bernardo is a 73 y.o. female.    Chief Complaint: Follow-up (3 month follow up ), Diabetes (Patient reports she does monitor glucose levels 3-4 times daily at home. ), Hypertension (Patient reports pharmacy has informed her quinapril is no longer available in any formulation and alternative will need to be prescribed. ), and Medication Refill       Vimal Bernardo presents to the clinic today for routine follow up visit  PM of Diabetes mellitus, hypertension, hyperlipidemia, morbid obesity, osteoarthritis. 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   She was previously under the care of Podiatry- Dr. Arreola in the past for recurrent diabetic foot ulcer, has since established with Dr. Leon  She recently established with [facility], Dr. Sosa  and underwent MRI at [place],- reports she was told to increase her Gabapentin to 400 mg 3 times daily versus once daily, but a new prescription was not provided and she has since ran out of the medication.  She [facility] and just went from seeing him 2 times weekly to once weekly. Next appointment is Monday Oct. 23rd.-   Reports that the recent increase in the Gabapentin has helped her symptoms. Denies any excessive drowsiness.      Refuses Mammogram, Colonoscopy- reports she will complete a DEXA      Diabetes:  Reports monitors 4 times per day with values ranging 70's-120's.  Victoza was previous discontinued due to cost of medication. Her A1c is controlled. Denies any hypoglycemic episodes. Continues to take her Xigduo  She is due for a diabetic eye exam with [facility] -  Dr. Kee. Last screen prior to COVID pandemic      Hypertension:  Patient reports she does monitor BP values every day around 5:30pm. Reports values are typically 120's/80's.  Reports she is needing her Quinapril prescription  changed- medication is no longer available.       Review of Systems   Constitutional: Negative.    HENT: Negative.     Eyes:  Negative for pain and visual disturbance.   Respiratory:  Negative for chest tightness, shortness of breath and wheezing.    Cardiovascular:  Negative for chest pain, palpitations and leg swelling.   Gastrointestinal:  Negative for abdominal pain, constipation and diarrhea.   Endocrine: Negative.    Genitourinary:  Negative for difficulty urinating and vaginal pain.   Musculoskeletal:  Positive for arthralgias, back pain and gait problem.   Skin:  Negative for wound.   Allergic/Immunologic: Negative.    Neurological:  Positive for numbness. Negative for dizziness and weakness.   Hematological: Negative.    Psychiatric/Behavioral: Negative.         Patient Active Problem List   Diagnosis    Osteoarthritis of right hip    Type 2 diabetes mellitus    Hypertension associated with diabetes    Status post total replacement of right hip    Primary osteoarthritis of right knee    Osteoarthritis of ankle and foot    Unsteady gait    Peroneal tendonitis, unspecified laterality    Ingrown nail    Cellulitis of left lower extremity    Cellulitis of left leg    Obesity, Class III, BMI 40-49.9 (morbid obesity)    Carpal tunnel syndrome, bilateral    Cervical radiculopathy    Cervical spondylosis without myelopathy    Diabetic polyneuropathy    Long term current use of insulin    Lumbar radiculopathy    Mixed hyperlipidemia    Type II diabetes mellitus with neurological manifestations    Depressive disorder       Objective:      Physical Exam  Vitals and nursing note reviewed.   Constitutional:       General: She is not in acute distress.     Appearance: Normal appearance. She is obese.      Comments: Ambulation with Rolator    Eyes:      Conjunctiva/sclera: Conjunctivae normal.      Comments: Corrective lenses    Cardiovascular:      Rate and Rhythm: Normal rate and regular rhythm.      Heart sounds:  Normal heart sounds. No murmur heard.  Pulmonary:      Effort: Pulmonary effort is normal. No respiratory distress.      Breath sounds: Normal breath sounds. No wheezing.   Musculoskeletal:         General: Normal range of motion.      Right lower leg: No edema.      Left lower leg: No edema.   Skin:     General: Skin is warm and dry.      Capillary Refill: Capillary refill takes less than 2 seconds.   Neurological:      General: No focal deficit present.      Mental Status: She is alert.   Psychiatric:         Attention and Perception: Attention and perception normal.         Mood and Affect: Mood normal.         Behavior: Behavior normal.         Cognition and Memory: Cognition normal.         Lab Results   Component Value Date    WBC 9.58 10/09/2023    HGB 12.3 10/09/2023    HCT 39.2 10/09/2023     10/09/2023    CHOL 136 07/07/2023    TRIG 125 07/07/2023    HDL 44 07/07/2023    ALT 17 07/07/2023    AST 26 07/07/2023     07/07/2023    K 4.8 07/07/2023     07/07/2023    CREATININE 0.9 07/07/2023    BUN 38 (H) 07/07/2023    CO2 26 07/07/2023    HGBA1C 5.6 10/09/2023     The 10-year ASCVD risk score (Adrianna EUGENE, et al., 2019) is: 29.2%    Values used to calculate the score:      Age: 73 years      Sex: Female      Is Non- : No      Diabetic: Yes      Tobacco smoker: No      Systolic Blood Pressure: 128 mmHg      Is BP treated: Yes      HDL Cholesterol: 44 mg/dL      Total Cholesterol: 136 mg/dL  Visit Vitals  /84 (BP Location: Right arm, Patient Position: Sitting, BP Method: Medium (Manual))   Pulse 67   Wt 106.6 kg (235 lb 1.6 oz)   SpO2 97%   BMI 40.35 kg/m²      Assessment:       1. DDD (degenerative disc disease), lumbar    2. Type 2 diabetes mellitus with diabetic polyneuropathy, with long-term current use of insulin    3. Primary osteoarthritis of right knee    4. Pain related to impaired physical mobility    5. Hypertension associated with diabetes        Plan:        1. DDD (degenerative disc disease), lumbar  -     gabapentin (NEURONTIN) 400 MG capsule; Take 1 capsule (400 mg total) by mouth 3 (three) times daily.  Dispense: 270 capsule; Refill: 3  -     gabapentin (NEURONTIN) 400 MG capsule; Take 1 capsule (400 mg total) by mouth 3 (three) times daily. for 10 days  Dispense: 30 capsule; Refill: 0  Short term refill sent locally as well as mail order pharmacy  Continue conservative/supportive treatment   DME at all times  Weight management    2. Type 2 diabetes mellitus with diabetic polyneuropathy, with long-term current use of insulin  -     gabapentin (NEURONTIN) 400 MG capsule; Take 1 capsule (400 mg total) by mouth 3 (three) times daily.  Dispense: 270 capsule; Refill: 3  -     gabapentin (NEURONTIN) 400 MG capsule; Take 1 capsule (400 mg total) by mouth 3 (three) times daily. for 10 days  Dispense: 30 capsule; Refill: 0  Take medications as prescribed  weight management- discussed elevated BMI- weight reduction by 10%.   low fat/ low cholesterol diet discussed   portion sizes  Schedule diabetic eye examination   Follow a Low Starch Diet (simple carbohydrate)&nbsp     Decrease   1. Sugars   2. White Flour   3. White Potatoes   4. White Rice     Exercise 20 mins 3 x week work up to30 mins 4x week    monitor feet daily- if unable to see ask a friend/family member to examine feet or use a mirror. Let PCP know of any skin changes ASAP  GoldBond/moisturizing lotion to skin daily  maintain hydration  A1c prior to next visit- can obtain at [facility]   3. Primary osteoarthritis of right knee  -     diclofenac (VOLTAREN) 75 MG EC tablet; Take 1 tablet (75 mg total) by mouth 2 (two) times daily.  Dispense: 180 tablet; Refill: 1  Take as prescribed with food   4. Pain related to impaired physical mobility    5. Hypertension associated with diabetes  -     benazepril-hydrochlorthiazide (LOTENSIN HCT) 20-12.5 mg per tablet; Take 1 tablet by mouth once daily.  Dispense: 90  tablet; Refill: 3  The patient was counseled on HTN education, management and recommendations. The need for weight reduction was reinforced and a BMI goal of 19 to 25 was set.  Patient was encouraged to adhere to a low sodium diet and a DASH diet was recommended. Patient was also encouraged to engage in routine exercise such as walking most days of the week greater than 30 minutes. Patient education materials were provided to the patient for home review and further reinforcement of topics discussed.        Follow up in about 3 months (around 1/16/2024).      Future Appointments       Date Provider Specialty Appt Notes    1/22/2024 Aura Pereyra NP Family Medicine 3 month follow up DM

## 2023-11-07 ENCOUNTER — OFFICE VISIT (OUTPATIENT)
Dept: URGENT CARE | Facility: CLINIC | Age: 73
End: 2023-11-07
Payer: MEDICARE

## 2023-11-07 ENCOUNTER — TELEPHONE (OUTPATIENT)
Dept: FAMILY MEDICINE | Facility: CLINIC | Age: 73
End: 2023-11-07
Payer: MEDICARE

## 2023-11-07 VITALS
HEIGHT: 64 IN | BODY MASS INDEX: 29.37 KG/M2 | DIASTOLIC BLOOD PRESSURE: 80 MMHG | SYSTOLIC BLOOD PRESSURE: 137 MMHG | WEIGHT: 172 LBS | OXYGEN SATURATION: 95 % | RESPIRATION RATE: 18 BRPM | TEMPERATURE: 98 F | HEART RATE: 66 BPM

## 2023-11-07 DIAGNOSIS — L03.116 CELLULITIS OF LEFT LOWER EXTREMITY: Primary | ICD-10-CM

## 2023-11-07 PROCEDURE — 99204 PR OFFICE/OUTPT VISIT, NEW, LEVL IV, 45-59 MIN: ICD-10-PCS | Mod: S$GLB,,, | Performed by: NURSE PRACTITIONER

## 2023-11-07 PROCEDURE — 99204 OFFICE O/P NEW MOD 45 MIN: CPT | Mod: S$GLB,,, | Performed by: NURSE PRACTITIONER

## 2023-11-07 RX ORDER — SULFAMETHOXAZOLE AND TRIMETHOPRIM 800; 160 MG/1; MG/1
1 TABLET ORAL 2 TIMES DAILY
Qty: 14 TABLET | Refills: 0 | Status: SHIPPED | OUTPATIENT
Start: 2023-11-07 | End: 2023-11-14

## 2023-11-07 RX ORDER — GABAPENTIN 100 MG/1
100 CAPSULE ORAL
COMMUNITY
Start: 2023-10-30

## 2023-11-07 NOTE — TELEPHONE ENCOUNTER
Pt requesting to see Aura this Thursday. Informed pt Aura is not in office that day. Pt will report to walk in clinic. Verbalized understanding.

## 2023-11-07 NOTE — TELEPHONE ENCOUNTER
----- Message from Sara Garzon Patient Care Assistant sent at 11/7/2023  9:27 AM CST -----  Regarding: appointment  Contact: pt  Type:  Sooner Appointment Request    Caller is requesting a sooner appointment.  Caller declined first available appointment listed below.  Caller will not accept being placed on the waitlist and is requesting a message be sent to doctor.    Name of Caller:  pt     When is the first available appointment?  11/7/2023    Symptoms:  wound on toe and leg    Would the patient rather a call back or a response via [employer]? Callback     Best Call Back Number:  [phone] (home)     Additional Information:  please call pt to advise. Thanks!

## 2023-11-07 NOTE — PROGRESS NOTES
"CHIEF COMPLAINT  Chief Complaint   Patient presents with    Recurrent Skin Infections       HPI  Marifer Rodriguez a 73 y.o. female who presents with complaints of redness, swelling and pain to left lower extremity x 3 days.  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  Patient reports she will have frequent flare-ups the need to be treated with antibiotics.  Patient denies recent injury or trauma, numbness or tingling, decreased sensation or decreased range of motion.      CURRENT MEDICATIONS  Current Outpatient Medications on File Prior to Visit   Medication Sig Dispense Refill    aspirin (ECOTRIN) 81 MG EC tablet Take 81 mg by mouth once daily.      atenoloL (TENORMIN) 100 MG tablet Take 1 tablet (100 mg total) by mouth once daily. 90 tablet 1    benazepril-hydrochlorthiazide (LOTENSIN HCT) 20-12.5 mg per tablet Take 1 tablet by mouth once daily. 90 tablet 3    blood sugar diagnostic (TRUE METRIX GLUCOSE TEST STRIP) Strp TEST FOUR TIMES DAILY 400 strip 3    dapagliflozin-metformin (XIGDUO XR) 5-1,000 mg Take 1 tablet by mouth every morning. 90 tablet 3    diclofenac (VOLTAREN) 75 MG EC tablet Take 1 tablet (75 mg total) by mouth 2 (two) times daily. 180 tablet 1    DROPLET PEN NEEDLE 32 gauge x 5/32" Ndle USE AS DIRECTED TWICE DAILY 200 each 5    fenofibrate (TRICOR) 145 MG tablet Take 1 tablet (145 mg total) by mouth once daily. 90 tablet 3    gabapentin (NEURONTIN) 400 MG capsule Take 1 capsule (400 mg total) by mouth every evening. 90 capsule 3    gabapentin (NEURONTIN) 400 MG capsule Take 1 capsule (400 mg total) by mouth 3 (three) times daily. 270 capsule 3    insulin aspart protamine-insulin aspart (NOVOLOG MIX 70-30FLEXPEN U-100) 100 unit/mL (70-30) InPn pen INJECT 20 UNITS SUBCUTANEOUSLY IN THE MORNING AND 25 UNITS IN THE EVENING. (Patient taking differently: INJECT 30 UNITS SUBCUTANEOUSLY IN THE MORNING AND 35 UNITS IN THE EVENING.) 120 mL 5    simvastatin " (ZOCOR) 20 MG tablet Take 1 tablet (20 mg total) by mouth every evening. HS Monday, Wednesday, Friday (Patient taking differently: Take 20 mg by mouth every other day.) 90 tablet 3    TRUE METRIX GLUCOSE METER kit USE AS DIRECTED 1 each 0    gabapentin (NEURONTIN) 100 MG capsule Take 100 mg by mouth.       No current facility-administered medications on file prior to visit.       ALLERGIES  Review of patient's allergies indicates:   Allergen Reactions    Banana Hives    Penicillins Hives       Immunization History   Administered Date(s) Administered    COVID-19, vector-nr, rS-Ad26, PF ([place]leen) 03/23/2021    Influenza 01/05/2011    Influenza (FLUAD) - Quadrivalent - Adjuvanted - PF *Preferred* (65+) 10/19/2021    Influenza - Quadrivalent - MDCK 09/26/2017, 09/18/2018    Influenza - Quadrivalent - PF *Preferred* (6 months and older) 09/26/2017, 09/18/2018, 10/04/2019, 10/07/2020    Influenza - Trivalent (ADULT) 11/21/2012, 11/04/2014, 10/14/2015, 10/07/2016    Influenza - Trivalent - PF (ADULT) 10/18/2013    Pneumococcal Polysaccharide - 23 Valent 11/26/2012    Td (ADULT) 03/20/2018    Zoster 10/25/2013       PAST MEDICAL HISTORY  Past Medical History:   Diagnosis Date    Arthritis     Diabetes mellitus, type 2     Encounter for blood transfusion     Hypertension     PONV (postoperative nausea and vomiting)     Wears glasses        SURGICAL HISTORY  Past Surgical History:   Procedure Laterality Date    FOOT MASS EXCISION Left 2/7/2020    Procedure: EXCISION, MASS, FOOT;  Surgeon: Osvaldo Arreola DPM;  Location: [facility] OR;  Service: Podiatry;  Laterality: Left;    HIP ARTHROPLASTY Right 1/22/2019    Procedure: ARTHROPLASTY, HIP;  Surgeon: Jose Nicole Jr., MD;  Location: [facility] OR;  Service: Orthopedics;  Laterality: Right;  Synvisc    HIP SURGERY      HYSTERECTOMY      JOINT REPLACEMENT Left     HIP    PARTIAL HYSTERECTOMY      partical toe amputation      REPAIR OF TENDON OF LOWER EXTREMITY Left 2/7/2020     Procedure: REPAIR, TENDON, LOWER EXTREMITY;  Surgeon: Osvaldo Arreola DPM;  Location: [facility] OR;  Service: Podiatry;  Laterality: Left;  tendon repair lt ft big toe       SOCIAL HISTORY  Social History     Socioeconomic History    Marital status:    Tobacco Use    Smoking status: Former     Current packs/day: 0.00     Average packs/day: 0.8 packs/day for 15.0 years (11.3 ttl pk-yrs)     Types: Cigarettes     Start date:      Quit date: 1979     Years since quittin.8    Smokeless tobacco: Never   Substance and Sexual Activity    Alcohol use: No    Drug use: No       FAMILY HISTORY  Family History   Family history unknown: Yes       REVIEW OF SYSTEMS  Constitutional: No fever, chills, or weakness.  Respiratory: No cough, wheezing or shortness of breath  Cardiovascular: No chest pain, palpitations or edema  Musculoskeletal:  pain, swelling and redness to left lower leg, full range of motion. Good sensation  Skin: No rash or abrasion  Neurologic: No focal weakness or sensory changes.  All systems otherwise negative except as noted in the Review of Systems and History of Present Illness      PHYSICAL EXAM  Reviewed Triage Note  VITAL SIGNS: 137/80  Constitutional: Well developed, well nourished, Alert and oriented x3, No acute distress, non-toxic appearance.  Respiratory: Normal breath sounds, no respiratory distress, no wheezing, no rhonchi, no rales  Cardiovascular: HR 66, normal rhythm, no murmurs, no rubs, no gallops.  Gi: Bowel sounds normal, soft, no tenderness, non-distended, no masses, no pulsatile masses.  Musculoskeletal: + edema and tenderness to left lower leg, edema to left foot, no cyanosis, no clubbing. Good range of motion in all major joints. No major deformities noted.   Integument: Warm, Dry, No rash, + erythema to left lower leg  Neurologic: Normal motor function, normal sensory function. No focal deficits noted. Intact distal pulses  Psychiatric: Affect normal, judgment normal,  mood normal          ED COURSE & MEDICAL DECISION MAKING  Physical exam findings discussed with patient. No acute emergent medical condition identified at this time to warrant further testing. Will dispo home with instructions to follow up with PCP tomorrow. Script for bactrim ds sent to pharmacy of choice with instructions on usage. Pt agrees with plan of care.     DISPOSITION  Patient discharged in stable condition       CLINICAL IMPRESSION:  The encounter diagnosis was Cellulitis of left lower extremity.    Patient advised to follow-up with your PCP within 3 days for BP re-check if Blood Pressure was >120/80 without history of hypertension.

## 2023-11-07 NOTE — PROGRESS NOTES
"Subjective:      Patient ID: Marifer Bernardo is a 73 y.o. female.    Vitals:  height is 5' 4" (1.626 m) and weight is 78 kg (172 lb). Her temperature is 98 °F (36.7 °C). Her blood pressure is 137/80 and her pulse is 66. Her respiration is 18 and oxygen saturation is 95%.     Chief Complaint: Recurrent Skin Infections    Swelling and redness to the left leg. Recurrent infection to the left great toe.  Pt also needs her handicap paper resigned.      ROS   Objective:     Physical Exam    Assessment:     No diagnosis found.    Plan:       There are no diagnoses linked to this encounter.                "

## 2023-11-08 ENCOUNTER — TELEPHONE (OUTPATIENT)
Dept: FAMILY MEDICINE | Facility: CLINIC | Age: 73
End: 2023-11-08
Payer: MEDICARE

## 2023-11-08 NOTE — TELEPHONE ENCOUNTER
----- Message from Desiree Reid sent at 11/8/2023  9:08 AM CST -----  Regarding: HANCAPIED TAG  Contact: MARTIN MUÑOZ [phone]    Type: Needs Medical Advice      Who Called:  MARTIN MUÑOZ     Best Call Back Number:  [phone] (home)       Additional Information: Patient is calling to speak with nurse/MA regarding handclap tag   Please call back and advise. Thanks

## 2023-11-13 NOTE — TELEPHONE ENCOUNTER
Called and spoke with patient regarding DPA. She states she already got another provider to complete this for her in the meantime. Form no longer needed from our office.

## 2023-11-28 DIAGNOSIS — I10 ESSENTIAL HYPERTENSION: ICD-10-CM

## 2023-11-28 RX ORDER — ATENOLOL 100 MG/1
100 TABLET ORAL DAILY
Qty: 90 TABLET | Refills: 3 | Status: SHIPPED | OUTPATIENT
Start: 2023-11-28

## 2023-11-28 NOTE — TELEPHONE ENCOUNTER
----- Message from Devorah Saldana sent at 11/28/2023  9:35 AM CST -----  Regarding: refill  Contact: patient  Type:  RX Refill Request    Who Called:  patient  Refill or New Rx:  refill  RX Name and Strength:  atenoloL (TENORMIN) 100 MG tablet  How is the patient currently taking it? (ex. 1XDay):    Is this a 30 day or 90 day RX:  90  Preferred Pharmacy with phone number:        [facility] - [place], [place] - [address]  [address] [address]  Phone: [phone] Fax: [phone]      Local or Mail Order:    Ordering Provider:    Best Call Back Number:  [phone]    Additional Information:  call once the refill has been sent thanks!

## 2023-11-29 NOTE — TELEPHONE ENCOUNTER
Left detailed message regarding her refill for her tenormin. Letting her know the rx was submitted to her requested pharmacy.

## 2023-12-04 DIAGNOSIS — M51.36 DDD (DEGENERATIVE DISC DISEASE), LUMBAR: Primary | ICD-10-CM

## 2023-12-04 RX ORDER — METHOCARBAMOL 500 MG/1
500 TABLET, FILM COATED ORAL 4 TIMES DAILY
Qty: 40 TABLET | Refills: 0 | Status: SHIPPED | OUTPATIENT
Start: 2023-12-04 | End: 2023-12-14

## 2023-12-04 NOTE — TELEPHONE ENCOUNTER
Spoke with the patient made aware her prescription request was submitted to her pharmacy per provider.

## 2023-12-04 NOTE — TELEPHONE ENCOUNTER
----- Message from Mamta Hernandez sent at 12/4/2023  9:17 AM CST -----  Type:  RX Refill Request    Who Called:  patient  Refill or New Rx:  refill  RX Name and Strength:  methocarbamoL (ROBAXIN) 500 MG Tab  How is the patient currently taking it? (ex. 1XDay):  as directed  Is this a 30 day or 90 day RX:  90  Preferred Pharmacy with phone number:    [id number] - [place], [place] - [address] [facility] [address]  [address]  [place] [address]  Phone: [phone] Fax: [phone]  Local or Mail Order:  local  Ordering Provider:  kedar Sutton Call Back Number:  [phone] (home)   Additional Information:  patient is out, please refill today

## 2024-03-18 DIAGNOSIS — E11.49 TYPE II DIABETES MELLITUS WITH NEUROLOGICAL MANIFESTATIONS: ICD-10-CM

## 2024-03-18 DIAGNOSIS — M17.11 PRIMARY OSTEOARTHRITIS OF RIGHT KNEE: ICD-10-CM

## 2024-03-18 NOTE — TELEPHONE ENCOUNTER
Refill Routing Note   Medication(s) are not appropriate for processing by [facility] for the following reason(s):        Non-participating provider    [facility] action(s):  Route               Appointments  past 12m or future 3m with PCP    Date Provider   Last Visit   10/16/2023 Auar Pereyra NP   Next Visit   Visit date not found Aura Pereyra NP   ED visits in past 90 days: 0        Note composed:4:47 PM 03/18/2024

## 2024-03-19 RX ORDER — CALCIUM CITRATE/VITAMIN D3 200MG-6.25
TABLET ORAL
Qty: 400 STRIP | Refills: 3 | Status: SHIPPED | OUTPATIENT
Start: 2024-03-19

## 2024-03-19 RX ORDER — DICLOFENAC SODIUM 75 MG/1
75 TABLET, DELAYED RELEASE ORAL 2 TIMES DAILY
Qty: 180 TABLET | Refills: 3 | Status: SHIPPED | OUTPATIENT
Start: 2024-03-19

## 2024-04-12 ENCOUNTER — OFFICE VISIT (OUTPATIENT)
Dept: URGENT CARE | Facility: CLINIC | Age: 74
End: 2024-04-12
Payer: MEDICARE

## 2024-04-12 VITALS
OXYGEN SATURATION: 97 % | HEART RATE: 70 BPM | RESPIRATION RATE: 18 BRPM | SYSTOLIC BLOOD PRESSURE: 148 MMHG | BODY MASS INDEX: 29.52 KG/M2 | WEIGHT: 172 LBS | TEMPERATURE: 98 F | DIASTOLIC BLOOD PRESSURE: 77 MMHG

## 2024-04-12 DIAGNOSIS — R60.9 SWELLING: ICD-10-CM

## 2024-04-12 DIAGNOSIS — L03.116 CELLULITIS OF LEFT LOWER EXTREMITY: Primary | ICD-10-CM

## 2024-04-12 DIAGNOSIS — R22.42 LOCALIZED SWELLING, MASS AND LUMP, LEFT LOWER LIMB: ICD-10-CM

## 2024-04-12 PROCEDURE — 99214 OFFICE O/P EST MOD 30 MIN: CPT | Mod: S$GLB,,, | Performed by: STUDENT IN AN ORGANIZED HEALTH CARE EDUCATION/TRAINING PROGRAM

## 2024-04-12 RX ORDER — CLINDAMYCIN HYDROCHLORIDE 300 MG/1
300 CAPSULE ORAL 3 TIMES DAILY
Qty: 30 CAPSULE | Refills: 0 | Status: SHIPPED | OUTPATIENT
Start: 2024-04-12 | End: 2024-04-22

## 2024-04-12 NOTE — PROGRESS NOTES
Subjective:      Patient ID: Marifer Bernardo is a 74 y.o. female.    Vitals:  weight is 78 kg (172 lb). Her temperature is 98.2 °F (36.8 °C). Her blood pressure is 148/77 (abnormal) and her pulse is 70. Her respiration is 18 and oxygen saturation is 97%.     Chief Complaint: Cellulitis (Pt states she has been having cellulitis in her legs off and on x 5 years.  /Her left leg has been red and swollen x 2-3 weeks and she thinks she has cellulitis again.)    Ambulatory to room with complaint of left lower extreme pain.  States gets cellulitis every 6 months or so.  Admits to having a procedure toe approximately 5 years ago and has had issues with healing since.        Constitution: Negative.   HENT: Negative.     Neck: neck negative.   Cardiovascular: Negative.    Eyes: Negative.    Respiratory: Negative.     Gastrointestinal: Negative.    Genitourinary: Negative.    Musculoskeletal: Negative.    Skin:  Positive for rash and erythema.   Allergic/Immunologic: Negative.    Neurological: Negative.    Psychiatric/Behavioral: Negative.        Objective:     Physical Exam   Constitutional: She is oriented to person, place, and time. She appears well-developed. She is cooperative.  Non-toxic appearance. She does not appear ill. No distress.   HENT:   Head: Normocephalic and atraumatic.   Ears:   Right Ear: Hearing, tympanic membrane, external ear and ear canal normal.   Left Ear: Hearing, tympanic membrane, external ear and ear canal normal.   Nose: Nose normal. No mucosal edema, rhinorrhea or nasal deformity. No epistaxis. Right sinus exhibits no maxillary sinus tenderness and no frontal sinus tenderness. Left sinus exhibits no maxillary sinus tenderness and no frontal sinus tenderness.   Mouth/Throat: Uvula is midline, oropharynx is clear and moist and mucous membranes are normal. No trismus in the jaw. Normal dentition. No uvula swelling. No oropharyngeal exudate, posterior oropharyngeal edema or posterior oropharyngeal  erythema.   Eyes: Conjunctivae and lids are normal. No scleral icterus.   Neck: Trachea normal and phonation normal. Neck supple. No edema present. No erythema present. No neck rigidity present.   Cardiovascular: Normal rate, regular rhythm, normal heart sounds and normal pulses.   Pulmonary/Chest: Effort normal and breath sounds normal. No respiratory distress. She has no decreased breath sounds. She has no rhonchi.   Abdominal: Normal appearance.   Musculoskeletal: Normal range of motion.         General: No deformity. Normal range of motion.   Neurological: She is alert and oriented to person, place, and time. She exhibits normal muscle tone. Coordination normal.   Skin: Skin is warm, dry, intact, not diaphoretic and not pale. erythema         Comments: Left lower extremity with erythema edema.  Area is cool to touch   Psychiatric: Her speech is normal and behavior is normal. Judgment and thought content normal.   Nursing note and vitals reviewed.      Assessment:     1. Cellulitis of left lower extremity    2. Swelling    3. Localized swelling, mass and lump, left lower limb        Plan:       Cellulitis of left lower extremity  -     US Lower Extremity Veins Left; Future; Expected date: 04/12/2024    Swelling    Localized swelling, mass and lump, left lower limb  -     US Lower Extremity Veins Left; Future; Expected date: 04/12/2024    Other orders  -     clindamycin (CLEOCIN) 300 MG capsule; Take 1 capsule (300 mg total) by mouth 3 (three) times daily. for 10 days  Dispense: 30 capsule; Refill: 0           Considered Rocephin injection prior to discharge however clinic is out of this medication at this time, discussed need for ultrasound as well as need for starting antibiotic and when to present for further evaluation or management.

## 2024-04-23 ENCOUNTER — TELEPHONE (OUTPATIENT)
Dept: PODIATRY | Facility: CLINIC | Age: 74
End: 2024-04-23
Payer: MEDICARE

## 2024-04-23 NOTE — TELEPHONE ENCOUNTER
Contacted patient after receiving referral [facility] for Dr Osvaldo abreu of left foot.  Patient stated that she has a follow up appointment on Thursday 04/25/2024.  Patient will call back to schedule appointment after follow up visit 04/23/2024 VAISHNAVI RENEE.

## 2024-06-12 DIAGNOSIS — E78.2 MIXED HYPERLIPIDEMIA: ICD-10-CM

## 2024-06-13 RX ORDER — SIMVASTATIN 20 MG/1
TABLET, FILM COATED ORAL
Qty: 39 TABLET | Refills: 3 | Status: SHIPPED | OUTPATIENT
Start: 2024-06-13

## 2024-10-31 DIAGNOSIS — E78.2 MIXED HYPERLIPIDEMIA: ICD-10-CM

## 2024-10-31 RX ORDER — FENOFIBRATE 145 MG/1
145 TABLET, FILM COATED ORAL
Qty: 90 TABLET | Refills: 0 | Status: SHIPPED | OUTPATIENT
Start: 2024-10-31

## 2024-11-08 ENCOUNTER — PATIENT OUTREACH (OUTPATIENT)
Dept: ADMINISTRATIVE | Facility: HOSPITAL | Age: 74
End: 2024-11-08
Payer: MEDICARE

## 2024-11-08 NOTE — PROGRESS NOTES
Population Health Chart Review & Patient Outreach Details      Additional [facility] Notes:               Updates Requested / Reviewed:      Updated Care Coordination Note         Health Maintenance Topics Overdue:      VBHM Score: 6     Urine Screening  Eye Exam  Hemoglobin A1c  Lipid Panel  Foot Exam  Uncontrolled BP    Influenza Vaccine  Pneumonia Vaccine  Shingles/Zoster Vaccine  RSV Vaccine                  Health Maintenance Topic(s) Outreach Outcomes & Actions Taken:    Lab(s) - Outreach Outcomes & Actions Taken  : Left VM for pt to call back regarding pt's overdue Hemoglobin A1C

## 2024-11-27 DIAGNOSIS — I15.2 HYPERTENSION ASSOCIATED WITH DIABETES: ICD-10-CM

## 2024-11-27 DIAGNOSIS — E11.59 HYPERTENSION ASSOCIATED WITH DIABETES: ICD-10-CM

## 2024-11-27 RX ORDER — BENAZEPRIL HYDROCHLORIDE AND HYDROCHLOROTHIAZIDE 20; 12.5 MG/1; MG/1
1 TABLET ORAL
Qty: 30 TABLET | Refills: 0 | Status: SHIPPED | OUTPATIENT
Start: 2024-11-27

## 2024-11-27 NOTE — TELEPHONE ENCOUNTER
Refill Routing Note   Medication(s) are not appropriate for processing by [facility] for the following reason(s):        Non-participating provider    [facility] action(s):  Route               Appointments  past 12m or future 3m with PCP    Date Provider   Last Visit   10/16/2023 Aura Pereyra NP   Next Visit   Visit date not found Aura Pereyra NP   ED visits in past 90 days: 0        Note composed:1:59 PM 11/27/2024

## 2024-11-27 NOTE — TELEPHONE ENCOUNTER
Left message with member of household to have patient call back to schedule a follow up appointment.

## 2024-11-29 DIAGNOSIS — I10 ESSENTIAL HYPERTENSION: ICD-10-CM

## 2024-11-29 RX ORDER — ATENOLOL 100 MG/1
100 TABLET ORAL
Qty: 90 TABLET | Refills: 3 | OUTPATIENT
Start: 2024-11-29

## 2024-11-29 NOTE — TELEPHONE ENCOUNTER
Refill Routing Note   Medication(s) are not appropriate for processing by [facility] for the following reason(s):        Non-participating provider    [facility] action(s):  Route             Appointments  past 12m or future 3m with PCP    Date Provider   Last Visit   10/16/2023 Aura Pereyra NP   Next Visit   Visit date not found Aura Pereyra NP   ED visits in past 90 days: 0        Note composed:9:57 AM 11/29/2024

## (undated) DEVICE — TAPE SILK 3IN

## (undated) DEVICE — CLOSURE SKIN STERI STRIP 1/2X4

## (undated) DEVICE — PAD ABD 8X10 STERILE

## (undated) DEVICE — GLOVE BIOGEL PI ORTHO PRO 7.5

## (undated) DEVICE — SEE MEDLINE ITEM 154981

## (undated) DEVICE — SYR B-D DISP CONTROL 10CC100/C

## (undated) DEVICE — SEE MEDLINE ITEM 146420

## (undated) DEVICE — SYRINGE SAFETY LOK 10CC 305559

## (undated) DEVICE — SUT ETHILON 2 BLK MONO TP-1

## (undated) DEVICE — SPONGE DERMACEA GAUZE 4X4

## (undated) DEVICE — SEE MEDLINE ITEM 157131

## (undated) DEVICE — DRESSING GAUZE 6PLY 4X4

## (undated) DEVICE — GLOVE SURG ULTRA TOUCH 7

## (undated) DEVICE — GAUZE SPONGE 4X4 12PLY

## (undated) DEVICE — SUT VICRYL 1 MO-4 18 CR/8

## (undated) DEVICE — PACK CUSTOM UNIV BASIN SLI

## (undated) DEVICE — TUBE SUCTION YANKAUER HI CAP

## (undated) DEVICE — SEE MEDLINE ITEM 152522

## (undated) DEVICE — SEE MEDLINE ITEM 146417

## (undated) DEVICE — SUT 2-0 ETHILON 18 FS

## (undated) DEVICE — GOWN X-LG STERILE BACK

## (undated) DEVICE — SEE MEDLINE ITEM 146298

## (undated) DEVICE — BLADE SURG #15 CARBON STEEL

## (undated) DEVICE — EVACUATOR WOUND BULB 100CC

## (undated) DEVICE — SUT VCRL 2-0 GYN 8-18IN MO4

## (undated) DEVICE — DRESSING N ADH OIL EMUL 3X8

## (undated) DEVICE — SUT #2 TI-CRON HGS-21 30IN

## (undated) DEVICE — BRUSH SCRUB HIBICLENS 4%

## (undated) DEVICE — DRAPE STRL FLASHPAD DETECTOR

## (undated) DEVICE — SEE MEDLINE ITEM 157216

## (undated) DEVICE — DRAPE MINI C-ARM

## (undated) DEVICE — SCRUB HIBICLENS 4% CHG 4OZ

## (undated) DEVICE — ELECTRODE REM PLYHSV RETURN 9

## (undated) DEVICE — SEE MEDLINE ITEM 152622

## (undated) DEVICE — DRAPE INCISE IOBAN 2 23X17IN

## (undated) DEVICE — PACK BASIC

## (undated) DEVICE — DRESSING N ADH OIL EMUL 3X3

## (undated) DEVICE — Device

## (undated) DEVICE — KIT MICRO CORKSCREW
Type: IMPLANTABLE DEVICE | Site: FOOT | Status: NON-FUNCTIONAL
Removed: 2020-02-07

## (undated) DEVICE — CONTAINER SPECIMEN STRL 4OZ

## (undated) DEVICE — GLOVE PROTEXIS PI SYN SURG 8.0

## (undated) DEVICE — DRAPE STERI U-SHAPED 47X51IN

## (undated) DEVICE — NDL HYPO REG 25G X 1 1/2

## (undated) DEVICE — PAD PREPS ALCOHOL 2-PLY LARGE

## (undated) DEVICE — SEE MEDLINE ITEM 157166

## (undated) DEVICE — INTERPULSE SET

## (undated) DEVICE — SUT MONOCRYL 4-0 SH UND MON

## (undated) DEVICE — SEE MEDLINE ITEM 146313

## (undated) DEVICE — GLOVE 8 PROTEXIS PI BLUE

## (undated) DEVICE — SEE MEDLINE ITEM 107746

## (undated) DEVICE — SUT 3-0 VICRYL / SH (J416)

## (undated) DEVICE — BLADE ELECTRO EXTENDED.

## (undated) DEVICE — SEE MEDLINE ITEM 146270

## (undated) DEVICE — TRAY DRY SPONGE SCRUB W FOAM

## (undated) DEVICE — NDL 18GA

## (undated) DEVICE — GLOVE PROTEXIS PI SYN SURG 7.5

## (undated) DEVICE — SOL 9P NACL IRR PIC IL

## (undated) DEVICE — LINER SUCTION 3000CC

## (undated) DEVICE — GLOVE SURGEONS ULTRA TOUCH 6.5

## (undated) DEVICE — TOURNIQUET SB QC SP 18X4IN

## (undated) DEVICE — SEE MEDLINE ITEM 157148

## (undated) DEVICE — SUT MONOCRYL 3-0 PS-1

## (undated) DEVICE — STAPLER SKIN ROTATING HEAD

## (undated) DEVICE — GOWN B1 X-LG X-LONG

## (undated) DEVICE — PAD OR EGGCRATE BLUE 2X20X72

## (undated) DEVICE — CANISTER SUCTION 3000CC

## (undated) DEVICE — BLADE SAGITTAL 25MMX75MMX89MM

## (undated) DEVICE — SOL IRR NACL .9% 3000ML

## (undated) DEVICE — ALCOHOL 70% ISOP RUBBING 4OZ

## (undated) DEVICE — SPONGE SUPER KERLIX 6X6.75IN

## (undated) DEVICE — SEE MEDLINE ITEM 152530